# Patient Record
Sex: FEMALE | Race: WHITE | Employment: OTHER | RURAL
[De-identification: names, ages, dates, MRNs, and addresses within clinical notes are randomized per-mention and may not be internally consistent; named-entity substitution may affect disease eponyms.]

---

## 2017-01-05 ENCOUNTER — NURSE ONLY (OUTPATIENT)
Dept: PRIMARY CARE CLINIC | Age: 72
End: 2017-01-05
Payer: MEDICARE

## 2017-01-05 DIAGNOSIS — E53.8 VITAMIN B12 DEFICIENCY: Primary | ICD-10-CM

## 2017-01-05 PROCEDURE — 96372 THER/PROPH/DIAG INJ SC/IM: CPT | Performed by: NURSE PRACTITIONER

## 2017-01-05 RX ORDER — CYANOCOBALAMIN 1000 UG/ML
1000 INJECTION INTRAMUSCULAR; SUBCUTANEOUS ONCE
Status: COMPLETED | OUTPATIENT
Start: 2017-01-05 | End: 2017-01-05

## 2017-01-05 RX ADMIN — CYANOCOBALAMIN 1000 MCG: 1000 INJECTION INTRAMUSCULAR; SUBCUTANEOUS at 10:52

## 2017-01-12 ENCOUNTER — NURSE ONLY (OUTPATIENT)
Dept: PRIMARY CARE CLINIC | Age: 72
End: 2017-01-12
Payer: MEDICARE

## 2017-01-12 DIAGNOSIS — E53.8 VITAMIN B12 DEFICIENCY: Primary | ICD-10-CM

## 2017-01-12 PROCEDURE — 96372 THER/PROPH/DIAG INJ SC/IM: CPT | Performed by: NURSE PRACTITIONER

## 2017-01-12 RX ORDER — CYANOCOBALAMIN 1000 UG/ML
1000 INJECTION INTRAMUSCULAR; SUBCUTANEOUS ONCE
Status: COMPLETED | OUTPATIENT
Start: 2017-01-12 | End: 2017-01-12

## 2017-01-12 RX ADMIN — CYANOCOBALAMIN 1000 MCG: 1000 INJECTION INTRAMUSCULAR; SUBCUTANEOUS at 12:17

## 2017-01-18 ENCOUNTER — OFFICE VISIT (OUTPATIENT)
Dept: PRIMARY CARE CLINIC | Age: 72
End: 2017-01-18
Payer: MEDICARE

## 2017-01-18 VITALS
TEMPERATURE: 98.6 F | DIASTOLIC BLOOD PRESSURE: 74 MMHG | HEIGHT: 62 IN | OXYGEN SATURATION: 97 % | RESPIRATION RATE: 20 BRPM | BODY MASS INDEX: 30.22 KG/M2 | HEART RATE: 74 BPM | WEIGHT: 164.2 LBS | SYSTOLIC BLOOD PRESSURE: 132 MMHG

## 2017-01-18 DIAGNOSIS — J01.00 ACUTE NON-RECURRENT MAXILLARY SINUSITIS: Primary | ICD-10-CM

## 2017-01-18 DIAGNOSIS — E53.8 VITAMIN B12 DEFICIENCY: ICD-10-CM

## 2017-01-18 PROCEDURE — 99213 OFFICE O/P EST LOW 20 MIN: CPT | Performed by: NURSE PRACTITIONER

## 2017-01-18 PROCEDURE — 1090F PRES/ABSN URINE INCON ASSESS: CPT | Performed by: NURSE PRACTITIONER

## 2017-01-18 PROCEDURE — G8484 FLU IMMUNIZE NO ADMIN: HCPCS | Performed by: NURSE PRACTITIONER

## 2017-01-18 PROCEDURE — 96372 THER/PROPH/DIAG INJ SC/IM: CPT | Performed by: NURSE PRACTITIONER

## 2017-01-18 PROCEDURE — G8399 PT W/DXA RESULTS DOCUMENT: HCPCS | Performed by: NURSE PRACTITIONER

## 2017-01-18 PROCEDURE — 4040F PNEUMOC VAC/ADMIN/RCVD: CPT | Performed by: NURSE PRACTITIONER

## 2017-01-18 PROCEDURE — G8427 DOCREV CUR MEDS BY ELIG CLIN: HCPCS | Performed by: NURSE PRACTITIONER

## 2017-01-18 PROCEDURE — G8419 CALC BMI OUT NRM PARAM NOF/U: HCPCS | Performed by: NURSE PRACTITIONER

## 2017-01-18 PROCEDURE — 1036F TOBACCO NON-USER: CPT | Performed by: NURSE PRACTITIONER

## 2017-01-18 PROCEDURE — 3014F SCREEN MAMMO DOC REV: CPT | Performed by: NURSE PRACTITIONER

## 2017-01-18 PROCEDURE — 1123F ACP DISCUSS/DSCN MKR DOCD: CPT | Performed by: NURSE PRACTITIONER

## 2017-01-18 PROCEDURE — 3017F COLORECTAL CA SCREEN DOC REV: CPT | Performed by: NURSE PRACTITIONER

## 2017-01-18 RX ORDER — SULFAMETHOXAZOLE AND TRIMETHOPRIM 800; 160 MG/1; MG/1
1 TABLET ORAL 2 TIMES DAILY
Qty: 20 TABLET | Refills: 0 | Status: SHIPPED | OUTPATIENT
Start: 2017-01-18 | End: 2017-01-28

## 2017-01-18 RX ORDER — CYANOCOBALAMIN 1000 UG/ML
1000 INJECTION INTRAMUSCULAR; SUBCUTANEOUS ONCE
Status: COMPLETED | OUTPATIENT
Start: 2017-01-18 | End: 2017-01-18

## 2017-01-18 RX ORDER — BENZONATATE 200 MG/1
200 CAPSULE ORAL 3 TIMES DAILY PRN
Qty: 30 CAPSULE | Refills: 0 | Status: SHIPPED | OUTPATIENT
Start: 2017-01-18 | End: 2017-01-25

## 2017-01-18 RX ADMIN — CYANOCOBALAMIN 1000 MCG: 1000 INJECTION INTRAMUSCULAR; SUBCUTANEOUS at 16:48

## 2017-01-18 ASSESSMENT — ENCOUNTER SYMPTOMS
GASTROINTESTINAL NEGATIVE: 1
COUGH: 1

## 2017-01-20 ENCOUNTER — NURSE ONLY (OUTPATIENT)
Dept: PRIMARY CARE CLINIC | Age: 72
End: 2017-01-20
Payer: MEDICARE

## 2017-01-20 DIAGNOSIS — Z12.11 SCREENING FOR COLON CANCER: Primary | ICD-10-CM

## 2017-01-20 LAB
CONTROL: POSITIVE
HEMOCCULT STL QL: POSITIVE

## 2017-01-20 PROCEDURE — 82274 ASSAY TEST FOR BLOOD FECAL: CPT | Performed by: NURSE PRACTITIONER

## 2017-01-25 ENCOUNTER — NURSE ONLY (OUTPATIENT)
Dept: PRIMARY CARE CLINIC | Age: 72
End: 2017-01-25
Payer: MEDICARE

## 2017-01-25 DIAGNOSIS — E53.8 B12 DEFICIENCY: Primary | ICD-10-CM

## 2017-01-25 PROCEDURE — 96372 THER/PROPH/DIAG INJ SC/IM: CPT | Performed by: NURSE PRACTITIONER

## 2017-01-25 RX ORDER — CYANOCOBALAMIN 1000 UG/ML
1000 INJECTION INTRAMUSCULAR; SUBCUTANEOUS ONCE
Status: COMPLETED | OUTPATIENT
Start: 2017-01-25 | End: 2017-01-25

## 2017-01-25 RX ADMIN — CYANOCOBALAMIN 1000 MCG: 1000 INJECTION INTRAMUSCULAR; SUBCUTANEOUS at 12:21

## 2017-02-01 DIAGNOSIS — R19.5 POSITIVE FIT (FECAL IMMUNOCHEMICAL TEST): Primary | ICD-10-CM

## 2017-02-02 ENCOUNTER — NURSE ONLY (OUTPATIENT)
Dept: PRIMARY CARE CLINIC | Age: 72
End: 2017-02-02
Payer: MEDICARE

## 2017-02-02 DIAGNOSIS — E53.8 VITAMIN B12 DEFICIENCY: Primary | ICD-10-CM

## 2017-02-02 PROCEDURE — 96372 THER/PROPH/DIAG INJ SC/IM: CPT | Performed by: NURSE PRACTITIONER

## 2017-02-02 RX ORDER — CYANOCOBALAMIN 1000 UG/ML
1000 INJECTION INTRAMUSCULAR; SUBCUTANEOUS ONCE
Status: COMPLETED | OUTPATIENT
Start: 2017-02-02 | End: 2017-02-02

## 2017-02-02 RX ADMIN — CYANOCOBALAMIN 1000 MCG: 1000 INJECTION INTRAMUSCULAR; SUBCUTANEOUS at 14:14

## 2017-02-07 ENCOUNTER — TELEPHONE (OUTPATIENT)
Dept: OTHER | Age: 72
End: 2017-02-07

## 2017-02-09 ENCOUNTER — OFFICE VISIT (OUTPATIENT)
Dept: PRIMARY CARE CLINIC | Age: 72
End: 2017-02-09
Payer: MEDICARE

## 2017-02-09 DIAGNOSIS — E53.8 B12 DEFICIENCY: Primary | ICD-10-CM

## 2017-02-09 PROCEDURE — 1036F TOBACCO NON-USER: CPT | Performed by: NURSE PRACTITIONER

## 2017-02-09 PROCEDURE — 96372 THER/PROPH/DIAG INJ SC/IM: CPT | Performed by: NURSE PRACTITIONER

## 2017-02-09 RX ORDER — CYANOCOBALAMIN 1000 UG/ML
1000 INJECTION INTRAMUSCULAR; SUBCUTANEOUS ONCE
Status: COMPLETED | OUTPATIENT
Start: 2017-02-09 | End: 2017-02-09

## 2017-02-09 RX ADMIN — CYANOCOBALAMIN 1000 MCG: 1000 INJECTION INTRAMUSCULAR; SUBCUTANEOUS at 11:05

## 2017-02-16 ENCOUNTER — NURSE ONLY (OUTPATIENT)
Dept: PRIMARY CARE CLINIC | Age: 72
End: 2017-02-16
Payer: MEDICARE

## 2017-02-16 DIAGNOSIS — E53.8 B12 DEFICIENCY: Primary | ICD-10-CM

## 2017-02-16 PROCEDURE — 96372 THER/PROPH/DIAG INJ SC/IM: CPT | Performed by: NURSE PRACTITIONER

## 2017-02-16 RX ORDER — CYANOCOBALAMIN 1000 UG/ML
1000 INJECTION INTRAMUSCULAR; SUBCUTANEOUS ONCE
Status: COMPLETED | OUTPATIENT
Start: 2017-02-16 | End: 2017-02-16

## 2017-02-16 RX ADMIN — CYANOCOBALAMIN 1000 MCG: 1000 INJECTION INTRAMUSCULAR; SUBCUTANEOUS at 14:24

## 2017-02-17 ENCOUNTER — HOSPITAL ENCOUNTER (OUTPATIENT)
Dept: OTHER | Age: 72
Discharge: HOME | End: 2017-02-17
Attending: SURGERY | Admitting: SURGERY

## 2017-02-17 VITALS
SYSTOLIC BLOOD PRESSURE: 152 MMHG | HEART RATE: 68 BPM | RESPIRATION RATE: 18 BRPM | TEMPERATURE: 99.4 F | OXYGEN SATURATION: 95 % | BODY MASS INDEX: 30.31 KG/M2 | DIASTOLIC BLOOD PRESSURE: 84 MMHG | WEIGHT: 164.7 LBS | HEIGHT: 62 IN

## 2017-02-17 RX ORDER — SODIUM CHLORIDE 0.9 % (FLUSH) 0.9 %
10 SYRINGE (ML) INJECTION PRN
Status: CANCELLED | OUTPATIENT
Start: 2017-02-17

## 2017-02-17 RX ORDER — SODIUM CHLORIDE, SODIUM LACTATE, POTASSIUM CHLORIDE, CALCIUM CHLORIDE 600; 310; 30; 20 MG/100ML; MG/100ML; MG/100ML; MG/100ML
INJECTION, SOLUTION INTRAVENOUS CONTINUOUS
Status: CANCELLED | OUTPATIENT
Start: 2017-02-17

## 2017-02-17 RX ORDER — SODIUM CHLORIDE 0.9 % (FLUSH) 0.9 %
10 SYRINGE (ML) INJECTION EVERY 12 HOURS SCHEDULED
Status: CANCELLED | OUTPATIENT
Start: 2017-02-17

## 2017-02-17 ASSESSMENT — ENCOUNTER SYMPTOMS
EYES NEGATIVE: 1
RESPIRATORY NEGATIVE: 1
GASTROINTESTINAL NEGATIVE: 1

## 2017-02-23 ENCOUNTER — NURSE ONLY (OUTPATIENT)
Dept: PRIMARY CARE CLINIC | Age: 72
End: 2017-02-23
Payer: MEDICARE

## 2017-02-23 DIAGNOSIS — E53.8 VITAMIN B12 DEFICIENCY: Primary | ICD-10-CM

## 2017-02-23 PROCEDURE — 96372 THER/PROPH/DIAG INJ SC/IM: CPT | Performed by: NURSE PRACTITIONER

## 2017-02-23 RX ORDER — CYANOCOBALAMIN 1000 UG/ML
1000 INJECTION INTRAMUSCULAR; SUBCUTANEOUS ONCE
Status: COMPLETED | OUTPATIENT
Start: 2017-02-23 | End: 2017-02-23

## 2017-02-23 RX ADMIN — CYANOCOBALAMIN 1000 MCG: 1000 INJECTION INTRAMUSCULAR; SUBCUTANEOUS at 11:51

## 2017-03-03 ENCOUNTER — HOSPITAL ENCOUNTER (OUTPATIENT)
Dept: OTHER | Age: 72
Discharge: HOME | End: 2017-03-03
Attending: SURGERY | Admitting: SURGERY

## 2017-03-03 ENCOUNTER — NURSE ONLY (OUTPATIENT)
Dept: PRIMARY CARE CLINIC | Age: 72
End: 2017-03-03
Payer: MEDICARE

## 2017-03-03 VITALS
TEMPERATURE: 99.1 F | BODY MASS INDEX: 30.36 KG/M2 | OXYGEN SATURATION: 98 % | HEART RATE: 63 BPM | WEIGHT: 165 LBS | HEIGHT: 62 IN

## 2017-03-03 DIAGNOSIS — E53.8 VITAMIN B12 DEFICIENCY: Primary | ICD-10-CM

## 2017-03-03 PROCEDURE — 96372 THER/PROPH/DIAG INJ SC/IM: CPT | Performed by: NURSE PRACTITIONER

## 2017-03-03 RX ORDER — CYANOCOBALAMIN 1000 UG/ML
1000 INJECTION INTRAMUSCULAR; SUBCUTANEOUS ONCE
Status: COMPLETED | OUTPATIENT
Start: 2017-03-03 | End: 2017-03-03

## 2017-03-03 RX ADMIN — CYANOCOBALAMIN 1000 MCG: 1000 INJECTION INTRAMUSCULAR; SUBCUTANEOUS at 15:24

## 2017-03-09 ENCOUNTER — NURSE ONLY (OUTPATIENT)
Dept: PRIMARY CARE CLINIC | Age: 72
End: 2017-03-09
Payer: MEDICARE

## 2017-03-09 DIAGNOSIS — E53.8 VITAMIN B12 DEFICIENCY: Primary | ICD-10-CM

## 2017-03-09 PROCEDURE — 96372 THER/PROPH/DIAG INJ SC/IM: CPT | Performed by: NURSE PRACTITIONER

## 2017-03-09 RX ORDER — CYANOCOBALAMIN 1000 UG/ML
1000 INJECTION INTRAMUSCULAR; SUBCUTANEOUS ONCE
Status: COMPLETED | OUTPATIENT
Start: 2017-03-09 | End: 2017-03-09

## 2017-03-09 RX ADMIN — CYANOCOBALAMIN 1000 MCG: 1000 INJECTION INTRAMUSCULAR; SUBCUTANEOUS at 14:38

## 2017-03-16 ENCOUNTER — NURSE ONLY (OUTPATIENT)
Dept: PRIMARY CARE CLINIC | Age: 72
End: 2017-03-16
Payer: MEDICARE

## 2017-03-16 DIAGNOSIS — E53.8 VITAMIN B12 DEFICIENCY: Primary | ICD-10-CM

## 2017-03-16 PROCEDURE — 96372 THER/PROPH/DIAG INJ SC/IM: CPT | Performed by: NURSE PRACTITIONER

## 2017-03-16 RX ORDER — CYANOCOBALAMIN 1000 UG/ML
1000 INJECTION INTRAMUSCULAR; SUBCUTANEOUS ONCE
Status: COMPLETED | OUTPATIENT
Start: 2017-03-16 | End: 2017-03-16

## 2017-03-16 RX ADMIN — CYANOCOBALAMIN 1000 MCG: 1000 INJECTION INTRAMUSCULAR; SUBCUTANEOUS at 13:49

## 2017-03-23 ENCOUNTER — NURSE ONLY (OUTPATIENT)
Dept: PRIMARY CARE CLINIC | Age: 72
End: 2017-03-23
Payer: MEDICARE

## 2017-03-23 DIAGNOSIS — E53.8 VITAMIN B12 DEFICIENCY: Primary | ICD-10-CM

## 2017-03-23 PROCEDURE — 96372 THER/PROPH/DIAG INJ SC/IM: CPT | Performed by: NURSE PRACTITIONER

## 2017-03-23 RX ORDER — CYANOCOBALAMIN 1000 UG/ML
1000 INJECTION INTRAMUSCULAR; SUBCUTANEOUS ONCE
Status: COMPLETED | OUTPATIENT
Start: 2017-03-23 | End: 2017-03-23

## 2017-03-23 RX ADMIN — CYANOCOBALAMIN 1000 MCG: 1000 INJECTION INTRAMUSCULAR; SUBCUTANEOUS at 11:59

## 2017-03-30 ENCOUNTER — TELEPHONE (OUTPATIENT)
Dept: PRIMARY CARE CLINIC | Age: 72
End: 2017-03-30

## 2017-03-30 ENCOUNTER — NURSE ONLY (OUTPATIENT)
Dept: PRIMARY CARE CLINIC | Age: 72
End: 2017-03-30
Payer: MEDICARE

## 2017-03-30 DIAGNOSIS — E53.8 B12 DEFICIENCY: Primary | ICD-10-CM

## 2017-03-30 PROCEDURE — 96372 THER/PROPH/DIAG INJ SC/IM: CPT | Performed by: NURSE PRACTITIONER

## 2017-03-30 RX ORDER — CYANOCOBALAMIN 1000 UG/ML
1000 INJECTION INTRAMUSCULAR; SUBCUTANEOUS ONCE
Status: COMPLETED | OUTPATIENT
Start: 2017-03-30 | End: 2017-03-30

## 2017-03-30 RX ADMIN — CYANOCOBALAMIN 1000 MCG: 1000 INJECTION INTRAMUSCULAR; SUBCUTANEOUS at 15:21

## 2017-04-17 ENCOUNTER — HOSPITAL ENCOUNTER (OUTPATIENT)
Dept: OTHER | Age: 72
Discharge: HOME | End: 2017-04-17
Attending: NURSE PRACTITIONER | Admitting: NURSE PRACTITIONER

## 2017-04-17 DIAGNOSIS — E78.00 PURE HYPERCHOLESTEROLEMIA: ICD-10-CM

## 2017-04-17 DIAGNOSIS — I10 ESSENTIAL HYPERTENSION: ICD-10-CM

## 2017-04-17 DIAGNOSIS — E11.9 TYPE 2 DIABETES MELLITUS WITHOUT COMPLICATION, WITHOUT LONG-TERM CURRENT USE OF INSULIN (HCC): ICD-10-CM

## 2017-04-17 LAB
A/G RATIO: 1.9 (ref 0.8–2)
ALBUMIN SERPL-MCNC: 4.4 G/DL (ref 3.4–4.8)
ALP BLD-CCNC: 121 U/L (ref 25–100)
ALT SERPL-CCNC: 17 U/L (ref 4–36)
ANION GAP SERPL CALCULATED.3IONS-SCNC: 13 MMOL/L (ref 3–16)
AST SERPL-CCNC: 16 U/L (ref 8–33)
BILIRUB SERPL-MCNC: 0.3 MG/DL (ref 0.3–1.2)
BUN BLDV-MCNC: 20 MG/DL (ref 6–20)
CALCIUM SERPL-MCNC: 9.3 MG/DL (ref 8.5–10.5)
CHLORIDE BLD-SCNC: 101 MMOL/L (ref 98–107)
CHOLESTEROL, TOTAL: 166 MG/DL (ref 0–200)
CO2: 30 MMOL/L (ref 20–30)
CREAT SERPL-MCNC: 0.9 MG/DL (ref 0.4–1.2)
CREATININE URINE: 35.1 MG/DL (ref 1.5–300)
GFR AFRICAN AMERICAN: >59
GFR NON-AFRICAN AMERICAN: >60
GLOBULIN: 2.3 G/DL
GLUCOSE BLD-MCNC: 81 MG/DL (ref 74–106)
HBA1C MFR BLD: 6.6 %
HCT VFR BLD CALC: 40 % (ref 37–47)
HDLC SERPL-MCNC: 53 MG/DL (ref 40–60)
HEMOGLOBIN: 13 G/DL (ref 11.5–16.5)
LDL CHOLESTEROL CALCULATED: 67 MG/DL
MCH RBC QN AUTO: 29.5 PG (ref 27–32)
MCHC RBC AUTO-ENTMCNC: 32.5 G/DL (ref 31–35)
MCV RBC AUTO: 90.9 FL (ref 80–100)
MICROALBUMIN UR-MCNC: <1.2 MG/DL (ref 0–22)
MICROALBUMIN/CREAT UR-RTO: NORMAL MG/G (ref 0–30)
PDW BLD-RTO: 13.6 % (ref 11–16)
PLATELET # BLD: 226 K/UL (ref 150–400)
PMV BLD AUTO: 11.5 FL (ref 6–10)
POTASSIUM SERPL-SCNC: 4.3 MMOL/L (ref 3.4–5.1)
RBC # BLD: 4.4 M/UL (ref 3.8–5.8)
SODIUM BLD-SCNC: 144 MMOL/L (ref 136–145)
TOTAL PROTEIN: 6.7 G/DL (ref 6.4–8.3)
TRIGL SERPL-MCNC: 230 MG/DL (ref 0–249)
VLDLC SERPL CALC-MCNC: 46 MG/DL
WBC # BLD: 7.9 K/UL (ref 4–11)

## 2017-04-20 ENCOUNTER — OFFICE VISIT (OUTPATIENT)
Dept: PRIMARY CARE CLINIC | Age: 72
End: 2017-04-20
Payer: MEDICARE

## 2017-04-20 VITALS
OXYGEN SATURATION: 96 % | BODY MASS INDEX: 30.36 KG/M2 | WEIGHT: 165 LBS | HEIGHT: 62 IN | SYSTOLIC BLOOD PRESSURE: 124 MMHG | HEART RATE: 64 BPM | DIASTOLIC BLOOD PRESSURE: 80 MMHG | RESPIRATION RATE: 20 BRPM

## 2017-04-20 DIAGNOSIS — E03.9 ACQUIRED HYPOTHYROIDISM: ICD-10-CM

## 2017-04-20 DIAGNOSIS — I10 ESSENTIAL HYPERTENSION: Primary | ICD-10-CM

## 2017-04-20 DIAGNOSIS — E78.00 PURE HYPERCHOLESTEROLEMIA: ICD-10-CM

## 2017-04-20 DIAGNOSIS — L98.9 LESION OF SKIN OF SCALP: ICD-10-CM

## 2017-04-20 DIAGNOSIS — E53.8 B12 DEFICIENCY: ICD-10-CM

## 2017-04-20 DIAGNOSIS — F32.A DEPRESSION, UNSPECIFIED DEPRESSION TYPE: ICD-10-CM

## 2017-04-20 DIAGNOSIS — M81.0 OSTEOPOROSIS: ICD-10-CM

## 2017-04-20 DIAGNOSIS — E11.9 TYPE 2 DIABETES MELLITUS WITHOUT COMPLICATION, WITHOUT LONG-TERM CURRENT USE OF INSULIN (HCC): ICD-10-CM

## 2017-04-20 DIAGNOSIS — E55.9 VITAMIN D DEFICIENCY: ICD-10-CM

## 2017-04-20 PROCEDURE — 1123F ACP DISCUSS/DSCN MKR DOCD: CPT | Performed by: NURSE PRACTITIONER

## 2017-04-20 PROCEDURE — G8399 PT W/DXA RESULTS DOCUMENT: HCPCS | Performed by: NURSE PRACTITIONER

## 2017-04-20 PROCEDURE — 3044F HG A1C LEVEL LT 7.0%: CPT | Performed by: NURSE PRACTITIONER

## 2017-04-20 PROCEDURE — 3017F COLORECTAL CA SCREEN DOC REV: CPT | Performed by: NURSE PRACTITIONER

## 2017-04-20 PROCEDURE — 1036F TOBACCO NON-USER: CPT | Performed by: NURSE PRACTITIONER

## 2017-04-20 PROCEDURE — G8427 DOCREV CUR MEDS BY ELIG CLIN: HCPCS | Performed by: NURSE PRACTITIONER

## 2017-04-20 PROCEDURE — 4040F PNEUMOC VAC/ADMIN/RCVD: CPT | Performed by: NURSE PRACTITIONER

## 2017-04-20 PROCEDURE — 99214 OFFICE O/P EST MOD 30 MIN: CPT | Performed by: NURSE PRACTITIONER

## 2017-04-20 PROCEDURE — 3014F SCREEN MAMMO DOC REV: CPT | Performed by: NURSE PRACTITIONER

## 2017-04-20 PROCEDURE — 1090F PRES/ABSN URINE INCON ASSESS: CPT | Performed by: NURSE PRACTITIONER

## 2017-04-20 PROCEDURE — 4005F PHARM THX FOR OP RXD: CPT | Performed by: NURSE PRACTITIONER

## 2017-04-20 PROCEDURE — G8417 CALC BMI ABV UP PARAM F/U: HCPCS | Performed by: NURSE PRACTITIONER

## 2017-04-20 RX ORDER — ATENOLOL 50 MG/1
TABLET ORAL
Qty: 90 TABLET | Refills: 3 | Status: SHIPPED | OUTPATIENT
Start: 2017-04-20 | End: 2017-04-20 | Stop reason: SDUPTHER

## 2017-04-20 RX ORDER — MONTELUKAST SODIUM 10 MG/1
TABLET ORAL
Qty: 90 TABLET | Refills: 3 | Status: SHIPPED | OUTPATIENT
Start: 2017-04-20 | End: 2017-10-19 | Stop reason: SDUPTHER

## 2017-04-20 RX ORDER — LISINOPRIL 5 MG/1
TABLET ORAL
Qty: 90 TABLET | Refills: 3 | Status: SHIPPED | OUTPATIENT
Start: 2017-04-20 | End: 2017-10-19 | Stop reason: SDUPTHER

## 2017-04-20 RX ORDER — FLUTICASONE PROPIONATE 50 MCG
SPRAY, SUSPENSION (ML) NASAL
Qty: 16 G | Refills: 3 | Status: SHIPPED | OUTPATIENT
Start: 2017-04-20 | End: 2017-10-19 | Stop reason: SDUPTHER

## 2017-04-20 RX ORDER — LEVOTHYROXINE SODIUM 0.03 MG/1
TABLET ORAL
Qty: 90 TABLET | Refills: 3 | Status: SHIPPED | OUTPATIENT
Start: 2017-04-20 | End: 2017-10-19 | Stop reason: SDUPTHER

## 2017-04-20 RX ORDER — ESOMEPRAZOLE MAGNESIUM 40 MG/1
CAPSULE, DELAYED RELEASE ORAL
Qty: 90 CAPSULE | Refills: 3 | Status: SHIPPED | OUTPATIENT
Start: 2017-04-20 | End: 2017-10-19 | Stop reason: SDUPTHER

## 2017-04-20 RX ORDER — ERGOCALCIFEROL (VITAMIN D2) 1250 MCG
50000 CAPSULE ORAL WEEKLY
Qty: 4 CAPSULE | Refills: 3 | Status: SHIPPED | OUTPATIENT
Start: 2017-04-20 | End: 2017-04-20 | Stop reason: SDUPTHER

## 2017-04-20 RX ORDER — ATENOLOL 50 MG/1
TABLET ORAL
Qty: 90 TABLET | Refills: 3 | Status: SHIPPED | OUTPATIENT
Start: 2017-04-20 | End: 2017-10-19 | Stop reason: SDUPTHER

## 2017-04-20 RX ORDER — ALENDRONATE SODIUM 70 MG/1
TABLET ORAL
Qty: 4 TABLET | Refills: 3 | Status: SHIPPED | OUTPATIENT
Start: 2017-04-20 | End: 2017-10-19 | Stop reason: SDUPTHER

## 2017-04-20 RX ORDER — ASPIRIN 81 MG/1
81 TABLET ORAL DAILY
Qty: 90 TABLET | Refills: 3 | Status: SHIPPED | OUTPATIENT
Start: 2017-04-20 | End: 2017-10-19 | Stop reason: SDUPTHER

## 2017-04-20 RX ORDER — ERGOCALCIFEROL (VITAMIN D2) 1250 MCG
50000 CAPSULE ORAL WEEKLY
Qty: 12 CAPSULE | Refills: 3 | Status: SHIPPED | OUTPATIENT
Start: 2017-04-20 | End: 2018-03-05 | Stop reason: SDUPTHER

## 2017-04-20 RX ORDER — LEVOTHYROXINE SODIUM 0.03 MG/1
TABLET ORAL
Qty: 90 TABLET | Refills: 3 | Status: SHIPPED | OUTPATIENT
Start: 2017-04-20 | End: 2017-04-20 | Stop reason: SDUPTHER

## 2017-04-20 RX ORDER — ESOMEPRAZOLE MAGNESIUM 40 MG/1
CAPSULE, DELAYED RELEASE ORAL
Qty: 90 CAPSULE | Refills: 3 | Status: SHIPPED | OUTPATIENT
Start: 2017-04-20 | End: 2017-04-20 | Stop reason: SDUPTHER

## 2017-04-20 RX ORDER — MONTELUKAST SODIUM 10 MG/1
TABLET ORAL
Qty: 90 TABLET | Refills: 3 | Status: SHIPPED | OUTPATIENT
Start: 2017-04-20 | End: 2017-04-20 | Stop reason: SDUPTHER

## 2017-04-20 RX ORDER — ALENDRONATE SODIUM 70 MG/1
TABLET ORAL
Qty: 4 TABLET | Refills: 3 | Status: SHIPPED | OUTPATIENT
Start: 2017-04-20 | End: 2017-04-20 | Stop reason: SDUPTHER

## 2017-04-20 RX ORDER — ATORVASTATIN CALCIUM 20 MG/1
20 TABLET, FILM COATED ORAL DAILY
Qty: 90 TABLET | Refills: 3 | Status: SHIPPED | OUTPATIENT
Start: 2017-04-20 | End: 2017-04-20 | Stop reason: SDUPTHER

## 2017-04-20 RX ORDER — ATORVASTATIN CALCIUM 20 MG/1
20 TABLET, FILM COATED ORAL DAILY
Qty: 90 TABLET | Refills: 3 | Status: SHIPPED | OUTPATIENT
Start: 2017-04-20 | End: 2017-10-19 | Stop reason: SDUPTHER

## 2017-04-20 RX ORDER — LISINOPRIL 5 MG/1
TABLET ORAL
Qty: 90 TABLET | Refills: 3 | Status: SHIPPED | OUTPATIENT
Start: 2017-04-20 | End: 2017-04-20 | Stop reason: SDUPTHER

## 2017-04-20 RX ORDER — ASPIRIN 81 MG/1
81 TABLET ORAL DAILY
Qty: 90 TABLET | Refills: 3 | Status: SHIPPED | OUTPATIENT
Start: 2017-04-20 | End: 2017-04-20 | Stop reason: SDUPTHER

## 2017-04-20 RX ORDER — ESCITALOPRAM OXALATE 10 MG/1
TABLET ORAL
Qty: 90 TABLET | Refills: 3 | Status: SHIPPED | OUTPATIENT
Start: 2017-04-20 | End: 2017-04-20 | Stop reason: SDUPTHER

## 2017-04-20 RX ORDER — ESCITALOPRAM OXALATE 10 MG/1
TABLET ORAL
Qty: 90 TABLET | Refills: 3 | Status: SHIPPED | OUTPATIENT
Start: 2017-04-20 | End: 2017-10-19 | Stop reason: SDUPTHER

## 2017-04-20 ASSESSMENT — ENCOUNTER SYMPTOMS
RESPIRATORY NEGATIVE: 1
GASTROINTESTINAL NEGATIVE: 1

## 2017-05-26 ENCOUNTER — NURSE ONLY (OUTPATIENT)
Dept: PRIMARY CARE CLINIC | Age: 72
End: 2017-05-26
Payer: MEDICARE

## 2017-05-26 DIAGNOSIS — J30.9 ALLERGIC RHINITIS, UNSPECIFIED ALLERGIC RHINITIS TRIGGER, UNSPECIFIED RHINITIS SEASONALITY: Primary | ICD-10-CM

## 2017-05-26 PROCEDURE — 96372 THER/PROPH/DIAG INJ SC/IM: CPT | Performed by: FAMILY MEDICINE

## 2017-05-26 RX ORDER — CYANOCOBALAMIN 1000 UG/ML
1000 INJECTION INTRAMUSCULAR; SUBCUTANEOUS ONCE
Status: COMPLETED | OUTPATIENT
Start: 2017-05-26 | End: 2017-05-26

## 2017-05-26 RX ADMIN — CYANOCOBALAMIN 1000 MCG: 1000 INJECTION INTRAMUSCULAR; SUBCUTANEOUS at 13:36

## 2017-06-30 ENCOUNTER — NURSE ONLY (OUTPATIENT)
Dept: PRIMARY CARE CLINIC | Age: 72
End: 2017-06-30
Payer: MEDICARE

## 2017-06-30 DIAGNOSIS — E53.8 VITAMIN B12 DEFICIENCY: Primary | ICD-10-CM

## 2017-06-30 PROCEDURE — 96372 THER/PROPH/DIAG INJ SC/IM: CPT | Performed by: NURSE PRACTITIONER

## 2017-06-30 RX ORDER — CYANOCOBALAMIN 1000 UG/ML
1000 INJECTION INTRAMUSCULAR; SUBCUTANEOUS ONCE
Status: COMPLETED | OUTPATIENT
Start: 2017-06-30 | End: 2017-06-30

## 2017-06-30 RX ADMIN — CYANOCOBALAMIN 1000 MCG: 1000 INJECTION INTRAMUSCULAR; SUBCUTANEOUS at 13:41

## 2017-07-27 ENCOUNTER — NURSE ONLY (OUTPATIENT)
Dept: PRIMARY CARE CLINIC | Age: 72
End: 2017-07-27
Payer: MEDICARE

## 2017-07-27 DIAGNOSIS — E53.8 VITAMIN B12 DEFICIENCY: Primary | ICD-10-CM

## 2017-07-27 PROCEDURE — 96372 THER/PROPH/DIAG INJ SC/IM: CPT | Performed by: PEDIATRICS

## 2017-07-27 RX ORDER — CYANOCOBALAMIN 1000 UG/ML
1000 INJECTION INTRAMUSCULAR; SUBCUTANEOUS ONCE
Status: COMPLETED | OUTPATIENT
Start: 2017-07-27 | End: 2017-07-27

## 2017-07-27 RX ADMIN — CYANOCOBALAMIN 1000 MCG: 1000 INJECTION INTRAMUSCULAR; SUBCUTANEOUS at 15:07

## 2017-08-16 ENCOUNTER — HOSPITAL ENCOUNTER (OUTPATIENT)
Dept: GENERAL RADIOLOGY | Age: 72
Discharge: HOME | End: 2017-08-16
Attending: NURSE PRACTITIONER | Admitting: NURSE PRACTITIONER

## 2017-08-16 DIAGNOSIS — Z12.31 ENCOUNTER FOR SCREENING MAMMOGRAM FOR BREAST CANCER: ICD-10-CM

## 2017-08-17 ENCOUNTER — TELEPHONE (OUTPATIENT)
Dept: PRIMARY CARE CLINIC | Age: 72
End: 2017-08-17

## 2017-08-31 ENCOUNTER — NURSE ONLY (OUTPATIENT)
Dept: PRIMARY CARE CLINIC | Age: 72
End: 2017-08-31
Payer: MEDICARE

## 2017-08-31 DIAGNOSIS — E53.8 VITAMIN B12 DEFICIENCY: Primary | ICD-10-CM

## 2017-08-31 PROCEDURE — 96372 THER/PROPH/DIAG INJ SC/IM: CPT | Performed by: NURSE PRACTITIONER

## 2017-08-31 RX ORDER — CYANOCOBALAMIN 1000 UG/ML
1000 INJECTION INTRAMUSCULAR; SUBCUTANEOUS ONCE
Status: COMPLETED | OUTPATIENT
Start: 2017-08-31 | End: 2017-08-31

## 2017-08-31 RX ADMIN — CYANOCOBALAMIN 1000 MCG: 1000 INJECTION INTRAMUSCULAR; SUBCUTANEOUS at 11:30

## 2017-09-29 ENCOUNTER — NURSE ONLY (OUTPATIENT)
Dept: PRIMARY CARE CLINIC | Age: 72
End: 2017-09-29
Payer: MEDICARE

## 2017-09-29 DIAGNOSIS — E53.8 B12 DEFICIENCY: Primary | ICD-10-CM

## 2017-09-29 PROCEDURE — 96372 THER/PROPH/DIAG INJ SC/IM: CPT | Performed by: NURSE PRACTITIONER

## 2017-09-29 RX ORDER — CYANOCOBALAMIN 1000 UG/ML
1000 INJECTION INTRAMUSCULAR; SUBCUTANEOUS ONCE
Status: COMPLETED | OUTPATIENT
Start: 2017-09-29 | End: 2017-09-29

## 2017-09-29 RX ADMIN — CYANOCOBALAMIN 1000 MCG: 1000 INJECTION INTRAMUSCULAR; SUBCUTANEOUS at 11:15

## 2017-10-16 ENCOUNTER — HOSPITAL ENCOUNTER (OUTPATIENT)
Dept: OTHER | Age: 72
Discharge: HOME | End: 2017-10-16
Attending: NURSE PRACTITIONER | Admitting: NURSE PRACTITIONER

## 2017-10-16 DIAGNOSIS — E55.9 VITAMIN D DEFICIENCY: ICD-10-CM

## 2017-10-16 DIAGNOSIS — E53.8 B12 DEFICIENCY: ICD-10-CM

## 2017-10-16 DIAGNOSIS — I10 ESSENTIAL HYPERTENSION: ICD-10-CM

## 2017-10-16 DIAGNOSIS — E78.00 PURE HYPERCHOLESTEROLEMIA: ICD-10-CM

## 2017-10-16 DIAGNOSIS — E03.9 ACQUIRED HYPOTHYROIDISM: ICD-10-CM

## 2017-10-16 LAB
A/G RATIO: 2.1 (ref 0.8–2)
ALBUMIN SERPL-MCNC: 4.6 G/DL (ref 3.4–4.8)
ALP BLD-CCNC: 117 U/L (ref 25–100)
ALT SERPL-CCNC: 18 U/L (ref 4–36)
ANION GAP SERPL CALCULATED.3IONS-SCNC: 11 MMOL/L (ref 3–16)
AST SERPL-CCNC: 15 U/L (ref 8–33)
BILIRUB SERPL-MCNC: 0.4 MG/DL (ref 0.3–1.2)
BUN BLDV-MCNC: 21 MG/DL (ref 6–20)
CALCIUM SERPL-MCNC: 9.7 MG/DL (ref 8.5–10.5)
CHLORIDE BLD-SCNC: 101 MMOL/L (ref 98–107)
CHOLESTEROL, TOTAL: 167 MG/DL (ref 0–200)
CO2: 29 MMOL/L (ref 20–30)
CREAT SERPL-MCNC: 0.8 MG/DL (ref 0.4–1.2)
FOLATE: 12.38 NG/ML
GFR AFRICAN AMERICAN: >59
GFR NON-AFRICAN AMERICAN: >60
GLOBULIN: 2.2 G/DL
GLUCOSE BLD-MCNC: 108 MG/DL (ref 74–106)
HCT VFR BLD CALC: 39.7 % (ref 37–47)
HDLC SERPL-MCNC: 43 MG/DL (ref 40–60)
HEMOGLOBIN: 12.7 G/DL (ref 11.5–16.5)
LDL CHOLESTEROL CALCULATED: 81 MG/DL
MCH RBC QN AUTO: 29 PG (ref 27–32)
MCHC RBC AUTO-ENTMCNC: 32 G/DL (ref 31–35)
MCV RBC AUTO: 90.6 FL (ref 80–100)
PDW BLD-RTO: 13 % (ref 11–16)
PLATELET # BLD: 221 K/UL (ref 150–400)
PMV BLD AUTO: 11.9 FL (ref 6–10)
POTASSIUM SERPL-SCNC: 4.6 MMOL/L (ref 3.4–5.1)
RBC # BLD: 4.38 M/UL (ref 3.8–5.8)
SODIUM BLD-SCNC: 141 MMOL/L (ref 136–145)
TOTAL PROTEIN: 6.8 G/DL (ref 6.4–8.3)
TRIGL SERPL-MCNC: 213 MG/DL (ref 0–249)
TSH SERPL DL<=0.05 MIU/L-ACNC: 4.01 UIU/ML (ref 0.35–5.5)
VITAMIN B-12: 378 PG/ML (ref 211–911)
VITAMIN D 25-HYDROXY: 21.1 (ref 32–100)
VLDLC SERPL CALC-MCNC: 43 MG/DL
WBC # BLD: 6.9 K/UL (ref 4–11)

## 2017-10-19 ENCOUNTER — OFFICE VISIT (OUTPATIENT)
Dept: PRIMARY CARE CLINIC | Age: 72
End: 2017-10-19
Payer: MEDICARE

## 2017-10-19 VITALS
RESPIRATION RATE: 20 BRPM | DIASTOLIC BLOOD PRESSURE: 70 MMHG | OXYGEN SATURATION: 98 % | HEIGHT: 62 IN | HEART RATE: 68 BPM | WEIGHT: 163.4 LBS | BODY MASS INDEX: 30.07 KG/M2 | SYSTOLIC BLOOD PRESSURE: 140 MMHG

## 2017-10-19 DIAGNOSIS — E78.00 PURE HYPERCHOLESTEROLEMIA: ICD-10-CM

## 2017-10-19 DIAGNOSIS — I10 ESSENTIAL HYPERTENSION: ICD-10-CM

## 2017-10-19 DIAGNOSIS — M54.6 ACUTE BILATERAL THORACIC BACK PAIN: Primary | ICD-10-CM

## 2017-10-19 DIAGNOSIS — E03.9 ACQUIRED HYPOTHYROIDISM: ICD-10-CM

## 2017-10-19 DIAGNOSIS — I15.9 SECONDARY HYPERTENSION: ICD-10-CM

## 2017-10-19 DIAGNOSIS — E55.9 VITAMIN D DEFICIENCY: ICD-10-CM

## 2017-10-19 DIAGNOSIS — E53.8 VITAMIN B12 DEFICIENCY: ICD-10-CM

## 2017-10-19 DIAGNOSIS — Z23 NEED FOR PROPHYLACTIC VACCINATION AGAINST STREPTOCOCCUS PNEUMONIAE (PNEUMOCOCCUS): ICD-10-CM

## 2017-10-19 DIAGNOSIS — E11.9 TYPE 2 DIABETES MELLITUS WITHOUT COMPLICATION, WITHOUT LONG-TERM CURRENT USE OF INSULIN (HCC): ICD-10-CM

## 2017-10-19 DIAGNOSIS — F32.A DEPRESSION, UNSPECIFIED DEPRESSION TYPE: ICD-10-CM

## 2017-10-19 DIAGNOSIS — M81.0 OSTEOPOROSIS, UNSPECIFIED OSTEOPOROSIS TYPE, UNSPECIFIED PATHOLOGICAL FRACTURE PRESENCE: ICD-10-CM

## 2017-10-19 LAB — HBA1C MFR BLD: 6.6 %

## 2017-10-19 PROCEDURE — G0008 ADMIN INFLUENZA VIRUS VAC: HCPCS | Performed by: NURSE PRACTITIONER

## 2017-10-19 PROCEDURE — G8399 PT W/DXA RESULTS DOCUMENT: HCPCS | Performed by: NURSE PRACTITIONER

## 2017-10-19 PROCEDURE — 90688 IIV4 VACCINE SPLT 0.5 ML IM: CPT | Performed by: NURSE PRACTITIONER

## 2017-10-19 PROCEDURE — 4005F PHARM THX FOR OP RXD: CPT | Performed by: NURSE PRACTITIONER

## 2017-10-19 PROCEDURE — 3014F SCREEN MAMMO DOC REV: CPT | Performed by: NURSE PRACTITIONER

## 2017-10-19 PROCEDURE — 3017F COLORECTAL CA SCREEN DOC REV: CPT | Performed by: NURSE PRACTITIONER

## 2017-10-19 PROCEDURE — 4040F PNEUMOC VAC/ADMIN/RCVD: CPT | Performed by: NURSE PRACTITIONER

## 2017-10-19 PROCEDURE — 99214 OFFICE O/P EST MOD 30 MIN: CPT | Performed by: NURSE PRACTITIONER

## 2017-10-19 PROCEDURE — 1123F ACP DISCUSS/DSCN MKR DOCD: CPT | Performed by: NURSE PRACTITIONER

## 2017-10-19 PROCEDURE — 1036F TOBACCO NON-USER: CPT | Performed by: NURSE PRACTITIONER

## 2017-10-19 PROCEDURE — G8417 CALC BMI ABV UP PARAM F/U: HCPCS | Performed by: NURSE PRACTITIONER

## 2017-10-19 PROCEDURE — 1090F PRES/ABSN URINE INCON ASSESS: CPT | Performed by: NURSE PRACTITIONER

## 2017-10-19 PROCEDURE — G8484 FLU IMMUNIZE NO ADMIN: HCPCS | Performed by: NURSE PRACTITIONER

## 2017-10-19 PROCEDURE — 83036 HEMOGLOBIN GLYCOSYLATED A1C: CPT | Performed by: NURSE PRACTITIONER

## 2017-10-19 PROCEDURE — G0009 ADMIN PNEUMOCOCCAL VACCINE: HCPCS | Performed by: NURSE PRACTITIONER

## 2017-10-19 PROCEDURE — 90670 PCV13 VACCINE IM: CPT | Performed by: NURSE PRACTITIONER

## 2017-10-19 PROCEDURE — G8427 DOCREV CUR MEDS BY ELIG CLIN: HCPCS | Performed by: NURSE PRACTITIONER

## 2017-10-19 PROCEDURE — 3046F HEMOGLOBIN A1C LEVEL >9.0%: CPT | Performed by: NURSE PRACTITIONER

## 2017-10-19 RX ORDER — ALENDRONATE SODIUM 70 MG/1
TABLET ORAL
Qty: 4 TABLET | Refills: 3 | Status: SHIPPED | OUTPATIENT
Start: 2017-10-19 | End: 2018-03-05 | Stop reason: SDUPTHER

## 2017-10-19 RX ORDER — ESCITALOPRAM OXALATE 10 MG/1
TABLET ORAL
Qty: 90 TABLET | Refills: 3 | Status: SHIPPED | OUTPATIENT
Start: 2017-10-19 | End: 2018-03-05 | Stop reason: SDUPTHER

## 2017-10-19 RX ORDER — MELOXICAM 15 MG/1
15 TABLET ORAL DAILY
Qty: 14 TABLET | Refills: 0 | Status: SHIPPED | OUTPATIENT
Start: 2017-10-19 | End: 2018-03-05

## 2017-10-19 RX ORDER — TIZANIDINE 4 MG/1
4 TABLET ORAL NIGHTLY
Qty: 30 TABLET | Refills: 0 | Status: SHIPPED | OUTPATIENT
Start: 2017-10-19 | End: 2017-11-29 | Stop reason: ALTCHOICE

## 2017-10-19 RX ORDER — FLUTICASONE PROPIONATE 50 MCG
SPRAY, SUSPENSION (ML) NASAL
Qty: 16 G | Refills: 3 | Status: SHIPPED | OUTPATIENT
Start: 2017-10-19 | End: 2018-02-20 | Stop reason: SDUPTHER

## 2017-10-19 RX ORDER — ATORVASTATIN CALCIUM 20 MG/1
20 TABLET, FILM COATED ORAL DAILY
Qty: 90 TABLET | Refills: 3 | Status: SHIPPED | OUTPATIENT
Start: 2017-10-19 | End: 2018-03-05 | Stop reason: SDUPTHER

## 2017-10-19 RX ORDER — ASPIRIN 81 MG/1
81 TABLET ORAL DAILY
Qty: 90 TABLET | Refills: 3 | Status: SHIPPED | OUTPATIENT
Start: 2017-10-19 | End: 2018-03-05 | Stop reason: SDUPTHER

## 2017-10-19 RX ORDER — MONTELUKAST SODIUM 10 MG/1
TABLET ORAL
Qty: 90 TABLET | Refills: 3 | Status: SHIPPED | OUTPATIENT
Start: 2017-10-19 | End: 2018-03-05 | Stop reason: SDUPTHER

## 2017-10-19 RX ORDER — ATENOLOL 50 MG/1
TABLET ORAL
Qty: 90 TABLET | Refills: 3 | Status: SHIPPED | OUTPATIENT
Start: 2017-10-19 | End: 2018-03-05 | Stop reason: SDUPTHER

## 2017-10-19 RX ORDER — LEVOTHYROXINE SODIUM 0.03 MG/1
TABLET ORAL
Qty: 90 TABLET | Refills: 3 | Status: SHIPPED | OUTPATIENT
Start: 2017-10-19 | End: 2018-03-05 | Stop reason: SDUPTHER

## 2017-10-19 RX ORDER — LISINOPRIL 5 MG/1
TABLET ORAL
Qty: 90 TABLET | Refills: 3 | Status: SHIPPED | OUTPATIENT
Start: 2017-10-19 | End: 2018-03-05 | Stop reason: SDUPTHER

## 2017-10-19 RX ORDER — ESOMEPRAZOLE MAGNESIUM 40 MG/1
CAPSULE, DELAYED RELEASE ORAL
Qty: 90 CAPSULE | Refills: 3 | Status: SHIPPED | OUTPATIENT
Start: 2017-10-19 | End: 2018-03-05 | Stop reason: SDUPTHER

## 2017-10-19 ASSESSMENT — ENCOUNTER SYMPTOMS
BACK PAIN: 1
RESPIRATORY NEGATIVE: 1
GASTROINTESTINAL NEGATIVE: 1

## 2017-10-19 NOTE — PROGRESS NOTES
SUBJECTIVE:    Patient ID: James Torres is a 81 y.o. female. Chief Complaint   Patient presents with    Hypertension    Hyperlipidemia    Hypothyroidism    Discuss Labs         HPI:  She presents about her htn, thyroid and cholesterol. She had labs completed. She says she is doing well with her medications. She denies any side effects. She checked her blood sugar is is usually 100-130. Last night it was 87. Her diet is back and forth. She is having  Some pain in her spine between her shoulder blades. It hurts to pull her arms flexing at the elbow. She can raise her hands above her head without pain. She says the pain is sharp. She says it feels like a knife twisting. She has not taken anything for it but has tried to work it out with exercise. She says sometimes it doesn't hurt at all. It has been going on for about 3 weeks. She thinks that going bowling with her Religious started it. She denies any problems with her thyroid  Or blood pressure. Patient's medications, allergies, past medical, surgical, social and family histories were reviewed and updated as appropriate.      Current Outpatient Prescriptions:     montelukast (SINGULAIR) 10 MG tablet, take 1 tablet by mouth at bedtime, Disp: 90 tablet, Rfl: 3    atenolol (TENORMIN) 50 MG tablet, take 1 tablet by mouth once daily, Disp: 90 tablet, Rfl: 3    escitalopram (LEXAPRO) 10 MG tablet, take 1 tablet by mouth once daily, Disp: 90 tablet, Rfl: 3    atorvastatin (LIPITOR) 20 MG tablet, Take 1 tablet by mouth daily, Disp: 90 tablet, Rfl: 3    levothyroxine (SYNTHROID) 25 MCG tablet, take 1 tablet by mouth once daily, Disp: 90 tablet, Rfl: 3    lisinopril (PRINIVIL;ZESTRIL) 5 MG tablet, take 1 tablet by mouth once daily, Disp: 90 tablet, Rfl: 3    alendronate (FOSAMAX) 70 MG tablet, take 1 tablet by mouth every week, Disp: 4 tablet, Rfl: 3    esomeprazole (NEXIUM) 40 MG delayed release capsule, take 1 capsule by mouth every morning Pulse 68   Resp 20   Ht 5' 2\" (1.575 m)   Wt 163 lb 6.4 oz (74.1 kg)   SpO2 98%   Breastfeeding? No   BMI 29.89 kg/m²    Physical Exam   Constitutional: She is oriented to person, place, and time. She appears well-developed and well-nourished. HENT:   Head: Normocephalic and atraumatic. Right Ear: External ear normal.   Left Ear: External ear normal.   Nose: Nose normal.   Eyes: Conjunctivae and EOM are normal. Pupils are equal, round, and reactive to light. Neck: Neck supple. No JVD present. No thyromegaly present. Cardiovascular: Normal rate, regular rhythm and normal heart sounds. Pulmonary/Chest: Effort normal and breath sounds normal. She has no wheezes. She has no rales. Abdominal: Soft. Bowel sounds are normal. She exhibits no distension. There is no tenderness. Musculoskeletal: She exhibits no edema. Thoracic back: She exhibits decreased range of motion (mild kyphosis), tenderness (worse on the right side) and spasm (spasm on her right side). Neurological: She is alert and oriented to person, place, and time. Skin: No rash noted. No erythema. Psychiatric: She has a normal mood and affect. Judgment normal.   Nursing note and vitals reviewed.       Results in Past 30 Days  Result Component Current Result Ref Range Previous Result Ref Range   Alb 4.6 (10/16/2017) 3.4 - 4.8 g/dL Not in Time Range    Albumin/Globulin Ratio 2.1 (H) (10/16/2017) 0.8 - 2.0 Not in Time Range    Alkaline Phosphatase 117 (H) (10/16/2017) 25 - 100 U/L Not in Time Range    ALT 18 (10/16/2017) 4 - 36 U/L Not in Time Range    AST 15 (10/16/2017) 8 - 33 U/L Not in Time Range    BUN 21 (H) (10/16/2017) 6 - 20 mg/dL Not in Time Range    Calcium 9.7 (10/16/2017) 8.5 - 10.5 mg/dL Not in Time Range    Chloride 101 (10/16/2017) 98 - 107 mmol/L Not in Time Range    CO2 29 (10/16/2017) 20 - 30 mmol/L Not in Time Range    CREATININE 0.8 (10/16/2017) 0.4 - 1.2 mg/dL Not in Time Range    GFR  >59

## 2017-10-31 ENCOUNTER — NURSE ONLY (OUTPATIENT)
Dept: PRIMARY CARE CLINIC | Age: 72
End: 2017-10-31
Payer: MEDICARE

## 2017-10-31 ENCOUNTER — HOSPITAL ENCOUNTER (OUTPATIENT)
Dept: OTHER | Age: 72
Discharge: HOME | End: 2017-10-31
Attending: NURSE PRACTITIONER | Admitting: NURSE PRACTITIONER

## 2017-10-31 DIAGNOSIS — M54.6 ACUTE BILATERAL THORACIC BACK PAIN: ICD-10-CM

## 2017-10-31 DIAGNOSIS — E53.8 VITAMIN B12 DEFICIENCY: Primary | ICD-10-CM

## 2017-10-31 PROCEDURE — 96372 THER/PROPH/DIAG INJ SC/IM: CPT | Performed by: PEDIATRICS

## 2017-10-31 RX ORDER — CYANOCOBALAMIN 1000 UG/ML
1000 INJECTION INTRAMUSCULAR; SUBCUTANEOUS ONCE
Status: COMPLETED | OUTPATIENT
Start: 2017-10-31 | End: 2017-10-31

## 2017-10-31 RX ADMIN — CYANOCOBALAMIN 1000 MCG: 1000 INJECTION INTRAMUSCULAR; SUBCUTANEOUS at 13:44

## 2017-10-31 NOTE — PROGRESS NOTES
After obtaining consent, and per orders of Dr. Norbert Patton, injection of B 12 given in Right deltoid by Lynn English. Patient instructed to remain in clinic for 20 minutes afterwards, and to report any adverse reaction to me immediately.

## 2017-11-01 ENCOUNTER — TELEPHONE (OUTPATIENT)
Dept: PRIMARY CARE CLINIC | Age: 72
End: 2017-11-01

## 2017-11-02 ENCOUNTER — OFFICE VISIT (OUTPATIENT)
Dept: PRIMARY CARE CLINIC | Age: 72
End: 2017-11-02
Payer: MEDICARE

## 2017-11-02 VITALS
DIASTOLIC BLOOD PRESSURE: 70 MMHG | BODY MASS INDEX: 29.89 KG/M2 | OXYGEN SATURATION: 97 % | WEIGHT: 163.4 LBS | SYSTOLIC BLOOD PRESSURE: 118 MMHG | RESPIRATION RATE: 20 BRPM | HEART RATE: 70 BPM

## 2017-11-02 DIAGNOSIS — E03.9 ACQUIRED HYPOTHYROIDISM: ICD-10-CM

## 2017-11-02 DIAGNOSIS — E78.00 PURE HYPERCHOLESTEROLEMIA: ICD-10-CM

## 2017-11-02 DIAGNOSIS — E11.9 TYPE 2 DIABETES MELLITUS WITHOUT COMPLICATION, WITHOUT LONG-TERM CURRENT USE OF INSULIN (HCC): Primary | ICD-10-CM

## 2017-11-02 PROCEDURE — 1090F PRES/ABSN URINE INCON ASSESS: CPT | Performed by: NURSE PRACTITIONER

## 2017-11-02 PROCEDURE — G8484 FLU IMMUNIZE NO ADMIN: HCPCS | Performed by: NURSE PRACTITIONER

## 2017-11-02 PROCEDURE — 1036F TOBACCO NON-USER: CPT | Performed by: NURSE PRACTITIONER

## 2017-11-02 PROCEDURE — 3014F SCREEN MAMMO DOC REV: CPT | Performed by: NURSE PRACTITIONER

## 2017-11-02 PROCEDURE — G8417 CALC BMI ABV UP PARAM F/U: HCPCS | Performed by: NURSE PRACTITIONER

## 2017-11-02 PROCEDURE — 3044F HG A1C LEVEL LT 7.0%: CPT | Performed by: NURSE PRACTITIONER

## 2017-11-02 PROCEDURE — 4040F PNEUMOC VAC/ADMIN/RCVD: CPT | Performed by: NURSE PRACTITIONER

## 2017-11-02 PROCEDURE — 99214 OFFICE O/P EST MOD 30 MIN: CPT | Performed by: NURSE PRACTITIONER

## 2017-11-02 PROCEDURE — 1123F ACP DISCUSS/DSCN MKR DOCD: CPT | Performed by: NURSE PRACTITIONER

## 2017-11-02 PROCEDURE — G8399 PT W/DXA RESULTS DOCUMENT: HCPCS | Performed by: NURSE PRACTITIONER

## 2017-11-02 PROCEDURE — 3017F COLORECTAL CA SCREEN DOC REV: CPT | Performed by: NURSE PRACTITIONER

## 2017-11-02 PROCEDURE — G8428 CUR MEDS NOT DOCUMENT: HCPCS | Performed by: NURSE PRACTITIONER

## 2017-11-02 NOTE — PROGRESS NOTES
Take 1 tablet by mouth daily, Disp: 90 tablet, Rfl: 3    fluticasone (FLONASE) 50 MCG/ACT nasal spray, instill 2 sprays into each nostril once daily. 90 day supply, Disp: 16 g, Rfl: 3    tiZANidine (ZANAFLEX) 4 MG tablet, Take 1 tablet by mouth nightly, Disp: 30 tablet, Rfl: 0    meloxicam (MOBIC) 15 MG tablet, Take 1 tablet by mouth daily for 14 days, Disp: 14 tablet, Rfl: 0    ergocalciferol (DRISDOL) 54640 UNITS capsule, Take 1 capsule by mouth once a week 90 day supply, Disp: 12 capsule, Rfl: 3    glucose blood VI test strips (ACCU-CHEK TAYLA PLUS) strip, use 1 three times a day, Disp: 100 strip, Rfl: 3    nitroGLYCERIN (NITROSTAT) 0.4 MG SL tablet, Place 1 tablet under the tongue as needed for Chest pain, Disp: 25 tablet, Rfl: 3    Accu-Chek Multiclix Lancets MISC, Finger stick tid, Disp: 100 each, Rfl: 3    Blood Glucose Monitoring Suppl (ACCU-CHEK TAYLA PLUS) W/DEVICE KIT, 1 kit by Does not apply route 3 times daily as needed Dx:250.00, Disp: 1 kit, Rfl: 0    fexofenadine (ALLEGRA) 180 MG tablet, Take 1 tablet by mouth daily, Disp: 90 tablet, Rfl: 3    calcium carbonate 600 MG TABS tablet, Take 1 tablet by mouth daily. , Disp: , Rfl:     therapeutic multivitamin-minerals (THERAGRAN-M) tablet, Take 1 tablet by mouth daily. , Disp: , Rfl:     Review of Systems    OBJECTIVE:  /70   Pulse 70   Resp 20   Wt 163 lb 6.4 oz (74.1 kg)   SpO2 97%   Breastfeeding?  No   BMI 29.89 kg/m²    Physical Exam    Results in Past 30 Days  Result Component Current Result Ref Range Previous Result Ref Range   Alb 3.9 (11/6/2017) 3.4 - 4.8 g/dL 4.6 (10/16/2017) 3.4 - 4.8 g/dL   Albumin/Globulin Ratio 2.1 (H) (10/16/2017) 0.8 - 2.0 Not in Time Range    Alkaline Phosphatase 94 (11/6/2017) 25 - 100 U/L 117 (H) (10/16/2017) 25 - 100 U/L   ALT 30 (11/6/2017) 4 - 36 U/L 18 (10/16/2017) 4 - 36 U/L   AST 33 (11/6/2017) 8 - 33 U/L 15 (10/16/2017) 8 - 33 U/L   BUN 21 (H) (10/16/2017) 6 - 20 mg/dL Not in Time Range Hemoglobin A1C; Future    2. Pure hypercholesterolemia  I have advised her on low-fat diet, exercise and weight control. I am going to continue on current medication. I have also advised her on the possible side effects from the medication. I will monitor her liver functions and lipid profile every few months. Lipid well controlled. Lab Results   Component Value Date    LDLCALC 81 10/16/2017    LDLDIRECT 82 04/07/2015         3. Acquired hypothyroidism  Lab Results   Component Value Date    TSH 4.01 10/16/2017       - TSH without Reflex;  Future

## 2017-11-03 ENCOUNTER — OFFICE VISIT (OUTPATIENT)
Dept: PRIMARY CARE CLINIC | Age: 72
End: 2017-11-03
Payer: MEDICARE

## 2017-11-03 VITALS
BODY MASS INDEX: 29.63 KG/M2 | OXYGEN SATURATION: 96 % | TEMPERATURE: 98.9 F | HEIGHT: 62 IN | HEART RATE: 86 BPM | DIASTOLIC BLOOD PRESSURE: 78 MMHG | SYSTOLIC BLOOD PRESSURE: 134 MMHG | WEIGHT: 161 LBS | RESPIRATION RATE: 20 BRPM

## 2017-11-03 DIAGNOSIS — K52.9 GASTROENTERITIS: Primary | ICD-10-CM

## 2017-11-03 PROBLEM — J01.00 ACUTE NON-RECURRENT MAXILLARY SINUSITIS: Status: RESOLVED | Noted: 2017-01-18 | Resolved: 2017-11-03

## 2017-11-03 PROCEDURE — 3014F SCREEN MAMMO DOC REV: CPT | Performed by: FAMILY MEDICINE

## 2017-11-03 PROCEDURE — G8417 CALC BMI ABV UP PARAM F/U: HCPCS | Performed by: FAMILY MEDICINE

## 2017-11-03 PROCEDURE — G8484 FLU IMMUNIZE NO ADMIN: HCPCS | Performed by: FAMILY MEDICINE

## 2017-11-03 PROCEDURE — G8399 PT W/DXA RESULTS DOCUMENT: HCPCS | Performed by: FAMILY MEDICINE

## 2017-11-03 PROCEDURE — 1036F TOBACCO NON-USER: CPT | Performed by: FAMILY MEDICINE

## 2017-11-03 PROCEDURE — 1090F PRES/ABSN URINE INCON ASSESS: CPT | Performed by: FAMILY MEDICINE

## 2017-11-03 PROCEDURE — 4040F PNEUMOC VAC/ADMIN/RCVD: CPT | Performed by: FAMILY MEDICINE

## 2017-11-03 PROCEDURE — 1123F ACP DISCUSS/DSCN MKR DOCD: CPT | Performed by: FAMILY MEDICINE

## 2017-11-03 PROCEDURE — 99213 OFFICE O/P EST LOW 20 MIN: CPT | Performed by: FAMILY MEDICINE

## 2017-11-03 PROCEDURE — G8427 DOCREV CUR MEDS BY ELIG CLIN: HCPCS | Performed by: FAMILY MEDICINE

## 2017-11-03 PROCEDURE — 3017F COLORECTAL CA SCREEN DOC REV: CPT | Performed by: FAMILY MEDICINE

## 2017-11-03 RX ORDER — ONDANSETRON 8 MG/1
8 TABLET, ORALLY DISINTEGRATING ORAL EVERY 8 HOURS PRN
Qty: 20 TABLET | Refills: 0 | Status: SHIPPED | OUTPATIENT
Start: 2017-11-03 | End: 2017-11-29 | Stop reason: ALTCHOICE

## 2017-11-03 ASSESSMENT — ENCOUNTER SYMPTOMS
VOMITING: 1
NAUSEA: 1
RHINORRHEA: 1
CONSTIPATION: 0
COUGH: 1
DIARRHEA: 1
SHORTNESS OF BREATH: 0
ABDOMINAL PAIN: 1
SORE THROAT: 0

## 2017-11-03 NOTE — PATIENT INSTRUCTIONS
· Try clear liquids first (broth, gatorade, sprite, popsicle, jello) then advance toast, crackers, yogurt, bananas, applesauce. · Keep a list of your medicines with you. List all of the prescription medicines, nonprescription medicines, supplements, natural remedies, and vitamins that you take. Tell your healthcare providers who treat you about all of the products you are taking. Your provider can provide you with a form to keep track of them. Just ask. · Follow the directions that come with your medicine, including information about food or alcohol. Make sure you know how and when to take your medicine. Do not take more or less than you are supposed to take. · Keep all medicines out of the reach of children. · Store medicines according to the directions on the label. · Monitor yourself. Learn to know how your body reacts to your new medicine and keep track of how it makes you feel before attempting (If your provider has allowed you to do so) to drive or go to work. · Seek emergency medical attention if you think you have used too much of this medicine. An overdose of any prescription medicine can be fatal. Overdose symptoms may include extreme drowsiness, muscle weakness, confusion, cold and clammy skin, pinpoint pupils, shallow breathing, slow heart rate, fainting, or coma. · Don't share prescription medicines with others, even when they seem to have the same symptoms. What may be good for you may be harmful to others. · If you are no longer taking a prescribed medication and you have pills left please take your pills out of their original containers. Mix crushed pills with an undesirable substance, such as cat litter or used coffee grounds. Put the mixture into a disposable container with a lid, such as an empty margarine tub, or into a sealable bag. Cover up or remove any of your personal information on the empty containers by covering it with black permanent marker or duct tape.   Place the sealed container with the mixture, and the empty drug containers, in the trash. · If you use a medication that is in the form of a patch, dispose of used patches by folding them in half so that the sticky sides meet, and then flushing them down a toilet. They should not be placed in the household trash where children or pets can find them. · If you have any questions, ask your provider or pharmacist for more information. · Be sure to keep all appointments for provider visits or tests. We are committed to providing you with the best care possible. In order to help us achieve these goals please remember to bring all medications, herbal products, and over the counter supplements with you to each visit. If your provider has ordered testing for you, please be sure to follow up with our office if you have not received results within 7 days after the testing took place. *If you receive a survey after visiting one of our offices, please take time to share your experience concerning your physician office visit. These surveys are confidential and no health information about you is shared. We are eager to improve for you and we are counting on your feedback to help make that happen.

## 2017-11-03 NOTE — ASSESSMENT & PLAN NOTE
Patient is to take Zofran as needed for nausea and vomiting control. She has been encouraged to start with a clear liquid diet and advance to a bland, soft diet as tolerated. Due to the red discoloration to the patient's stool this morning, will give her an order for her GI pathogens. She has been instructed to collect stool if it remains red. Will rule out bacterial etiology.

## 2017-11-03 NOTE — PROGRESS NOTES
SUBJECTIVE:    Patient ID: Theresa Jefferson is a 81 y.o. female. Chief Complaint   Patient presents with    Emesis     x 1 , states it is better, it comes in spells    Diarrhea         HPI: Patient has not felt well for the past day. She reports multiple episodes of vomiting and diarrhea. She reports nausea. She thinks she has a fever and has felt chilled. Denies any sick contact. Diarrhea is watery. She does report red to her BM. She does admit to eating strawberry jello this morning and isn't sure if that's what it is. Denies any blood in emesis. She does admit to stomach cramping. She is not tolerating solids or liquids currently. Patient's medications, allergies, past medical, surgical, social and family histories were reviewed and updated as appropriate. Review of Systems   Constitutional: Positive for chills and fever. Negative for fatigue. HENT: Positive for rhinorrhea. Negative for congestion, ear pain and sore throat. Respiratory: Positive for cough. Negative for shortness of breath. Cardiovascular: Negative for chest pain, palpitations and leg swelling. Gastrointestinal: Positive for abdominal pain, diarrhea, nausea and vomiting. Negative for constipation. Musculoskeletal: Negative for arthralgias and joint swelling. Neurological: Positive for headaches. Negative for weakness. OBJECTIVE:  /78 (Site: Left Arm, Position: Sitting)   Pulse 86   Temp 98.9 °F (37.2 °C) (Oral)   Resp 20   Ht 5' 2\" (1.575 m)   Wt 161 lb (73 kg)   SpO2 96% Comment: room air  BMI 29.45 kg/m²      Wt Readings from Last 2 Encounters:   11/03/17 161 lb (73 kg)   11/02/17 163 lb 6.4 oz (74.1 kg)     BP Readings from Last 2 Encounters:   11/03/17 134/78   11/02/17 118/70      Pulse Readings from Last 2 Encounters:   11/03/17 86   11/02/17 70     Body mass index is 29.45 kg/m². Physical Exam   Constitutional: She is oriented to person, place, and time.  She appears well-developed and well-nourished. HENT:   Head: Normocephalic and atraumatic. Right Ear: External ear normal.   Left Ear: External ear normal.   Mouth/Throat: Oropharynx is clear and moist.   Eyes: Conjunctivae and EOM are normal.   Neck: Neck supple. Cardiovascular: Normal rate, regular rhythm and normal heart sounds. Exam reveals no gallop and no friction rub. No murmur heard. Pulmonary/Chest: Effort normal and breath sounds normal. No respiratory distress. Abdominal: Soft. Bowel sounds are normal. She exhibits no distension. There is no tenderness. Lymphadenopathy:     She has no cervical adenopathy. Neurological: She is alert and oriented to person, place, and time. Skin: Skin is warm and dry.        Results in Past 30 Days  Result Component Current Result Ref Range Previous Result Ref Range   Alb 4.6 (10/16/2017) 3.4 - 4.8 g/dL Not in Time Range    Albumin/Globulin Ratio 2.1 (H) (10/16/2017) 0.8 - 2.0 Not in Time Range    Alkaline Phosphatase 117 (H) (10/16/2017) 25 - 100 U/L Not in Time Range    ALT 18 (10/16/2017) 4 - 36 U/L Not in Time Range    AST 15 (10/16/2017) 8 - 33 U/L Not in Time Range    BUN 21 (H) (10/16/2017) 6 - 20 mg/dL Not in Time Range    Calcium 9.7 (10/16/2017) 8.5 - 10.5 mg/dL Not in Time Range    Chloride 101 (10/16/2017) 98 - 107 mmol/L Not in Time Range    CO2 29 (10/16/2017) 20 - 30 mmol/L Not in Time Range    CREATININE 0.8 (10/16/2017) 0.4 - 1.2 mg/dL Not in Time Range    GFR  >59 (10/16/2017) >59 Not in Time Range    GFR Non- >60 (10/16/2017) >59 Not in Time Range    Globulin 2.2 (10/16/2017) g/dL Not in Time Range    Glucose 108 (H) (10/16/2017) 74 - 106 mg/dL Not in Time Range    Potassium 4.6 (10/16/2017) 3.4 - 5.1 mmol/L Not in Time Range    Sodium 141 (10/16/2017) 136 - 145 mmol/L Not in Time Range    Total Bilirubin 0.4 (10/16/2017) 0.3 - 1.2 mg/dL Not in Time Range    Total Protein 6.8 (10/16/2017) 6.4 - 8.3 g/dL Not in Time Range        Hemoglobin A1C (%)   Date Value   10/19/2017 6.6     MICROALBUMIN, RANDOM URINE (mg/dL)   Date Value   04/17/2017 <1.20     LDL Calculated (mg/dL)   Date Value   10/16/2017 81         Lab Results   Component Value Date    WBC 6.9 10/16/2017    NEUTROABS 3.4 04/07/2015    HGB 12.7 10/16/2017    HCT 39.7 10/16/2017    MCV 90.6 10/16/2017     10/16/2017       Lab Results   Component Value Date    TSH 4.01 10/16/2017         ASSESSMENT/PLAN:   Problem List Items Addressed This Visit     Gastroenteritis - Primary     Patient is to take Zofran as needed for nausea and vomiting control. She has been encouraged to start with a clear liquid diet and advance to a bland, soft diet as tolerated. Due to the red discoloration to the patient's stool this morning, will give her an order for her GI pathogens. She has been instructed to collect stool if it remains red. Will rule out bacterial etiology. Relevant Orders    GI Bacterial Pathogens By PCR      Other Visit Diagnoses    None. Return if symptoms worsen or fail to improve.

## 2017-11-06 ENCOUNTER — OFFICE VISIT (OUTPATIENT)
Dept: PRIMARY CARE CLINIC | Age: 72
End: 2017-11-06
Payer: MEDICARE

## 2017-11-06 ENCOUNTER — HOSPITAL ENCOUNTER (OUTPATIENT)
Dept: OTHER | Age: 72
Discharge: HOME | End: 2017-11-06
Attending: PEDIATRICS | Admitting: PEDIATRICS

## 2017-11-06 VITALS
DIASTOLIC BLOOD PRESSURE: 72 MMHG | HEART RATE: 64 BPM | HEIGHT: 62 IN | SYSTOLIC BLOOD PRESSURE: 138 MMHG | BODY MASS INDEX: 28.45 KG/M2 | OXYGEN SATURATION: 98 % | RESPIRATION RATE: 16 BRPM | WEIGHT: 154.6 LBS | TEMPERATURE: 97.8 F

## 2017-11-06 DIAGNOSIS — R10.13 EPIGASTRIC ABDOMINAL PAIN: Primary | ICD-10-CM

## 2017-11-06 DIAGNOSIS — R11.0 NAUSEA: ICD-10-CM

## 2017-11-06 DIAGNOSIS — R10.13 EPIGASTRIC ABDOMINAL PAIN: ICD-10-CM

## 2017-11-06 PROCEDURE — G8427 DOCREV CUR MEDS BY ELIG CLIN: HCPCS | Performed by: PEDIATRICS

## 2017-11-06 PROCEDURE — 1036F TOBACCO NON-USER: CPT | Performed by: PEDIATRICS

## 2017-11-06 PROCEDURE — 3017F COLORECTAL CA SCREEN DOC REV: CPT | Performed by: PEDIATRICS

## 2017-11-06 PROCEDURE — 1090F PRES/ABSN URINE INCON ASSESS: CPT | Performed by: PEDIATRICS

## 2017-11-06 PROCEDURE — 99213 OFFICE O/P EST LOW 20 MIN: CPT | Performed by: PEDIATRICS

## 2017-11-06 PROCEDURE — 4040F PNEUMOC VAC/ADMIN/RCVD: CPT | Performed by: PEDIATRICS

## 2017-11-06 PROCEDURE — 1123F ACP DISCUSS/DSCN MKR DOCD: CPT | Performed by: PEDIATRICS

## 2017-11-06 PROCEDURE — G8399 PT W/DXA RESULTS DOCUMENT: HCPCS | Performed by: PEDIATRICS

## 2017-11-06 PROCEDURE — G8417 CALC BMI ABV UP PARAM F/U: HCPCS | Performed by: PEDIATRICS

## 2017-11-06 PROCEDURE — G8484 FLU IMMUNIZE NO ADMIN: HCPCS | Performed by: PEDIATRICS

## 2017-11-06 PROCEDURE — 3014F SCREEN MAMMO DOC REV: CPT | Performed by: PEDIATRICS

## 2017-11-06 RX ORDER — PROMETHAZINE HYDROCHLORIDE 25 MG/1
25 SUPPOSITORY RECTAL EVERY 6 HOURS PRN
Qty: 12 SUPPOSITORY | Refills: 1 | Status: SHIPPED | OUTPATIENT
Start: 2017-11-06 | End: 2017-11-13

## 2017-11-06 ASSESSMENT — ENCOUNTER SYMPTOMS
EYES NEGATIVE: 1
COUGH: 0
VOMITING: 0
BACK PAIN: 0
ABDOMINAL PAIN: 1
WHEEZING: 0
CONSTIPATION: 0
SORE THROAT: 0
EYE DISCHARGE: 0
RESPIRATORY NEGATIVE: 1
SINUS PRESSURE: 0
SHORTNESS OF BREATH: 0
NAUSEA: 1
DIARRHEA: 1

## 2017-11-06 NOTE — PROGRESS NOTES
SUBJECTIVE:    Patient ID: Shanta Gonzalez is a 81 y.o. female. Chief Complaint   Patient presents with    Abdominal Pain       HPI:    Patient's medications, allergies, past medical, surgical, social and family histories were reviewed and updated as appropriate. The patient reports on going upper abd pain that is moderate to severe. She denies any travel, exotic pets or drinking well water. Denies any antibiotic recently. Abdominal Pain   This is a new problem. The current episode started in the past 7 days. The onset quality is gradual. The problem occurs intermittently. The problem has been waxing and waning. The pain is located in the epigastric region. The pain is at a severity of 6/10. The pain is moderate. The quality of the pain is aching. The abdominal pain does not radiate. Associated symptoms include diarrhea and nausea. Pertinent negatives include no anorexia, arthralgias, constipation, dysuria, fever, frequency, headaches, melena or vomiting. Nothing aggravates the pain. The pain is relieved by nothing. Treatments tried: zofran. The treatment provided no relief. Review of Systems   Constitutional: Negative for chills and fever. HENT: Negative for congestion, sinus pressure and sore throat. Eyes: Negative for discharge and visual disturbance. Respiratory: Negative for cough, shortness of breath and wheezing. Cardiovascular: Negative for chest pain and palpitations. Gastrointestinal: Positive for abdominal pain, diarrhea and nausea. Negative for anorexia, constipation, melena and vomiting. Endocrine: Negative for cold intolerance and heat intolerance. Genitourinary: Negative for dysuria, frequency and urgency. Musculoskeletal: Negative for arthralgias and back pain. Skin: Negative for rash and wound. Neurological: Negative for syncope, numbness and headaches. Hematological: Negative. Psychiatric/Behavioral: Negative for agitation and sleep disturbance.  The motion. Neck supple. No JVD present. No tracheal deviation present. No thyromegaly present. Cardiovascular: Normal rate, regular rhythm and normal heart sounds. No murmur heard. Pulmonary/Chest: Effort normal and breath sounds normal. No stridor. No respiratory distress. She has no wheezes. She has no rales. She exhibits no tenderness. Abdominal: Soft. Bowel sounds are normal. She exhibits no distension and no mass. There is tenderness. There is no rebound and no guarding. Musculoskeletal: Normal range of motion. She exhibits no edema or tenderness. Lymphadenopathy:     She has no cervical adenopathy. Neurological: She is alert and oriented to person, place, and time. Skin: Skin is warm and dry. No rash noted. She is not diaphoretic. Psychiatric: She has a normal mood and affect. Nursing note and vitals reviewed.     Results in Past 30 Days  Result Component Current Result Ref Range Previous Result Ref Range   Alb 4.6 (10/16/2017) 3.4 - 4.8 g/dL Not in Time Range    Albumin/Globulin Ratio 2.1 (H) (10/16/2017) 0.8 - 2.0 Not in Time Range    Alkaline Phosphatase 117 (H) (10/16/2017) 25 - 100 U/L Not in Time Range    ALT 18 (10/16/2017) 4 - 36 U/L Not in Time Range    AST 15 (10/16/2017) 8 - 33 U/L Not in Time Range    BUN 21 (H) (10/16/2017) 6 - 20 mg/dL Not in Time Range    Calcium 9.7 (10/16/2017) 8.5 - 10.5 mg/dL Not in Time Range    Chloride 101 (10/16/2017) 98 - 107 mmol/L Not in Time Range    CO2 29 (10/16/2017) 20 - 30 mmol/L Not in Time Range    CREATININE 0.8 (10/16/2017) 0.4 - 1.2 mg/dL Not in Time Range    GFR  >59 (10/16/2017) >59 Not in Time Range    GFR Non- >60 (10/16/2017) >59 Not in Time Range    Globulin 2.2 (10/16/2017) g/dL Not in Time Range    Glucose 108 (H) (10/16/2017) 74 - 106 mg/dL Not in Time Range    Potassium 4.6 (10/16/2017) 3.4 - 5.1 mmol/L Not in Time Range    Sodium 141 (10/16/2017) 136 - 145 mmol/L Not in Time Range    Total Bilirubin

## 2017-11-06 NOTE — PROGRESS NOTES
Patient ID: Salima Hernandez is a 81 y.o. female. Chief Complaint   Patient presents with    Abdominal Pain       Have you seen any other physician or provider since your last visit no    Have you had any other diagnostic tests since your last visit? yes -     Have you changed or stopped any medications since your last visit? no     Review of Systems   Constitutional: Negative. HENT: Negative. Eyes: Negative. Respiratory: Negative. Cardiovascular: Negative. Gastrointestinal: Positive for abdominal pain, diarrhea and nausea. Genitourinary: Negative. Musculoskeletal: Negative. Skin: Negative. Neurological: Positive for dizziness. Endo/Heme/Allergies: Negative. Psychiatric/Behavioral: Negative. Problems to be addressed this visit:    Patient c/o mid abdominal pain and nausea since last Thursday. She was seen by Dr Micheal Paulino and given Zofran, but is still having nausea and light headedness and now has diarrhea.

## 2017-11-07 LAB
ALBUMIN SERPL-MCNC: 3.9 G/DL (ref 3.4–4.8)
ALP BLD-CCNC: 94 U/L (ref 25–100)
ALT SERPL-CCNC: 30 U/L (ref 4–36)
AMYLASE: 54 U/L (ref 20–104)
AST SERPL-CCNC: 33 U/L (ref 8–33)
BILIRUB SERPL-MCNC: 0.4 MG/DL (ref 0.3–1.2)
BILIRUBIN DIRECT: <0.2 MG/DL (ref 0–0.2)
BILIRUBIN, INDIRECT: NORMAL MG/DL (ref 0.2–0.8)
LIPASE: 40 U/L (ref 5.6–51.3)
TOTAL PROTEIN: 6.6 G/DL (ref 6.4–8.3)

## 2017-11-08 ENCOUNTER — HOSPITAL ENCOUNTER (OUTPATIENT)
Dept: GENERAL RADIOLOGY | Age: 72
Discharge: HOME | End: 2017-11-08
Attending: PEDIATRICS | Admitting: PEDIATRICS

## 2017-11-08 DIAGNOSIS — R10.13 EPIGASTRIC ABDOMINAL PAIN: ICD-10-CM

## 2017-11-08 DIAGNOSIS — R10.13 EPIGASTRIC PAIN: ICD-10-CM

## 2017-11-29 ENCOUNTER — OFFICE VISIT (OUTPATIENT)
Dept: PRIMARY CARE CLINIC | Age: 72
End: 2017-11-29
Payer: MEDICARE

## 2017-11-29 VITALS
WEIGHT: 161 LBS | BODY MASS INDEX: 29.45 KG/M2 | DIASTOLIC BLOOD PRESSURE: 80 MMHG | SYSTOLIC BLOOD PRESSURE: 134 MMHG | OXYGEN SATURATION: 98 % | HEART RATE: 76 BPM | TEMPERATURE: 98 F | RESPIRATION RATE: 18 BRPM

## 2017-11-29 DIAGNOSIS — E53.8 VITAMIN B12 DEFICIENCY: ICD-10-CM

## 2017-11-29 DIAGNOSIS — J20.9 ACUTE BRONCHITIS, UNSPECIFIED ORGANISM: Primary | ICD-10-CM

## 2017-11-29 PROCEDURE — 1036F TOBACCO NON-USER: CPT | Performed by: NURSE PRACTITIONER

## 2017-11-29 PROCEDURE — 4040F PNEUMOC VAC/ADMIN/RCVD: CPT | Performed by: NURSE PRACTITIONER

## 2017-11-29 PROCEDURE — 3017F COLORECTAL CA SCREEN DOC REV: CPT | Performed by: NURSE PRACTITIONER

## 2017-11-29 PROCEDURE — 3014F SCREEN MAMMO DOC REV: CPT | Performed by: NURSE PRACTITIONER

## 2017-11-29 PROCEDURE — G8484 FLU IMMUNIZE NO ADMIN: HCPCS | Performed by: NURSE PRACTITIONER

## 2017-11-29 PROCEDURE — G8399 PT W/DXA RESULTS DOCUMENT: HCPCS | Performed by: NURSE PRACTITIONER

## 2017-11-29 PROCEDURE — 1123F ACP DISCUSS/DSCN MKR DOCD: CPT | Performed by: NURSE PRACTITIONER

## 2017-11-29 PROCEDURE — G8427 DOCREV CUR MEDS BY ELIG CLIN: HCPCS | Performed by: NURSE PRACTITIONER

## 2017-11-29 PROCEDURE — 1090F PRES/ABSN URINE INCON ASSESS: CPT | Performed by: NURSE PRACTITIONER

## 2017-11-29 PROCEDURE — G8417 CALC BMI ABV UP PARAM F/U: HCPCS | Performed by: NURSE PRACTITIONER

## 2017-11-29 PROCEDURE — 96372 THER/PROPH/DIAG INJ SC/IM: CPT | Performed by: NURSE PRACTITIONER

## 2017-11-29 PROCEDURE — 99213 OFFICE O/P EST LOW 20 MIN: CPT | Performed by: NURSE PRACTITIONER

## 2017-11-29 RX ORDER — AZITHROMYCIN 250 MG/1
TABLET, FILM COATED ORAL
Qty: 1 PACKET | Refills: 0 | Status: SHIPPED | OUTPATIENT
Start: 2017-11-29 | End: 2017-12-05 | Stop reason: ALTCHOICE

## 2017-11-29 RX ORDER — CYANOCOBALAMIN 1000 UG/ML
1000 INJECTION INTRAMUSCULAR; SUBCUTANEOUS ONCE
Status: COMPLETED | OUTPATIENT
Start: 2017-11-29 | End: 2017-11-29

## 2017-11-29 RX ORDER — BENZONATATE 200 MG/1
200 CAPSULE ORAL 3 TIMES DAILY PRN
Qty: 21 CAPSULE | Refills: 0 | Status: SHIPPED | OUTPATIENT
Start: 2017-11-29 | End: 2017-12-06

## 2017-11-29 RX ADMIN — CYANOCOBALAMIN 1000 MCG: 1000 INJECTION INTRAMUSCULAR; SUBCUTANEOUS at 15:38

## 2017-11-29 ASSESSMENT — ENCOUNTER SYMPTOMS
COUGH: 1
CHEST TIGHTNESS: 1

## 2017-11-29 NOTE — PROGRESS NOTES
Have you seen any other physician or provider since your last visit yes -pilar for gastroenteritis     Have you had any other diagnostic tests since your last visit? yes - labs last visit     Have you changed or stopped any medications since your last visit? yes - pt stopped zofran, zanaflex, and multivitamin. Pt is here co productive cough with clear foamy sputum,and  sinus congestion x about a week. Pt has not tried any otc meds for sx.

## 2017-11-29 NOTE — PROGRESS NOTES
SUBJECTIVE:    Patient ID: Natacha Schaefer is a 81 y.o. female. Chief Complaint   Patient presents with    Cough    Sinus Problem         HPI:  She has had several days of white foamy sputum. She denies any fever. She Is a move any sick exposure. She hasn't taken any over-the-counter medications. She states that she feels like her chest is, type in her voice is hoarse this is gotten worse in the last 48 hours. Patient's medications, allergies, past medical, surgical, social and family histories were reviewed and updated as appropriate.      Current Outpatient Prescriptions:     azithromycin (ZITHROMAX) 250 MG tablet, Take 2 tabs (500 mg) on Day 1, and take 1 tab (250 mg) on days 2 through 5., Disp: 1 packet, Rfl: 0    benzonatate (TESSALON) 200 MG capsule, Take 1 capsule by mouth 3 times daily as needed for Cough, Disp: 21 capsule, Rfl: 0    montelukast (SINGULAIR) 10 MG tablet, take 1 tablet by mouth at bedtime, Disp: 90 tablet, Rfl: 3    atenolol (TENORMIN) 50 MG tablet, take 1 tablet by mouth once daily, Disp: 90 tablet, Rfl: 3    escitalopram (LEXAPRO) 10 MG tablet, take 1 tablet by mouth once daily, Disp: 90 tablet, Rfl: 3    atorvastatin (LIPITOR) 20 MG tablet, Take 1 tablet by mouth daily, Disp: 90 tablet, Rfl: 3    levothyroxine (SYNTHROID) 25 MCG tablet, take 1 tablet by mouth once daily, Disp: 90 tablet, Rfl: 3    lisinopril (PRINIVIL;ZESTRIL) 5 MG tablet, take 1 tablet by mouth once daily, Disp: 90 tablet, Rfl: 3    alendronate (FOSAMAX) 70 MG tablet, take 1 tablet by mouth every week, Disp: 4 tablet, Rfl: 3    esomeprazole (NEXIUM) 40 MG delayed release capsule, take 1 capsule by mouth every morning BEFORE BREAKFAST, Disp: 90 capsule, Rfl: 3    aspirin 81 MG EC tablet, Take 1 tablet by mouth daily, Disp: 90 tablet, Rfl: 3    metFORMIN (GLUCOPHAGE) 500 MG tablet, Take 1 tablet by mouth three times daily, Disp: 270 tablet, Rfl: 3    empagliflozin (JARDIANCE) 10 MG tablet, Take 1 tablet by mouth daily, Disp: 90 tablet, Rfl: 3    fluticasone (FLONASE) 50 MCG/ACT nasal spray, instill 2 sprays into each nostril once daily. 90 day supply, Disp: 16 g, Rfl: 3    ergocalciferol (DRISDOL) 81272 UNITS capsule, Take 1 capsule by mouth once a week 90 day supply, Disp: 12 capsule, Rfl: 3    glucose blood VI test strips (ACCU-CHEK TAYLA PLUS) strip, use 1 three times a day, Disp: 100 strip, Rfl: 3    nitroGLYCERIN (NITROSTAT) 0.4 MG SL tablet, Place 1 tablet under the tongue as needed for Chest pain, Disp: 25 tablet, Rfl: 3    Accu-Chek Multiclix Lancets MISC, Finger stick tid, Disp: 100 each, Rfl: 3    Blood Glucose Monitoring Suppl (ACCU-CHEK TAYLA PLUS) W/DEVICE KIT, 1 kit by Does not apply route 3 times daily as needed Dx:250.00, Disp: 1 kit, Rfl: 0    fexofenadine (ALLEGRA) 180 MG tablet, Take 1 tablet by mouth daily, Disp: 90 tablet, Rfl: 3    calcium carbonate 600 MG TABS tablet, Take 1 tablet by mouth daily. , Disp: , Rfl:     meloxicam (MOBIC) 15 MG tablet, Take 1 tablet by mouth daily for 14 days, Disp: 14 tablet, Rfl: 0    Review of Systems   HENT: Positive for congestion. Respiratory: Positive for cough and chest tightness. OBJECTIVE:  /80 (Site: Left Arm, Position: Sitting, Cuff Size: Medium Adult)   Pulse 76   Temp 98 °F (36.7 °C) (Oral)   Resp 18   Wt 161 lb (73 kg)   SpO2 98%   BMI 29.45 kg/m²    Physical Exam   Constitutional: She is oriented to person, place, and time. She appears well-developed and well-nourished. HENT:   Head: Normocephalic and atraumatic. Right Ear: External ear normal.   Left Ear: External ear normal.   Nose: Rhinorrhea present. Eyes: Conjunctivae and EOM are normal. Pupils are equal, round, and reactive to light. Neck: Neck supple. No JVD present. No thyromegaly present. Cardiovascular: Normal rate, regular rhythm and normal heart sounds. Pulmonary/Chest: Effort normal. She has no wheezes. She has rales (left side).    Abdominal:

## 2017-12-05 ENCOUNTER — OFFICE VISIT (OUTPATIENT)
Dept: PRIMARY CARE CLINIC | Age: 72
End: 2017-12-05
Payer: MEDICARE

## 2017-12-05 VITALS
DIASTOLIC BLOOD PRESSURE: 78 MMHG | OXYGEN SATURATION: 97 % | RESPIRATION RATE: 16 BRPM | HEART RATE: 69 BPM | TEMPERATURE: 98.7 F | WEIGHT: 162.6 LBS | SYSTOLIC BLOOD PRESSURE: 138 MMHG | HEIGHT: 62 IN | BODY MASS INDEX: 29.92 KG/M2

## 2017-12-05 DIAGNOSIS — J18.9 PNEUMONIA OF LEFT LOWER LOBE DUE TO INFECTIOUS ORGANISM: Primary | ICD-10-CM

## 2017-12-05 PROCEDURE — G8427 DOCREV CUR MEDS BY ELIG CLIN: HCPCS | Performed by: PEDIATRICS

## 2017-12-05 PROCEDURE — 1123F ACP DISCUSS/DSCN MKR DOCD: CPT | Performed by: PEDIATRICS

## 2017-12-05 PROCEDURE — 99213 OFFICE O/P EST LOW 20 MIN: CPT | Performed by: PEDIATRICS

## 2017-12-05 PROCEDURE — 3014F SCREEN MAMMO DOC REV: CPT | Performed by: PEDIATRICS

## 2017-12-05 PROCEDURE — 1090F PRES/ABSN URINE INCON ASSESS: CPT | Performed by: PEDIATRICS

## 2017-12-05 PROCEDURE — G8484 FLU IMMUNIZE NO ADMIN: HCPCS | Performed by: PEDIATRICS

## 2017-12-05 PROCEDURE — 3017F COLORECTAL CA SCREEN DOC REV: CPT | Performed by: PEDIATRICS

## 2017-12-05 PROCEDURE — G8417 CALC BMI ABV UP PARAM F/U: HCPCS | Performed by: PEDIATRICS

## 2017-12-05 PROCEDURE — G8399 PT W/DXA RESULTS DOCUMENT: HCPCS | Performed by: PEDIATRICS

## 2017-12-05 PROCEDURE — 4040F PNEUMOC VAC/ADMIN/RCVD: CPT | Performed by: PEDIATRICS

## 2017-12-05 PROCEDURE — 1036F TOBACCO NON-USER: CPT | Performed by: PEDIATRICS

## 2017-12-05 RX ORDER — SULFAMETHOXAZOLE AND TRIMETHOPRIM 800; 160 MG/1; MG/1
1 TABLET ORAL 2 TIMES DAILY
Qty: 20 TABLET | Refills: 0 | Status: SHIPPED | OUTPATIENT
Start: 2017-12-05 | End: 2017-12-15

## 2017-12-05 ASSESSMENT — ENCOUNTER SYMPTOMS
VOMITING: 0
BACK PAIN: 0
COUGH: 1
SHORTNESS OF BREATH: 1
EYE DISCHARGE: 0
EYES NEGATIVE: 1
NAUSEA: 0
SORE THROAT: 0
GASTROINTESTINAL NEGATIVE: 1
WHEEZING: 0
SINUS PRESSURE: 0
ABDOMINAL PAIN: 0

## 2017-12-05 NOTE — PROGRESS NOTES
History:   Procedure Laterality Date    COLON SURGERY      POLYPS REMOVED    COLONOSCOPY  02/24/2017    Dr Cintia Whyte    POLYPECTOMY      SKIN CANCER EXCISION      RIGHT LEG     THYROIDECTOMY      WRIST SURGERY      LEFT       Family History   Problem Relation Age of Onset    Diabetes Mother     Heart Disease Mother      MI    Stroke Mother     Heart Disease Father      MI    Stroke Father     Diabetes Brother     Hypertension Brother     Diabetes Sister     Hypertension Sister        Social History     Social History    Marital status:      Spouse name: N/A    Number of children: N/A    Years of education: N/A     Social History Main Topics    Smoking status: Never Smoker    Smokeless tobacco: Never Used    Alcohol use No    Drug use: No    Sexual activity: Not Currently     Other Topics Concern    None     Social History Narrative    None       OBJECTIVE:    Vitals:    12/05/17 1516 12/05/17 1526   BP: (!) 152/82 138/78   Site: Left Arm    Position: Sitting    Pulse: 69    Resp: 16    Temp: 98.7 °F (37.1 °C)    TempSrc: Oral    SpO2: 97%    Weight: 162 lb 9.6 oz (73.8 kg)    Height: 5' 2\" (1.575 m)      Physical Exam   Constitutional: She is oriented to person, place, and time. She appears well-developed and well-nourished. No distress. HENT:   Head: Normocephalic and atraumatic. Nose: Nose normal.   Mouth/Throat: Oropharynx is clear and moist.   Eyes: Conjunctivae and EOM are normal. Pupils are equal, round, and reactive to light. Right eye exhibits no discharge. Left eye exhibits no discharge. No scleral icterus. Neck: Normal range of motion. Neck supple. No JVD present. No tracheal deviation present. No thyromegaly present. Cardiovascular: Normal rate, regular rhythm and normal heart sounds. No murmur heard. Pulmonary/Chest: Effort normal. No stridor. No respiratory distress. She has no wheezes. She has rales (rales in left lower lobe). She exhibits no tenderness. tablet 3    empagliflozin (JARDIANCE) 10 MG tablet Take 1 tablet by mouth daily 90 tablet 3    fluticasone (FLONASE) 50 MCG/ACT nasal spray instill 2 sprays into each nostril once daily. 90 day supply 16 g 3    ergocalciferol (DRISDOL) 51377 UNITS capsule Take 1 capsule by mouth once a week 90 day supply 12 capsule 3    glucose blood VI test strips (ACCU-CHEK TAYLA PLUS) strip use 1 three times a day 100 strip 3    nitroGLYCERIN (NITROSTAT) 0.4 MG SL tablet Place 1 tablet under the tongue as needed for Chest pain 25 tablet 3    Accu-Chek Multiclix Lancets MISC Finger stick tid 100 each 3    Blood Glucose Monitoring Suppl (ACCU-CHEK TAYLA PLUS) W/DEVICE KIT 1 kit by Does not apply route 3 times daily as needed Dx:250.00 1 kit 0    fexofenadine (ALLEGRA) 180 MG tablet Take 1 tablet by mouth daily 90 tablet 3    calcium carbonate 600 MG TABS tablet Take 1 tablet by mouth daily.  meloxicam (MOBIC) 15 MG tablet Take 1 tablet by mouth daily for 14 days 14 tablet 0     Current Facility-Administered Medications   Medication Dose Route Frequency Provider Last Rate Last Dose    cyanocobalamin injection 1,000 mcg  1,000 mcg Intramuscular Once [employer], DO            During this visit the following were done:  Labs reviewed []    Labs ordered []    Radiology reports Reviewed []    Radiology ordered []    EKG, echo, and/or stress test reviewed []    EEG results reviewed  []    EEG reviewed and interpreted per myself   []    Previous provider/old records requested  []    Previous provider Records Reviewed []    ER records Reviewed []    Hospital records Reviewed []    History obtained From Family []    Radiological images view and Interpreted per myself []      ASSESSMENT/PLAN:    Cuate Martínez was seen today for cough and nasal congestion.     Diagnoses and all orders for this visit:    Pneumonia of left lower lobe due to infectious organism ([facility] [address]  -     sulfamethoxazole-trimethoprim (BACTRIM DS) 800-160 MG per

## 2017-12-05 NOTE — PROGRESS NOTES
Patient ID: Natacha Schaefer is a 81 y.o. female. Chief Complaint   Patient presents with    Cough    Nasal Congestion       Have you seen any other physician or provider since your last visit no    Have you had any other diagnostic tests since your last visit? no    Have you changed or stopped any medications since your last visit? no     Review of Systems   Constitutional: Negative. HENT: Negative. Ear clogged   Eyes: Negative. Respiratory: Positive for cough and shortness of breath. Cardiovascular: Negative. Gastrointestinal: Negative. Genitourinary: Negative. Musculoskeletal: Negative. Skin: Negative. Neurological: Negative. Endo/Heme/Allergies: Negative. Psychiatric/Behavioral: Negative. Problems to be addressed this visit:    Patient still has some chest congestion and short of breath. She finished recent antibiotics over the weekend. Her coughing is better as long as she takes the Melinta. She states her left ear feels full and clogged.

## 2017-12-06 ENCOUNTER — HOSPITAL ENCOUNTER (OUTPATIENT)
Dept: OTHER | Age: 72
Discharge: HOME | End: 2017-12-06
Attending: PEDIATRICS | Admitting: PEDIATRICS

## 2017-12-06 DIAGNOSIS — J18.9 PNEUMONIA OF LEFT LOWER LOBE DUE TO INFECTIOUS ORGANISM: ICD-10-CM

## 2017-12-30 ASSESSMENT — ENCOUNTER SYMPTOMS: SHORTNESS OF BREATH: 1

## 2018-01-02 ENCOUNTER — NURSE ONLY (OUTPATIENT)
Dept: PRIMARY CARE CLINIC | Age: 73
End: 2018-01-02
Payer: MEDICARE

## 2018-01-02 DIAGNOSIS — E53.8 VITAMIN B12 DEFICIENCY: Primary | ICD-10-CM

## 2018-01-02 PROCEDURE — 96372 THER/PROPH/DIAG INJ SC/IM: CPT | Performed by: PEDIATRICS

## 2018-01-02 RX ORDER — CYANOCOBALAMIN 1000 UG/ML
1000 INJECTION INTRAMUSCULAR; SUBCUTANEOUS ONCE
Status: COMPLETED | OUTPATIENT
Start: 2018-01-02 | End: 2018-01-02

## 2018-01-02 RX ADMIN — CYANOCOBALAMIN 1000 MCG: 1000 INJECTION INTRAMUSCULAR; SUBCUTANEOUS at 11:11

## 2018-02-01 ENCOUNTER — NURSE ONLY (OUTPATIENT)
Dept: PRIMARY CARE CLINIC | Age: 73
End: 2018-02-01
Payer: MEDICARE

## 2018-02-01 DIAGNOSIS — E53.8 VITAMIN B12 DEFICIENCY: Primary | ICD-10-CM

## 2018-02-01 PROCEDURE — 96372 THER/PROPH/DIAG INJ SC/IM: CPT | Performed by: NURSE PRACTITIONER

## 2018-02-01 RX ORDER — CYANOCOBALAMIN 1000 UG/ML
1000 INJECTION INTRAMUSCULAR; SUBCUTANEOUS ONCE
Status: COMPLETED | OUTPATIENT
Start: 2018-02-01 | End: 2018-02-01

## 2018-02-01 RX ADMIN — CYANOCOBALAMIN 1000 MCG: 1000 INJECTION INTRAMUSCULAR; SUBCUTANEOUS at 09:57

## 2018-02-20 ENCOUNTER — OFFICE VISIT (OUTPATIENT)
Dept: PRIMARY CARE CLINIC | Age: 73
End: 2018-02-20
Payer: MEDICARE

## 2018-02-20 VITALS
DIASTOLIC BLOOD PRESSURE: 76 MMHG | RESPIRATION RATE: 20 BRPM | BODY MASS INDEX: 30.55 KG/M2 | HEIGHT: 62 IN | HEART RATE: 73 BPM | OXYGEN SATURATION: 96 % | WEIGHT: 166 LBS | SYSTOLIC BLOOD PRESSURE: 132 MMHG | TEMPERATURE: 98.3 F

## 2018-02-20 DIAGNOSIS — J30.9 CHRONIC ALLERGIC RHINITIS, UNSPECIFIED SEASONALITY, UNSPECIFIED TRIGGER: Primary | ICD-10-CM

## 2018-02-20 PROBLEM — J20.9 ACUTE BRONCHITIS: Status: RESOLVED | Noted: 2017-11-29 | Resolved: 2018-02-20

## 2018-02-20 PROBLEM — K52.9 GASTROENTERITIS: Status: RESOLVED | Noted: 2017-11-03 | Resolved: 2018-02-20

## 2018-02-20 PROCEDURE — G8484 FLU IMMUNIZE NO ADMIN: HCPCS | Performed by: FAMILY MEDICINE

## 2018-02-20 PROCEDURE — 1036F TOBACCO NON-USER: CPT | Performed by: FAMILY MEDICINE

## 2018-02-20 PROCEDURE — 99213 OFFICE O/P EST LOW 20 MIN: CPT | Performed by: FAMILY MEDICINE

## 2018-02-20 PROCEDURE — G8399 PT W/DXA RESULTS DOCUMENT: HCPCS | Performed by: FAMILY MEDICINE

## 2018-02-20 PROCEDURE — G8417 CALC BMI ABV UP PARAM F/U: HCPCS | Performed by: FAMILY MEDICINE

## 2018-02-20 PROCEDURE — G8427 DOCREV CUR MEDS BY ELIG CLIN: HCPCS | Performed by: FAMILY MEDICINE

## 2018-02-20 PROCEDURE — 1090F PRES/ABSN URINE INCON ASSESS: CPT | Performed by: FAMILY MEDICINE

## 2018-02-20 PROCEDURE — 1123F ACP DISCUSS/DSCN MKR DOCD: CPT | Performed by: FAMILY MEDICINE

## 2018-02-20 PROCEDURE — 4040F PNEUMOC VAC/ADMIN/RCVD: CPT | Performed by: FAMILY MEDICINE

## 2018-02-20 PROCEDURE — 3017F COLORECTAL CA SCREEN DOC REV: CPT | Performed by: FAMILY MEDICINE

## 2018-02-20 PROCEDURE — 3014F SCREEN MAMMO DOC REV: CPT | Performed by: FAMILY MEDICINE

## 2018-02-20 RX ORDER — FLUTICASONE PROPIONATE 50 MCG
SPRAY, SUSPENSION (ML) NASAL
Qty: 16 G | Refills: 3 | Status: SHIPPED | OUTPATIENT
Start: 2018-02-20 | End: 2018-03-05 | Stop reason: SDUPTHER

## 2018-02-20 ASSESSMENT — ENCOUNTER SYMPTOMS
EYE REDNESS: 0
VOMITING: 0
CONSTIPATION: 0
RHINORRHEA: 0
ABDOMINAL PAIN: 0
SHORTNESS OF BREATH: 0
SORE THROAT: 1
EYE ITCHING: 1
COUGH: 1
NAUSEA: 0
DIARRHEA: 0
EYE DISCHARGE: 1

## 2018-02-26 ENCOUNTER — OFFICE VISIT (OUTPATIENT)
Dept: PRIMARY CARE CLINIC | Age: 73
End: 2018-02-26
Payer: MEDICARE

## 2018-02-26 VITALS
DIASTOLIC BLOOD PRESSURE: 102 MMHG | BODY MASS INDEX: 30.18 KG/M2 | HEART RATE: 73 BPM | RESPIRATION RATE: 20 BRPM | SYSTOLIC BLOOD PRESSURE: 158 MMHG | OXYGEN SATURATION: 97 % | WEIGHT: 164 LBS | HEIGHT: 62 IN | TEMPERATURE: 97.6 F

## 2018-02-26 DIAGNOSIS — B96.89 ACUTE BACTERIAL SINUSITIS: Primary | ICD-10-CM

## 2018-02-26 DIAGNOSIS — J01.90 ACUTE BACTERIAL SINUSITIS: Primary | ICD-10-CM

## 2018-02-26 PROCEDURE — G8427 DOCREV CUR MEDS BY ELIG CLIN: HCPCS | Performed by: FAMILY MEDICINE

## 2018-02-26 PROCEDURE — 1123F ACP DISCUSS/DSCN MKR DOCD: CPT | Performed by: FAMILY MEDICINE

## 2018-02-26 PROCEDURE — G8484 FLU IMMUNIZE NO ADMIN: HCPCS | Performed by: FAMILY MEDICINE

## 2018-02-26 PROCEDURE — 1036F TOBACCO NON-USER: CPT | Performed by: FAMILY MEDICINE

## 2018-02-26 PROCEDURE — 1090F PRES/ABSN URINE INCON ASSESS: CPT | Performed by: FAMILY MEDICINE

## 2018-02-26 PROCEDURE — 4040F PNEUMOC VAC/ADMIN/RCVD: CPT | Performed by: FAMILY MEDICINE

## 2018-02-26 PROCEDURE — G8399 PT W/DXA RESULTS DOCUMENT: HCPCS | Performed by: FAMILY MEDICINE

## 2018-02-26 PROCEDURE — 3014F SCREEN MAMMO DOC REV: CPT | Performed by: FAMILY MEDICINE

## 2018-02-26 PROCEDURE — 99213 OFFICE O/P EST LOW 20 MIN: CPT | Performed by: FAMILY MEDICINE

## 2018-02-26 PROCEDURE — 3017F COLORECTAL CA SCREEN DOC REV: CPT | Performed by: FAMILY MEDICINE

## 2018-02-26 PROCEDURE — G8417 CALC BMI ABV UP PARAM F/U: HCPCS | Performed by: FAMILY MEDICINE

## 2018-02-26 RX ORDER — CEFDINIR 300 MG/1
300 CAPSULE ORAL 2 TIMES DAILY
Qty: 20 CAPSULE | Refills: 0 | Status: SHIPPED | OUTPATIENT
Start: 2018-02-26 | End: 2018-03-08

## 2018-02-26 NOTE — PROGRESS NOTES
1. Have you seen another provider since your last visit? No    2. Have you had any other diagnostic tests since your last visit? No    3. Have you changed or stopped any medications since your last visit?  No
external ear normal.   Left Ear: Tympanic membrane and external ear normal.   Mouth/Throat: Posterior oropharyngeal erythema (PND) present. Eyes: Conjunctivae and EOM are normal.   Neck: Neck supple. Cardiovascular: Normal rate, regular rhythm and normal heart sounds. Exam reveals no gallop and no friction rub. No murmur heard. Pulmonary/Chest: Effort normal and breath sounds normal. No respiratory distress. Abdominal: Soft. Bowel sounds are normal. She exhibits no distension. There is no tenderness. Lymphadenopathy:     She has no cervical adenopathy. Neurological: She is alert and oriented to person, place, and time. No cranial nerve deficit. Skin: Skin is warm and dry.        Results in Past 30 Days  Result Component Current Result Ref Range Previous Result Ref Range   Alb 4.4 (3/2/2018) 3.4 - 4.8 g/dL Not in Time Range    Albumin/Globulin Ratio 2.2 (H) (3/2/2018) 0.8 - 2.0 Not in Time Range    Alkaline Phosphatase 106 (H) (3/2/2018) 25 - 100 U/L Not in Time Range    ALT 15 (3/2/2018) 4 - 36 U/L Not in Time Range    AST 14 (3/2/2018) 8 - 33 U/L Not in Time Range    BUN 18 (3/2/2018) 6 - 20 mg/dL Not in Time Range    Calcium 8.8 (3/2/2018) 8.5 - 10.5 mg/dL Not in Time Range    Chloride 103 (3/2/2018) 98 - 107 mmol/L Not in Time Range    CO2 28 (3/2/2018) 20 - 30 mmol/L Not in Time Range    CREATININE 0.8 (3/2/2018) 0.4 - 1.2 mg/dL Not in Time Range    GFR  >59 (3/2/2018) >59 Not in Time Range    GFR Non- >60 (3/2/2018) >59 Not in Time Range    Globulin 2.0 (3/2/2018) g/dL Not in Time Range    Glucose 113 (H) (3/2/2018) 74 - 106 mg/dL Not in Time Range    Potassium 4.4 (3/2/2018) 3.4 - 5.1 mmol/L Not in Time Range    Sodium 142 (3/2/2018) 136 - 145 mmol/L Not in Time Range    Total Bilirubin 0.4 (3/2/2018) 0.3 - 1.2 mg/dL Not in Time Range    Total Protein 6.4 (3/2/2018) 6.4 - 8.3 g/dL Not in Time Range        Hemoglobin A1C (%)   Date Value   03/02/2018 6.6 (H)

## 2018-02-26 NOTE — PATIENT INSTRUCTIONS
· Delsym is the best cough syrup. Robitussin is good as well. · Keep a list of your medicines with you. List all of the prescription medicines, nonprescription medicines, supplements, natural remedies, and vitamins that you take. Tell your healthcare providers who treat you about all of the products you are taking. Your provider can provide you with a form to keep track of them. Just ask. · Follow the directions that come with your medicine, including information about food or alcohol. Make sure you know how and when to take your medicine. Do not take more or less than you are supposed to take. · Keep all medicines out of the reach of children. · Store medicines according to the directions on the label. · Monitor yourself. Learn to know how your body reacts to your new medicine and keep track of how it makes you feel before attempting (If your provider has allowed you to do so) to drive or go to work. · Seek emergency medical attention if you think you have used too much of this medicine. An overdose of any prescription medicine can be fatal. Overdose symptoms may include extreme drowsiness, muscle weakness, confusion, cold and clammy skin, pinpoint pupils, shallow breathing, slow heart rate, fainting, or coma. · Don't share prescription medicines with others, even when they seem to have the same symptoms. What may be good for you may be harmful to others. · If you are no longer taking a prescribed medication and you have pills left please take your pills out of their original containers. Mix crushed pills with an undesirable substance, such as cat litter or used coffee grounds. Put the mixture into a disposable container with a lid, such as an empty margarine tub, or into a sealable bag. Cover up or remove any of your personal information on the empty containers by covering it with black permanent marker or duct tape.   Place the sealed container with the mixture, and the empty drug containers, in the

## 2018-03-02 ENCOUNTER — HOSPITAL ENCOUNTER (OUTPATIENT)
Dept: OTHER | Age: 73
Discharge: HOME | End: 2018-03-02
Attending: NURSE PRACTITIONER | Admitting: NURSE PRACTITIONER

## 2018-03-02 ENCOUNTER — NURSE ONLY (OUTPATIENT)
Dept: PRIMARY CARE CLINIC | Age: 73
End: 2018-03-02
Payer: MEDICARE

## 2018-03-02 DIAGNOSIS — E53.8 B12 DEFICIENCY: Primary | ICD-10-CM

## 2018-03-02 DIAGNOSIS — E03.9 ACQUIRED HYPOTHYROIDISM: ICD-10-CM

## 2018-03-02 DIAGNOSIS — E78.00 PURE HYPERCHOLESTEROLEMIA: ICD-10-CM

## 2018-03-02 DIAGNOSIS — E55.9 VITAMIN D DEFICIENCY: ICD-10-CM

## 2018-03-02 DIAGNOSIS — E11.9 TYPE 2 DIABETES MELLITUS WITHOUT COMPLICATION, WITHOUT LONG-TERM CURRENT USE OF INSULIN (HCC): ICD-10-CM

## 2018-03-02 LAB
A/G RATIO: 2.2 (ref 0.8–2)
ALBUMIN SERPL-MCNC: 4.4 G/DL (ref 3.4–4.8)
ALP BLD-CCNC: 106 U/L (ref 25–100)
ALT SERPL-CCNC: 15 U/L (ref 4–36)
ANION GAP SERPL CALCULATED.3IONS-SCNC: 11 MMOL/L (ref 3–16)
AST SERPL-CCNC: 14 U/L (ref 8–33)
BILIRUB SERPL-MCNC: 0.4 MG/DL (ref 0.3–1.2)
BUN BLDV-MCNC: 18 MG/DL (ref 6–20)
CALCIUM SERPL-MCNC: 8.8 MG/DL (ref 8.5–10.5)
CHLORIDE BLD-SCNC: 103 MMOL/L (ref 98–107)
CHOLESTEROL, TOTAL: 184 MG/DL (ref 0–200)
CO2: 28 MMOL/L (ref 20–30)
CREAT SERPL-MCNC: 0.8 MG/DL (ref 0.4–1.2)
GFR AFRICAN AMERICAN: >59
GFR NON-AFRICAN AMERICAN: >60
GLOBULIN: 2 G/DL
GLUCOSE BLD-MCNC: 113 MG/DL (ref 74–106)
HBA1C MFR BLD: 6.6 %
HCT VFR BLD CALC: 40 % (ref 37–47)
HDLC SERPL-MCNC: 47 MG/DL (ref 40–60)
HEMOGLOBIN: 12.4 G/DL (ref 11.5–16.5)
LDL CHOLESTEROL CALCULATED: 99 MG/DL
MCH RBC QN AUTO: 29.1 PG (ref 27–32)
MCHC RBC AUTO-ENTMCNC: 31 G/DL (ref 31–35)
MCV RBC AUTO: 93.9 FL (ref 80–100)
PDW BLD-RTO: 13 % (ref 11–16)
PLATELET # BLD: 235 K/UL (ref 150–400)
PMV BLD AUTO: 11.6 FL (ref 6–10)
POTASSIUM SERPL-SCNC: 4.4 MMOL/L (ref 3.4–5.1)
RBC # BLD: 4.26 M/UL (ref 3.8–5.8)
SODIUM BLD-SCNC: 142 MMOL/L (ref 136–145)
TOTAL PROTEIN: 6.4 G/DL (ref 6.4–8.3)
TRIGL SERPL-MCNC: 191 MG/DL (ref 0–249)
TSH SERPL DL<=0.05 MIU/L-ACNC: 4.94 UIU/ML (ref 0.35–5.5)
VITAMIN D 25-HYDROXY: 33.7 NG/ML
VLDLC SERPL CALC-MCNC: 38 MG/DL
WBC # BLD: 6.5 K/UL (ref 4–11)

## 2018-03-02 PROCEDURE — 96372 THER/PROPH/DIAG INJ SC/IM: CPT | Performed by: FAMILY MEDICINE

## 2018-03-02 RX ORDER — CYANOCOBALAMIN 1000 UG/ML
1000 INJECTION INTRAMUSCULAR; SUBCUTANEOUS ONCE
Status: COMPLETED | OUTPATIENT
Start: 2018-03-02 | End: 2018-03-02

## 2018-03-02 RX ADMIN — CYANOCOBALAMIN 1000 MCG: 1000 INJECTION INTRAMUSCULAR; SUBCUTANEOUS at 10:09

## 2018-03-04 PROBLEM — J01.90 ACUTE BACTERIAL SINUSITIS: Status: ACTIVE | Noted: 2018-03-04

## 2018-03-04 PROBLEM — B96.89 ACUTE BACTERIAL SINUSITIS: Status: ACTIVE | Noted: 2018-03-04

## 2018-03-04 ASSESSMENT — ENCOUNTER SYMPTOMS
CONSTIPATION: 0
SHORTNESS OF BREATH: 0
SINUS PRESSURE: 1
ABDOMINAL PAIN: 0
DIARRHEA: 0
NAUSEA: 0
VOMITING: 0
RHINORRHEA: 0
SORE THROAT: 1
COUGH: 1

## 2018-03-05 ENCOUNTER — OFFICE VISIT (OUTPATIENT)
Dept: PRIMARY CARE CLINIC | Age: 73
End: 2018-03-05
Payer: MEDICARE

## 2018-03-05 VITALS
BODY MASS INDEX: 30.77 KG/M2 | OXYGEN SATURATION: 97 % | WEIGHT: 167.2 LBS | HEART RATE: 74 BPM | RESPIRATION RATE: 16 BRPM | TEMPERATURE: 98.2 F | HEIGHT: 62 IN | DIASTOLIC BLOOD PRESSURE: 80 MMHG | SYSTOLIC BLOOD PRESSURE: 132 MMHG

## 2018-03-05 DIAGNOSIS — E11.9 TYPE 2 DIABETES MELLITUS WITHOUT COMPLICATION, WITHOUT LONG-TERM CURRENT USE OF INSULIN (HCC): Primary | ICD-10-CM

## 2018-03-05 DIAGNOSIS — F32.A DEPRESSION, UNSPECIFIED DEPRESSION TYPE: ICD-10-CM

## 2018-03-05 DIAGNOSIS — E78.00 PURE HYPERCHOLESTEROLEMIA: ICD-10-CM

## 2018-03-05 DIAGNOSIS — J01.90 ACUTE BACTERIAL SINUSITIS: ICD-10-CM

## 2018-03-05 DIAGNOSIS — E55.9 VITAMIN D DEFICIENCY: ICD-10-CM

## 2018-03-05 DIAGNOSIS — B96.89 ACUTE BACTERIAL SINUSITIS: ICD-10-CM

## 2018-03-05 DIAGNOSIS — E03.9 ACQUIRED HYPOTHYROIDISM: ICD-10-CM

## 2018-03-05 DIAGNOSIS — I10 ESSENTIAL HYPERTENSION: ICD-10-CM

## 2018-03-05 PROCEDURE — G8482 FLU IMMUNIZE ORDER/ADMIN: HCPCS | Performed by: NURSE PRACTITIONER

## 2018-03-05 PROCEDURE — G8427 DOCREV CUR MEDS BY ELIG CLIN: HCPCS | Performed by: NURSE PRACTITIONER

## 2018-03-05 PROCEDURE — 1123F ACP DISCUSS/DSCN MKR DOCD: CPT | Performed by: NURSE PRACTITIONER

## 2018-03-05 PROCEDURE — 3014F SCREEN MAMMO DOC REV: CPT | Performed by: NURSE PRACTITIONER

## 2018-03-05 PROCEDURE — G8417 CALC BMI ABV UP PARAM F/U: HCPCS | Performed by: NURSE PRACTITIONER

## 2018-03-05 PROCEDURE — 3044F HG A1C LEVEL LT 7.0%: CPT | Performed by: NURSE PRACTITIONER

## 2018-03-05 PROCEDURE — 1036F TOBACCO NON-USER: CPT | Performed by: NURSE PRACTITIONER

## 2018-03-05 PROCEDURE — 99214 OFFICE O/P EST MOD 30 MIN: CPT | Performed by: NURSE PRACTITIONER

## 2018-03-05 PROCEDURE — 4040F PNEUMOC VAC/ADMIN/RCVD: CPT | Performed by: NURSE PRACTITIONER

## 2018-03-05 PROCEDURE — 3017F COLORECTAL CA SCREEN DOC REV: CPT | Performed by: NURSE PRACTITIONER

## 2018-03-05 PROCEDURE — 1090F PRES/ABSN URINE INCON ASSESS: CPT | Performed by: NURSE PRACTITIONER

## 2018-03-05 PROCEDURE — G8399 PT W/DXA RESULTS DOCUMENT: HCPCS | Performed by: NURSE PRACTITIONER

## 2018-03-05 RX ORDER — ATENOLOL 50 MG/1
TABLET ORAL
Qty: 90 TABLET | Refills: 3 | Status: SHIPPED | OUTPATIENT
Start: 2018-03-05 | End: 2018-07-27 | Stop reason: SDUPTHER

## 2018-03-05 RX ORDER — LISINOPRIL 5 MG/1
TABLET ORAL
Qty: 90 TABLET | Refills: 3 | Status: SHIPPED | OUTPATIENT
Start: 2018-03-05 | End: 2018-11-20 | Stop reason: DRUGHIGH

## 2018-03-05 RX ORDER — ALENDRONATE SODIUM 70 MG/1
TABLET ORAL
Qty: 4 TABLET | Refills: 3 | Status: SHIPPED | OUTPATIENT
Start: 2018-03-05 | End: 2018-07-27 | Stop reason: SDUPTHER

## 2018-03-05 RX ORDER — FLUTICASONE PROPIONATE 50 MCG
SPRAY, SUSPENSION (ML) NASAL
Qty: 16 G | Refills: 3 | Status: SHIPPED | OUTPATIENT
Start: 2018-03-05 | End: 2018-07-27 | Stop reason: SDUPTHER

## 2018-03-05 RX ORDER — ESCITALOPRAM OXALATE 10 MG/1
TABLET ORAL
Qty: 90 TABLET | Refills: 3 | Status: SHIPPED | OUTPATIENT
Start: 2018-03-05 | End: 2018-07-27 | Stop reason: SDUPTHER

## 2018-03-05 RX ORDER — ESOMEPRAZOLE MAGNESIUM 40 MG/1
CAPSULE, DELAYED RELEASE ORAL
Qty: 90 CAPSULE | Refills: 3 | Status: SHIPPED | OUTPATIENT
Start: 2018-03-05 | End: 2018-07-27 | Stop reason: SDUPTHER

## 2018-03-05 RX ORDER — ASPIRIN 81 MG/1
81 TABLET ORAL DAILY
Qty: 90 TABLET | Refills: 3 | Status: SHIPPED | OUTPATIENT
Start: 2018-03-05 | End: 2018-03-08 | Stop reason: SDUPTHER

## 2018-03-05 RX ORDER — LANCETS
EACH MISCELLANEOUS
Qty: 100 EACH | Refills: 3 | Status: SHIPPED | OUTPATIENT
Start: 2018-03-05 | End: 2019-07-19 | Stop reason: SDUPTHER

## 2018-03-05 RX ORDER — METHYLPREDNISOLONE 4 MG/1
TABLET ORAL
Qty: 21 TABLET | Refills: 0 | Status: SHIPPED | OUTPATIENT
Start: 2018-03-05 | End: 2018-03-11

## 2018-03-05 RX ORDER — ERGOCALCIFEROL (VITAMIN D2) 1250 MCG
50000 CAPSULE ORAL WEEKLY
Qty: 12 CAPSULE | Refills: 3 | Status: SHIPPED | OUTPATIENT
Start: 2018-03-05 | End: 2018-07-27 | Stop reason: SDUPTHER

## 2018-03-05 RX ORDER — ATORVASTATIN CALCIUM 20 MG/1
20 TABLET, FILM COATED ORAL DAILY
Qty: 90 TABLET | Refills: 3 | Status: SHIPPED | OUTPATIENT
Start: 2018-03-05 | End: 2018-07-27 | Stop reason: SDUPTHER

## 2018-03-05 RX ORDER — LEVOTHYROXINE SODIUM 0.03 MG/1
TABLET ORAL
Qty: 90 TABLET | Refills: 3 | Status: SHIPPED | OUTPATIENT
Start: 2018-03-05 | End: 2018-07-27 | Stop reason: SDUPTHER

## 2018-03-05 RX ORDER — MONTELUKAST SODIUM 10 MG/1
TABLET ORAL
Qty: 90 TABLET | Refills: 3 | Status: SHIPPED | OUTPATIENT
Start: 2018-03-05 | End: 2018-07-27 | Stop reason: SDUPTHER

## 2018-03-05 ASSESSMENT — PATIENT HEALTH QUESTIONNAIRE - PHQ9
2. FEELING DOWN, DEPRESSED OR HOPELESS: 0
SUM OF ALL RESPONSES TO PHQ9 QUESTIONS 1 & 2: 0
1. LITTLE INTEREST OR PLEASURE IN DOING THINGS: 0
SUM OF ALL RESPONSES TO PHQ QUESTIONS 1-9: 0

## 2018-03-05 ASSESSMENT — ENCOUNTER SYMPTOMS
RESPIRATORY NEGATIVE: 1
GASTROINTESTINAL NEGATIVE: 1

## 2018-03-05 NOTE — PROGRESS NOTES
Chief Complaint   Patient presents with    Diabetes    Hypertension    Hyperlipidemia         Have you seen any other physician or provider since your last visit yes - Dr Jeff Jones    Have you had any other diagnostic tests since your last visit? yes - labs    Have you changed or stopped any medications since your last visit? Yes started Cefdinir     Patient is here for a 3 month follow up on labs for hypertension,hyperlipidemia, and diabetes. She checks her sugar 2-3 times daily. Her numbers have been  but has had some episodes of 60. She was seen by Dr Jeff Jones for cough and congestion. She is taking Cefdinir and still has some drainage and non productive cough.

## 2018-03-05 NOTE — PROGRESS NOTES
SUBJECTIVE:    Patient ID: Saul Boland is a 72 y.o. female. Chief Complaint   Patient presents with    Diabetes    Hypertension    Hyperlipidemia         HPI:  She returns about her diabetes, htn, and cholesterol. She is still having cough and congestion for several weeks. She has been taking Omnicef. She says she is not a lot better. She is still taking the antibiotic. She is following on her diabetes and high cholesterol and hypertension. She denies any problems with this and she is taking her medications without any problems. Patient's medications, allergies, past medical, surgical, social and family histories were reviewed and updated as appropriate. Current Outpatient Prescriptions:     aspirin 81 MG EC tablet, Take 1 tablet by mouth daily, Disp: 90 tablet, Rfl: 3    ACCU-CHEK MULTICLIX LANCETS MISC, Finger stick tid, Disp: 100 each, Rfl: 3    alendronate (FOSAMAX) 70 MG tablet, take 1 tablet by mouth every week, Disp: 4 tablet, Rfl: 3    atenolol (TENORMIN) 50 MG tablet, take 1 tablet by mouth once daily, Disp: 90 tablet, Rfl: 3    atorvastatin (LIPITOR) 20 MG tablet, Take 1 tablet by mouth daily, Disp: 90 tablet, Rfl: 3    empagliflozin (JARDIANCE) 10 MG tablet, Take 1 tablet by mouth daily, Disp: 90 tablet, Rfl: 3    ergocalciferol (DRISDOL) 67651 units capsule, Take 1 capsule by mouth once a week 90 day supply, Disp: 12 capsule, Rfl: 3    escitalopram (LEXAPRO) 10 MG tablet, take 1 tablet by mouth once daily, Disp: 90 tablet, Rfl: 3    esomeprazole (NEXIUM) 40 MG delayed release capsule, take 1 capsule by mouth every morning BEFORE BREAKFAST, Disp: 90 capsule, Rfl: 3    fluticasone (FLONASE) 50 MCG/ACT nasal spray, instill 2 sprays into each nostril once daily.  90 day supply, Disp: 16 g, Rfl: 3    glucose blood VI test strips (ACCU-CHEK TAYLA PLUS) strip, use 1 three times a day, Disp: 100 strip, Rfl: 3    levothyroxine (SYNTHROID) 25 MCG tablet, take 1 tablet by mouth once daily, Disp: 90 tablet, Rfl: 3    lisinopril (PRINIVIL;ZESTRIL) 5 MG tablet, take 1 tablet by mouth once daily, Disp: 90 tablet, Rfl: 3    metFORMIN (GLUCOPHAGE) 500 MG tablet, Take 1 tablet by mouth three times daily, Disp: 270 tablet, Rfl: 3    montelukast (SINGULAIR) 10 MG tablet, take 1 tablet by mouth at bedtime, Disp: 90 tablet, Rfl: 3    nitroGLYCERIN (NITROSTAT) 0.4 MG SL tablet, Place 1 tablet under the tongue as needed for Chest pain, Disp: 25 tablet, Rfl: 3    Blood Glucose Monitoring Suppl (ACCU-CHEK TAYLA PLUS) W/DEVICE KIT, 1 kit by Does not apply route 3 times daily as needed Dx:250.00, Disp: 1 kit, Rfl: 0    fexofenadine (ALLEGRA) 180 MG tablet, Take 1 tablet by mouth daily, Disp: 90 tablet, Rfl: 3    calcium carbonate 600 MG TABS tablet, Take 1 tablet by mouth daily. , Disp: , Rfl:     Review of Systems   Constitutional: Negative. HENT: Positive for congestion, postnasal drip and rhinorrhea. Respiratory: Negative. Cardiovascular: Negative. Gastrointestinal: Negative. Genitourinary: Negative. Musculoskeletal: Negative. Skin: Negative. Neurological: Negative. Psychiatric/Behavioral: Negative. OBJECTIVE:  /80 (Site: Right Arm, Position: Sitting, Cuff Size: Medium Adult)   Pulse 74   Temp 98.2 °F (36.8 °C) (Oral)   Resp 16   Ht 5' 2\" (1.575 m)   Wt 167 lb 3.2 oz (75.8 kg)   SpO2 97% Comment: room air  BMI 30.58 kg/m²    Physical Exam   Constitutional: She is oriented to person, place, and time. She appears well-developed and well-nourished. HENT:   Head: Normocephalic and atraumatic. Right Ear: External ear normal.   Left Ear: External ear normal.   Nose: Rhinorrhea present. Right sinus exhibits maxillary sinus tenderness. Left sinus exhibits maxillary sinus tenderness. Eyes: Conjunctivae and EOM are normal. Pupils are equal, round, and reactive to light. Neck: Neck supple. No JVD present. No thyromegaly present.    Cardiovascular: Normal rate, 3.4 04/07/2015    HGB 12.4 03/02/2018    HCT 40.0 03/02/2018    MCV 93.9 03/02/2018     03/02/2018       Lab Results   Component Value Date    TSH 4.94 03/02/2018         ASSESSMENT/PLAN:     1. Type 2 diabetes mellitus without complication, without long-term current use of insulin (HCC)  Stable. I advised her regarding diabetic diet, exercise and weight control. Also, I advised her to stay on the current medication, monitor her fingerstick closely. I am going to check the A1c every few months. I will check microalbumin on a yearly basis. I have also advised her to have a yearly eye exam and to monitor her feet closely. Along with instruction of possible complications related to diabetes, even with good control. A1C will be checked  in few months. Lab Results   Component Value Date    LABA1C 6.6 (H) 03/02/2018          - CBC; Future  - Comprehensive Metabolic Panel; Future  - Hemoglobin A1C; Future  - empagliflozin (JARDIANCE) 10 MG tablet; Take 1 tablet by mouth daily  Dispense: 90 tablet; Refill: 3  - metFORMIN (GLUCOPHAGE) 500 MG tablet; Take 1 tablet by mouth three times daily  Dispense: 270 tablet; Refill: 3    2. Acquired hypothyroidism  Stable. I am going to check the TSH every few months. I am going to continue the current dose of Levothyroxine. I have advised her on the importance of taking the medication on a daily basis. Lab Results   Component Value Date    TSH 4.94 03/02/2018       - TSH without Reflex; Future  - levothyroxine (SYNTHROID) 25 MCG tablet; take 1 tablet by mouth once daily  Dispense: 90 tablet; Refill: 3    3. Acute bacterial sinusitis    - methylPREDNISolone (MEDROL, RONALD,) 4 MG tablet; Take by mouth. Dispense: 21 tablet; Refill: 0    4. Pure hypercholesterolemia    - atenolol (TENORMIN) 50 MG tablet; take 1 tablet by mouth once daily  Dispense: 90 tablet; Refill: 3  - atorvastatin (LIPITOR) 20 MG tablet; Take 1 tablet by mouth daily  Dispense: 90 tablet;  Refill: 3    5. Vitamin D deficiency    - ergocalciferol (DRISDOL) 35254 units capsule; Take 1 capsule by mouth once a week 90 day supply  Dispense: 12 capsule; Refill: 3    6. Depression, unspecified depression type    - escitalopram (LEXAPRO) 10 MG tablet; take 1 tablet by mouth once daily  Dispense: 90 tablet; Refill: 3    7. Essential hypertension    - lisinopril (PRINIVIL;ZESTRIL) 5 MG tablet; take 1 tablet by mouth once daily  Dispense: 90 tablet;  Refill: 3

## 2018-03-08 RX ORDER — ASPIRIN 81 MG/1
81 TABLET ORAL DAILY
Qty: 90 TABLET | Refills: 3 | Status: SHIPPED | OUTPATIENT
Start: 2018-03-08 | End: 2019-04-01 | Stop reason: SDUPTHER

## 2018-03-18 ASSESSMENT — ENCOUNTER SYMPTOMS: RHINORRHEA: 1

## 2018-04-02 ENCOUNTER — NURSE ONLY (OUTPATIENT)
Dept: PRIMARY CARE CLINIC | Age: 73
End: 2018-04-02
Payer: MEDICARE

## 2018-04-02 DIAGNOSIS — E53.8 B12 DEFICIENCY: Primary | ICD-10-CM

## 2018-04-02 PROCEDURE — 96372 THER/PROPH/DIAG INJ SC/IM: CPT | Performed by: NURSE PRACTITIONER

## 2018-04-02 RX ORDER — CYANOCOBALAMIN 1000 UG/ML
1000 INJECTION INTRAMUSCULAR; SUBCUTANEOUS ONCE
Status: COMPLETED | OUTPATIENT
Start: 2018-04-02 | End: 2018-04-02

## 2018-04-02 RX ADMIN — CYANOCOBALAMIN 1000 MCG: 1000 INJECTION INTRAMUSCULAR; SUBCUTANEOUS at 11:03

## 2018-04-12 ENCOUNTER — OFFICE VISIT (OUTPATIENT)
Dept: PRIMARY CARE CLINIC | Age: 73
End: 2018-04-12
Payer: MEDICARE

## 2018-04-12 VITALS
RESPIRATION RATE: 16 BRPM | SYSTOLIC BLOOD PRESSURE: 164 MMHG | HEIGHT: 62 IN | BODY MASS INDEX: 30.18 KG/M2 | WEIGHT: 164 LBS | HEART RATE: 72 BPM | OXYGEN SATURATION: 97 % | DIASTOLIC BLOOD PRESSURE: 82 MMHG | TEMPERATURE: 97.5 F

## 2018-04-12 DIAGNOSIS — B37.9 YEAST INFECTION: ICD-10-CM

## 2018-04-12 DIAGNOSIS — J01.00 ACUTE NON-RECURRENT MAXILLARY SINUSITIS: Primary | ICD-10-CM

## 2018-04-12 DIAGNOSIS — H10.13 ALLERGIC CONJUNCTIVITIS OF BOTH EYES: ICD-10-CM

## 2018-04-12 DIAGNOSIS — R30.0 DYSURIA: ICD-10-CM

## 2018-04-12 LAB
BILIRUBIN, POC: NORMAL
BLOOD URINE, POC: NORMAL
CLARITY, POC: CLEAR
COLOR, POC: CLEAR
GLUCOSE URINE, POC: NORMAL
KETONES, POC: NORMAL
LEUKOCYTE EST, POC: NORMAL
NITRITE, POC: NORMAL
PH, POC: 7
PROTEIN, POC: NORMAL
SPECIFIC GRAVITY, POC: 1.01
UROBILINOGEN, POC: 0.2

## 2018-04-12 PROCEDURE — 1036F TOBACCO NON-USER: CPT | Performed by: NURSE PRACTITIONER

## 2018-04-12 PROCEDURE — 3017F COLORECTAL CA SCREEN DOC REV: CPT | Performed by: NURSE PRACTITIONER

## 2018-04-12 PROCEDURE — G8417 CALC BMI ABV UP PARAM F/U: HCPCS | Performed by: NURSE PRACTITIONER

## 2018-04-12 PROCEDURE — 1090F PRES/ABSN URINE INCON ASSESS: CPT | Performed by: NURSE PRACTITIONER

## 2018-04-12 PROCEDURE — 81002 URINALYSIS NONAUTO W/O SCOPE: CPT | Performed by: NURSE PRACTITIONER

## 2018-04-12 PROCEDURE — G8427 DOCREV CUR MEDS BY ELIG CLIN: HCPCS | Performed by: NURSE PRACTITIONER

## 2018-04-12 PROCEDURE — 1123F ACP DISCUSS/DSCN MKR DOCD: CPT | Performed by: NURSE PRACTITIONER

## 2018-04-12 PROCEDURE — G8399 PT W/DXA RESULTS DOCUMENT: HCPCS | Performed by: NURSE PRACTITIONER

## 2018-04-12 PROCEDURE — 99213 OFFICE O/P EST LOW 20 MIN: CPT | Performed by: NURSE PRACTITIONER

## 2018-04-12 PROCEDURE — 4040F PNEUMOC VAC/ADMIN/RCVD: CPT | Performed by: NURSE PRACTITIONER

## 2018-04-12 RX ORDER — FLUCONAZOLE 100 MG/1
100 TABLET ORAL DAILY
Qty: 5 TABLET | Refills: 0 | Status: SHIPPED | OUTPATIENT
Start: 2018-04-12 | End: 2018-04-17

## 2018-04-12 RX ORDER — OLOPATADINE HYDROCHLORIDE 1 MG/ML
1 SOLUTION/ DROPS OPHTHALMIC 2 TIMES DAILY
Qty: 15 ML | Refills: 3 | Status: SHIPPED | OUTPATIENT
Start: 2018-04-12 | End: 2018-05-12

## 2018-04-12 RX ORDER — LEVOFLOXACIN 500 MG/1
500 TABLET, FILM COATED ORAL DAILY
Qty: 10 TABLET | Refills: 0 | Status: SHIPPED | OUTPATIENT
Start: 2018-04-12 | End: 2018-04-22

## 2018-04-12 ASSESSMENT — ENCOUNTER SYMPTOMS
GASTROINTESTINAL NEGATIVE: 1
RESPIRATORY NEGATIVE: 1

## 2018-04-27 ENCOUNTER — NURSE ONLY (OUTPATIENT)
Dept: PRIMARY CARE CLINIC | Age: 73
End: 2018-04-27
Payer: MEDICARE

## 2018-04-27 DIAGNOSIS — E53.8 VITAMIN B12 DEFICIENCY: Primary | ICD-10-CM

## 2018-04-27 PROCEDURE — 96372 THER/PROPH/DIAG INJ SC/IM: CPT | Performed by: FAMILY MEDICINE

## 2018-04-27 RX ORDER — CYANOCOBALAMIN 1000 UG/ML
1000 INJECTION INTRAMUSCULAR; SUBCUTANEOUS ONCE
Status: COMPLETED | OUTPATIENT
Start: 2018-04-27 | End: 2018-04-27

## 2018-04-27 RX ADMIN — CYANOCOBALAMIN 1000 MCG: 1000 INJECTION INTRAMUSCULAR; SUBCUTANEOUS at 09:06

## 2018-04-30 ASSESSMENT — ENCOUNTER SYMPTOMS: RHINORRHEA: 1

## 2018-06-01 ENCOUNTER — NURSE ONLY (OUTPATIENT)
Dept: PRIMARY CARE CLINIC | Age: 73
End: 2018-06-01
Payer: MEDICARE

## 2018-06-01 DIAGNOSIS — E53.8 B12 DEFICIENCY: Primary | ICD-10-CM

## 2018-06-01 PROCEDURE — 96372 THER/PROPH/DIAG INJ SC/IM: CPT | Performed by: NURSE PRACTITIONER

## 2018-06-01 RX ORDER — CYANOCOBALAMIN 1000 UG/ML
1000 INJECTION INTRAMUSCULAR; SUBCUTANEOUS ONCE
Status: COMPLETED | OUTPATIENT
Start: 2018-06-01 | End: 2018-06-01

## 2018-06-01 RX ADMIN — CYANOCOBALAMIN 1000 MCG: 1000 INJECTION INTRAMUSCULAR; SUBCUTANEOUS at 11:00

## 2018-06-29 ENCOUNTER — NURSE ONLY (OUTPATIENT)
Dept: PRIMARY CARE CLINIC | Age: 73
End: 2018-06-29
Payer: MEDICARE

## 2018-06-29 DIAGNOSIS — E53.8 B12 DEFICIENCY: Primary | ICD-10-CM

## 2018-06-29 PROCEDURE — 96372 THER/PROPH/DIAG INJ SC/IM: CPT | Performed by: NURSE PRACTITIONER

## 2018-06-29 RX ORDER — CYANOCOBALAMIN 1000 UG/ML
1000 INJECTION INTRAMUSCULAR; SUBCUTANEOUS ONCE
Status: COMPLETED | OUTPATIENT
Start: 2018-06-29 | End: 2018-06-29

## 2018-06-29 RX ADMIN — CYANOCOBALAMIN 1000 MCG: 1000 INJECTION INTRAMUSCULAR; SUBCUTANEOUS at 11:41

## 2018-07-23 ENCOUNTER — NURSE ONLY (OUTPATIENT)
Dept: PRIMARY CARE CLINIC | Age: 73
End: 2018-07-23
Payer: MEDICARE

## 2018-07-23 ENCOUNTER — HOSPITAL ENCOUNTER (OUTPATIENT)
Facility: HOSPITAL | Age: 73
Discharge: HOME | End: 2018-07-23
Payer: MEDICARE

## 2018-07-23 DIAGNOSIS — E11.9 TYPE 2 DIABETES MELLITUS WITHOUT COMPLICATION, WITHOUT LONG-TERM CURRENT USE OF INSULIN (HCC): ICD-10-CM

## 2018-07-23 DIAGNOSIS — E03.9 ACQUIRED HYPOTHYROIDISM: ICD-10-CM

## 2018-07-23 DIAGNOSIS — E53.8 VITAMIN B12 DEFICIENCY: Primary | ICD-10-CM

## 2018-07-23 LAB
A/G RATIO: 2 (ref 0.8–2)
ALBUMIN SERPL-MCNC: 4.3 G/DL (ref 3.4–4.8)
ALP BLD-CCNC: 128 U/L (ref 25–100)
ALT SERPL-CCNC: 19 U/L (ref 4–36)
ANION GAP SERPL CALCULATED.3IONS-SCNC: 12 MMOL/L (ref 3–16)
AST SERPL-CCNC: 17 U/L (ref 8–33)
BILIRUB SERPL-MCNC: 0.5 MG/DL (ref 0.3–1.2)
BUN BLDV-MCNC: 12 MG/DL (ref 6–20)
CALCIUM SERPL-MCNC: 9 MG/DL (ref 8.5–10.5)
CHLORIDE BLD-SCNC: 103 MMOL/L (ref 98–107)
CO2: 26 MMOL/L (ref 20–30)
CREAT SERPL-MCNC: 0.8 MG/DL (ref 0.4–1.2)
GFR AFRICAN AMERICAN: >59
GFR NON-AFRICAN AMERICAN: >60
GLOBULIN: 2.2 G/DL
GLUCOSE BLD-MCNC: 107 MG/DL (ref 74–106)
HBA1C MFR BLD: 6.7 %
HCT VFR BLD CALC: 40 % (ref 37–47)
HEMOGLOBIN: 12.5 G/DL (ref 11.5–16.5)
MCH RBC QN AUTO: 28.4 PG (ref 27–32)
MCHC RBC AUTO-ENTMCNC: 31.3 G/DL (ref 31–35)
MCV RBC AUTO: 90.9 FL (ref 80–100)
PDW BLD-RTO: 13.2 % (ref 11–16)
PLATELET # BLD: 231 K/UL (ref 150–400)
PMV BLD AUTO: 11.7 FL (ref 6–10)
POTASSIUM SERPL-SCNC: 4.4 MMOL/L (ref 3.4–5.1)
RBC # BLD: 4.4 M/UL (ref 3.8–5.8)
SODIUM BLD-SCNC: 141 MMOL/L (ref 136–145)
TOTAL PROTEIN: 6.5 G/DL (ref 6.4–8.3)
TSH SERPL DL<=0.05 MIU/L-ACNC: 2.97 UIU/ML (ref 0.35–5.5)
WBC # BLD: 6.3 K/UL (ref 4–11)

## 2018-07-23 PROCEDURE — 36415 COLL VENOUS BLD VENIPUNCTURE: CPT

## 2018-07-23 PROCEDURE — 96372 THER/PROPH/DIAG INJ SC/IM: CPT | Performed by: NURSE PRACTITIONER

## 2018-07-23 PROCEDURE — 83036 HEMOGLOBIN GLYCOSYLATED A1C: CPT

## 2018-07-23 PROCEDURE — 85027 COMPLETE CBC AUTOMATED: CPT

## 2018-07-23 PROCEDURE — 84443 ASSAY THYROID STIM HORMONE: CPT

## 2018-07-23 PROCEDURE — 80053 COMPREHEN METABOLIC PANEL: CPT

## 2018-07-23 RX ORDER — CYANOCOBALAMIN 1000 UG/ML
1000 INJECTION INTRAMUSCULAR; SUBCUTANEOUS ONCE
Status: COMPLETED | OUTPATIENT
Start: 2018-07-23 | End: 2018-07-23

## 2018-07-23 RX ADMIN — CYANOCOBALAMIN 1000 MCG: 1000 INJECTION INTRAMUSCULAR; SUBCUTANEOUS at 13:37

## 2018-07-27 ENCOUNTER — OFFICE VISIT (OUTPATIENT)
Dept: PRIMARY CARE CLINIC | Age: 73
End: 2018-07-27
Payer: MEDICARE

## 2018-07-27 ENCOUNTER — HOSPITAL ENCOUNTER (OUTPATIENT)
Facility: HOSPITAL | Age: 73
Discharge: HOME | End: 2018-07-27
Payer: MEDICARE

## 2018-07-27 VITALS
RESPIRATION RATE: 16 BRPM | BODY MASS INDEX: 30.25 KG/M2 | WEIGHT: 164.4 LBS | TEMPERATURE: 97.7 F | HEART RATE: 72 BPM | DIASTOLIC BLOOD PRESSURE: 82 MMHG | SYSTOLIC BLOOD PRESSURE: 160 MMHG | OXYGEN SATURATION: 96 % | HEIGHT: 62 IN

## 2018-07-27 DIAGNOSIS — B37.9 YEAST INFECTION: ICD-10-CM

## 2018-07-27 DIAGNOSIS — E11.9 TYPE 2 DIABETES MELLITUS WITHOUT COMPLICATION, WITHOUT LONG-TERM CURRENT USE OF INSULIN (HCC): ICD-10-CM

## 2018-07-27 DIAGNOSIS — E78.00 PURE HYPERCHOLESTEROLEMIA: ICD-10-CM

## 2018-07-27 DIAGNOSIS — E55.9 VITAMIN D DEFICIENCY: ICD-10-CM

## 2018-07-27 DIAGNOSIS — E03.9 ACQUIRED HYPOTHYROIDISM: ICD-10-CM

## 2018-07-27 DIAGNOSIS — I10 ESSENTIAL HYPERTENSION: ICD-10-CM

## 2018-07-27 DIAGNOSIS — Z12.39 BREAST CANCER SCREENING: ICD-10-CM

## 2018-07-27 DIAGNOSIS — Z01.419 WELL FEMALE EXAM WITH ROUTINE GYNECOLOGICAL EXAM: Primary | ICD-10-CM

## 2018-07-27 DIAGNOSIS — F32.A DEPRESSION, UNSPECIFIED DEPRESSION TYPE: ICD-10-CM

## 2018-07-27 PROCEDURE — G8417 CALC BMI ABV UP PARAM F/U: HCPCS | Performed by: NURSE PRACTITIONER

## 2018-07-27 PROCEDURE — G8399 PT W/DXA RESULTS DOCUMENT: HCPCS | Performed by: NURSE PRACTITIONER

## 2018-07-27 PROCEDURE — G8427 DOCREV CUR MEDS BY ELIG CLIN: HCPCS | Performed by: NURSE PRACTITIONER

## 2018-07-27 PROCEDURE — 4040F PNEUMOC VAC/ADMIN/RCVD: CPT | Performed by: NURSE PRACTITIONER

## 2018-07-27 PROCEDURE — 2022F DILAT RTA XM EVC RTNOPTHY: CPT | Performed by: NURSE PRACTITIONER

## 2018-07-27 PROCEDURE — 87210 SMEAR WET MOUNT SALINE/INK: CPT

## 2018-07-27 PROCEDURE — 1036F TOBACCO NON-USER: CPT | Performed by: NURSE PRACTITIONER

## 2018-07-27 PROCEDURE — 3044F HG A1C LEVEL LT 7.0%: CPT | Performed by: NURSE PRACTITIONER

## 2018-07-27 PROCEDURE — 1123F ACP DISCUSS/DSCN MKR DOCD: CPT | Performed by: NURSE PRACTITIONER

## 2018-07-27 PROCEDURE — 99214 OFFICE O/P EST MOD 30 MIN: CPT | Performed by: NURSE PRACTITIONER

## 2018-07-27 PROCEDURE — 1101F PT FALLS ASSESS-DOCD LE1/YR: CPT | Performed by: NURSE PRACTITIONER

## 2018-07-27 PROCEDURE — 1090F PRES/ABSN URINE INCON ASSESS: CPT | Performed by: NURSE PRACTITIONER

## 2018-07-27 PROCEDURE — 3017F COLORECTAL CA SCREEN DOC REV: CPT | Performed by: NURSE PRACTITIONER

## 2018-07-27 RX ORDER — ERGOCALCIFEROL (VITAMIN D2) 1250 MCG
50000 CAPSULE ORAL WEEKLY
Qty: 12 CAPSULE | Refills: 3 | Status: SHIPPED | OUTPATIENT
Start: 2018-07-27 | End: 2019-04-01 | Stop reason: SDUPTHER

## 2018-07-27 RX ORDER — LISINOPRIL 10 MG/1
10 TABLET ORAL DAILY
Qty: 90 TABLET | Refills: 3 | Status: SHIPPED | OUTPATIENT
Start: 2018-07-27 | End: 2019-04-01 | Stop reason: SDUPTHER

## 2018-07-27 RX ORDER — LEVOTHYROXINE SODIUM 0.03 MG/1
TABLET ORAL
Qty: 90 TABLET | Refills: 3 | Status: SHIPPED | OUTPATIENT
Start: 2018-07-27 | End: 2019-04-01 | Stop reason: SDUPTHER

## 2018-07-27 RX ORDER — ATORVASTATIN CALCIUM 20 MG/1
20 TABLET, FILM COATED ORAL DAILY
Qty: 90 TABLET | Refills: 3 | Status: SHIPPED | OUTPATIENT
Start: 2018-07-27 | End: 2019-04-01 | Stop reason: SDUPTHER

## 2018-07-27 RX ORDER — MONTELUKAST SODIUM 10 MG/1
TABLET ORAL
Qty: 90 TABLET | Refills: 3 | Status: SHIPPED | OUTPATIENT
Start: 2018-07-27 | End: 2019-04-01 | Stop reason: SDUPTHER

## 2018-07-27 RX ORDER — NITROGLYCERIN 0.4 MG/1
0.4 TABLET SUBLINGUAL PRN
Qty: 25 TABLET | Refills: 3 | Status: SHIPPED | OUTPATIENT
Start: 2018-07-27 | End: 2019-04-01 | Stop reason: SDUPTHER

## 2018-07-27 RX ORDER — FLUTICASONE PROPIONATE 50 MCG
SPRAY, SUSPENSION (ML) NASAL
Qty: 16 G | Refills: 3 | Status: SHIPPED | OUTPATIENT
Start: 2018-07-27 | End: 2019-04-01 | Stop reason: SDUPTHER

## 2018-07-27 RX ORDER — ESOMEPRAZOLE MAGNESIUM 40 MG/1
CAPSULE, DELAYED RELEASE ORAL
Qty: 90 CAPSULE | Refills: 3 | Status: SHIPPED | OUTPATIENT
Start: 2018-07-27 | End: 2019-04-01 | Stop reason: SDUPTHER

## 2018-07-27 RX ORDER — ALENDRONATE SODIUM 70 MG/1
TABLET ORAL
Qty: 4 TABLET | Refills: 3 | Status: SHIPPED | OUTPATIENT
Start: 2018-07-27 | End: 2018-11-30 | Stop reason: SDUPTHER

## 2018-07-27 RX ORDER — LISINOPRIL 5 MG/1
TABLET ORAL
Qty: 90 TABLET | Refills: 3 | Status: CANCELLED | OUTPATIENT
Start: 2018-07-27

## 2018-07-27 RX ORDER — ESCITALOPRAM OXALATE 10 MG/1
TABLET ORAL
Qty: 90 TABLET | Refills: 3 | Status: SHIPPED | OUTPATIENT
Start: 2018-07-27 | End: 2019-04-01 | Stop reason: SDUPTHER

## 2018-07-27 RX ORDER — FLUCONAZOLE 100 MG/1
100 TABLET ORAL DAILY
Qty: 3 TABLET | Refills: 0 | Status: SHIPPED | OUTPATIENT
Start: 2018-07-27 | End: 2019-04-02 | Stop reason: SDUPTHER

## 2018-07-27 RX ORDER — ATENOLOL 50 MG/1
TABLET ORAL
Qty: 90 TABLET | Refills: 3 | Status: SHIPPED | OUTPATIENT
Start: 2018-07-27 | End: 2019-04-01 | Stop reason: SDUPTHER

## 2018-07-27 ASSESSMENT — ENCOUNTER SYMPTOMS
RESPIRATORY NEGATIVE: 1
GASTROINTESTINAL NEGATIVE: 1

## 2018-07-27 NOTE — PROGRESS NOTES
Chief Complaint   Patient presents with   [address] Gynecologic Exam       Have you seen any other physician or provider since your last visit no    Have you had any other diagnostic tests since your last visit? yes - labs    Have you changed or stopped any medications since your last visit? No      Patient is here for a 2 year follow up for pap smear. She is postmenopausal 20 years ago. Patient has been pregnant twice with 1 child.

## 2018-07-27 NOTE — PROGRESS NOTES
edema or tenderness. Lymphadenopathy:        Right: No inguinal adenopathy present. Left: No inguinal adenopathy present. Neurological: She is alert and oriented to person, place, and time. She has normal reflexes. Skin: Skin is warm and dry. No rash noted. No erythema. Psychiatric: She has a normal mood and affect. Judgment normal.   Nursing note and vitals reviewed.       Results in Past 30 Days  Result Component Current Result Ref Range Previous Result Ref Range   Alb 4.3 (7/23/2018) 3.4 - 4.8 g/dL Not in Time Range    Albumin/Globulin Ratio 2.0 (7/23/2018) 0.8 - 2.0 Not in Time Range    Alkaline Phosphatase 128 (H) (7/23/2018) 25 - 100 U/L Not in Time Range    ALT 19 (7/23/2018) 4 - 36 U/L Not in Time Range    AST 17 (7/23/2018) 8 - 33 U/L Not in Time Range    BUN 12 (7/23/2018) 6 - 20 mg/dL Not in Time Range    Calcium 9.0 (7/23/2018) 8.5 - 10.5 mg/dL Not in Time Range    Chloride 103 (7/23/2018) 98 - 107 mmol/L Not in Time Range    CO2 26 (7/23/2018) 20 - 30 mmol/L Not in Time Range    CREATININE 0.8 (7/23/2018) 0.4 - 1.2 mg/dL Not in Time Range    GFR  >59 (7/23/2018) >59 Not in Time Range    GFR Non- >60 (7/23/2018) >59 Not in Time Range    Globulin 2.2 (7/23/2018) g/dL Not in Time Range    Glucose 107 (H) (7/23/2018) 74 - 106 mg/dL Not in Time Range    Potassium 4.4 (7/23/2018) 3.4 - 5.1 mmol/L Not in Time Range    Sodium 141 (7/23/2018) 136 - 145 mmol/L Not in Time Range    Total Bilirubin 0.5 (7/23/2018) 0.3 - 1.2 mg/dL Not in Time Range    Total Protein 6.5 (7/23/2018) 6.4 - 8.3 g/dL Not in Time Range        Hemoglobin A1C (%)   Date Value   07/23/2018 6.7 (H)     Microalbumin, Random Urine (mg/dL)   Date Value   04/17/2017 <1.20     LDL Calculated (mg/dL)   Date Value   03/02/2018 99         Lab Results   Component Value Date    WBC 6.3 07/23/2018    NEUTROABS 3.4 04/07/2015    HGB 12.5 07/23/2018    HCT 40.0 07/23/2018    MCV 90.9 07/23/2018     fluticasone (FLONASE) 50 MCG/ACT nasal spray; instill 2 sprays into each nostril once daily. 90 day supply  -     Cancel: lisinopril (PRINIVIL;ZESTRIL) 5 MG tablet; take 1 tablet by mouth once daily  -     montelukast (SINGULAIR) 10 MG tablet; take 1 tablet by mouth at bedtime  -     fluconazole (DIFLUCAN) 100 MG tablet;  Take 1 tablet by mouth daily for 3 days      Medications Discontinued During This Encounter   Medication Reason    nitroGLYCERIN (NITROSTAT) 0.4 MG SL tablet REORDER    atorvastatin (LIPITOR) 20 MG tablet REORDER    alendronate (FOSAMAX) 70 MG tablet REORDER    atenolol (TENORMIN) 50 MG tablet REORDER    empagliflozin (JARDIANCE) 10 MG tablet REORDER    ergocalciferol (DRISDOL) 94206 units capsule REORDER    escitalopram (LEXAPRO) 10 MG tablet REORDER    esomeprazole (NEXIUM) 40 MG delayed release capsule REORDER    fluticasone (FLONASE) 50 MCG/ACT nasal spray REORDER    levothyroxine (SYNTHROID) 25 MCG tablet REORDER    metFORMIN (GLUCOPHAGE) 500 MG tablet REORDER    montelukast (SINGULAIR) 10 MG tablet REORDER

## 2018-08-17 ENCOUNTER — HOSPITAL ENCOUNTER (OUTPATIENT)
Dept: MAMMOGRAPHY | Facility: HOSPITAL | Age: 73
Discharge: HOME | End: 2018-08-17
Payer: MEDICARE

## 2018-08-17 DIAGNOSIS — Z12.39 BREAST CANCER SCREENING: ICD-10-CM

## 2018-08-17 PROCEDURE — 77063 BREAST TOMOSYNTHESIS BI: CPT

## 2018-08-27 ENCOUNTER — NURSE ONLY (OUTPATIENT)
Dept: PRIMARY CARE CLINIC | Age: 73
End: 2018-08-27
Payer: MEDICARE

## 2018-08-27 DIAGNOSIS — E53.8 VITAMIN B12 DEFICIENCY: Primary | ICD-10-CM

## 2018-08-27 PROCEDURE — 96372 THER/PROPH/DIAG INJ SC/IM: CPT | Performed by: NURSE PRACTITIONER

## 2018-08-27 RX ORDER — CYANOCOBALAMIN 1000 UG/ML
1000 INJECTION INTRAMUSCULAR; SUBCUTANEOUS ONCE
Status: COMPLETED | OUTPATIENT
Start: 2018-08-27 | End: 2018-08-27

## 2018-08-27 RX ADMIN — CYANOCOBALAMIN 1000 MCG: 1000 INJECTION INTRAMUSCULAR; SUBCUTANEOUS at 11:43

## 2018-10-01 ENCOUNTER — NURSE ONLY (OUTPATIENT)
Dept: PRIMARY CARE CLINIC | Age: 73
End: 2018-10-01
Payer: MEDICARE

## 2018-10-01 DIAGNOSIS — E53.8 B12 DEFICIENCY: Primary | ICD-10-CM

## 2018-10-01 DIAGNOSIS — Z23 NEED FOR INFLUENZA VACCINATION: ICD-10-CM

## 2018-10-01 PROCEDURE — G0008 ADMIN INFLUENZA VIRUS VAC: HCPCS | Performed by: NURSE PRACTITIONER

## 2018-10-01 PROCEDURE — 90688 IIV4 VACCINE SPLT 0.5 ML IM: CPT | Performed by: NURSE PRACTITIONER

## 2018-10-01 PROCEDURE — 96372 THER/PROPH/DIAG INJ SC/IM: CPT | Performed by: NURSE PRACTITIONER

## 2018-10-01 RX ORDER — CYANOCOBALAMIN 1000 UG/ML
1000 INJECTION INTRAMUSCULAR; SUBCUTANEOUS ONCE
Status: COMPLETED | OUTPATIENT
Start: 2018-10-01 | End: 2018-10-01

## 2018-10-01 RX ADMIN — CYANOCOBALAMIN 1000 MCG: 1000 INJECTION INTRAMUSCULAR; SUBCUTANEOUS at 10:54

## 2018-10-29 ENCOUNTER — NURSE ONLY (OUTPATIENT)
Dept: PRIMARY CARE CLINIC | Age: 73
End: 2018-10-29
Payer: MEDICARE

## 2018-10-29 DIAGNOSIS — E53.8 VITAMIN B12 DEFICIENCY: Primary | ICD-10-CM

## 2018-10-29 PROCEDURE — 96372 THER/PROPH/DIAG INJ SC/IM: CPT | Performed by: NURSE PRACTITIONER

## 2018-10-29 RX ORDER — CYANOCOBALAMIN 1000 UG/ML
1000 INJECTION INTRAMUSCULAR; SUBCUTANEOUS ONCE
Status: COMPLETED | OUTPATIENT
Start: 2018-10-29 | End: 2018-10-29

## 2018-10-29 RX ADMIN — CYANOCOBALAMIN 1000 MCG: 1000 INJECTION INTRAMUSCULAR; SUBCUTANEOUS at 13:34

## 2018-11-20 ENCOUNTER — OFFICE VISIT (OUTPATIENT)
Dept: PRIMARY CARE CLINIC | Age: 73
End: 2018-11-20
Payer: MEDICARE

## 2018-11-20 VITALS
HEART RATE: 81 BPM | SYSTOLIC BLOOD PRESSURE: 146 MMHG | BODY MASS INDEX: 29.89 KG/M2 | DIASTOLIC BLOOD PRESSURE: 80 MMHG | OXYGEN SATURATION: 93 % | WEIGHT: 163.4 LBS

## 2018-11-20 DIAGNOSIS — J20.9 ACUTE BRONCHITIS, UNSPECIFIED ORGANISM: ICD-10-CM

## 2018-11-20 DIAGNOSIS — J01.90 ACUTE SINUSITIS, RECURRENCE NOT SPECIFIED, UNSPECIFIED LOCATION: Primary | ICD-10-CM

## 2018-11-20 PROCEDURE — G8417 CALC BMI ABV UP PARAM F/U: HCPCS | Performed by: NURSE PRACTITIONER

## 2018-11-20 PROCEDURE — G8427 DOCREV CUR MEDS BY ELIG CLIN: HCPCS | Performed by: NURSE PRACTITIONER

## 2018-11-20 PROCEDURE — G8482 FLU IMMUNIZE ORDER/ADMIN: HCPCS | Performed by: NURSE PRACTITIONER

## 2018-11-20 PROCEDURE — 1123F ACP DISCUSS/DSCN MKR DOCD: CPT | Performed by: NURSE PRACTITIONER

## 2018-11-20 PROCEDURE — 3017F COLORECTAL CA SCREEN DOC REV: CPT | Performed by: NURSE PRACTITIONER

## 2018-11-20 PROCEDURE — 99213 OFFICE O/P EST LOW 20 MIN: CPT | Performed by: NURSE PRACTITIONER

## 2018-11-20 PROCEDURE — 1101F PT FALLS ASSESS-DOCD LE1/YR: CPT | Performed by: NURSE PRACTITIONER

## 2018-11-20 PROCEDURE — 1090F PRES/ABSN URINE INCON ASSESS: CPT | Performed by: NURSE PRACTITIONER

## 2018-11-20 PROCEDURE — 1036F TOBACCO NON-USER: CPT | Performed by: NURSE PRACTITIONER

## 2018-11-20 PROCEDURE — 4040F PNEUMOC VAC/ADMIN/RCVD: CPT | Performed by: NURSE PRACTITIONER

## 2018-11-20 PROCEDURE — G8399 PT W/DXA RESULTS DOCUMENT: HCPCS | Performed by: NURSE PRACTITIONER

## 2018-11-20 RX ORDER — LEVOFLOXACIN 500 MG/1
500 TABLET, FILM COATED ORAL DAILY
Qty: 10 TABLET | Refills: 0 | Status: SHIPPED | OUTPATIENT
Start: 2018-11-20 | End: 2018-11-30 | Stop reason: ALTCHOICE

## 2018-11-20 RX ORDER — FLUCONAZOLE 150 MG/1
150 TABLET ORAL EVERY OTHER DAY
Qty: 5 TABLET | Refills: 0 | Status: SHIPPED | OUTPATIENT
Start: 2018-11-20 | End: 2018-11-30 | Stop reason: ALTCHOICE

## 2018-11-20 ASSESSMENT — ENCOUNTER SYMPTOMS
NAUSEA: 0
COUGH: 1
SHORTNESS OF BREATH: 0
VOMITING: 0
ABDOMINAL PAIN: 0
SINUS PAIN: 1
EYE PAIN: 0
SORE THROAT: 0

## 2018-11-26 ENCOUNTER — NURSE ONLY (OUTPATIENT)
Dept: PRIMARY CARE CLINIC | Age: 73
End: 2018-11-26
Payer: MEDICARE

## 2018-11-26 DIAGNOSIS — E53.8 B12 DEFICIENCY: Primary | ICD-10-CM

## 2018-11-26 PROCEDURE — 96372 THER/PROPH/DIAG INJ SC/IM: CPT | Performed by: NURSE PRACTITIONER

## 2018-11-26 RX ORDER — CYANOCOBALAMIN 1000 UG/ML
1000 INJECTION INTRAMUSCULAR; SUBCUTANEOUS ONCE
Status: COMPLETED | OUTPATIENT
Start: 2018-11-26 | End: 2018-11-26

## 2018-11-26 RX ADMIN — CYANOCOBALAMIN 1000 MCG: 1000 INJECTION INTRAMUSCULAR; SUBCUTANEOUS at 11:21

## 2018-11-26 NOTE — PROGRESS NOTES
Administrations This Visit     cyanocobalamin injection 1,000 mcg     Admin Date  11/26/2018  11:21 Action  Given Dose  1000 mcg Route  Intramuscular Site  Deltoid Left Administered By  Dana Campbell    Ordering Provider:  MICKI Anderson    NDC:  06332-041-77    Lot#:  [id number]    :  [address] [place] [address]    Patient Supplied?:  No

## 2018-11-30 ENCOUNTER — OFFICE VISIT (OUTPATIENT)
Dept: PRIMARY CARE CLINIC | Age: 73
End: 2018-11-30
Payer: MEDICARE

## 2018-11-30 VITALS
HEART RATE: 66 BPM | DIASTOLIC BLOOD PRESSURE: 75 MMHG | BODY MASS INDEX: 30 KG/M2 | SYSTOLIC BLOOD PRESSURE: 136 MMHG | WEIGHT: 163 LBS | TEMPERATURE: 96.5 F | RESPIRATION RATE: 16 BRPM | OXYGEN SATURATION: 94 % | HEIGHT: 62 IN

## 2018-11-30 DIAGNOSIS — Z00.00 MEDICARE ANNUAL WELLNESS VISIT, INITIAL: Primary | ICD-10-CM

## 2018-11-30 PROCEDURE — G8482 FLU IMMUNIZE ORDER/ADMIN: HCPCS | Performed by: NURSE PRACTITIONER

## 2018-11-30 PROCEDURE — 4040F PNEUMOC VAC/ADMIN/RCVD: CPT | Performed by: NURSE PRACTITIONER

## 2018-11-30 PROCEDURE — G0438 PPPS, INITIAL VISIT: HCPCS | Performed by: NURSE PRACTITIONER

## 2018-11-30 RX ORDER — ALENDRONATE SODIUM 70 MG/1
TABLET ORAL
Qty: 12 TABLET | Refills: 3 | Status: SHIPPED | OUTPATIENT
Start: 2018-11-30 | End: 2019-04-01 | Stop reason: SDUPTHER

## 2018-11-30 ASSESSMENT — ANXIETY QUESTIONNAIRES: GAD7 TOTAL SCORE: 0

## 2018-11-30 ASSESSMENT — LIFESTYLE VARIABLES: HOW OFTEN DO YOU HAVE A DRINK CONTAINING ALCOHOL: 0

## 2018-11-30 ASSESSMENT — PATIENT HEALTH QUESTIONNAIRE - PHQ9
SUM OF ALL RESPONSES TO PHQ QUESTIONS 1-9: 0
SUM OF ALL RESPONSES TO PHQ QUESTIONS 1-9: 0

## 2018-11-30 NOTE — PROGRESS NOTES
25 MCG tablet take 1 tablet by mouth once daily Yes MICKI Zazueta   metFORMIN (GLUCOPHAGE) 500 MG tablet Take 1 tablet by mouth three times daily Yes MICKI Zazueta   montelukast (SINGULAIR) 10 MG tablet take 1 tablet by mouth at bedtime Yes MICKI Zazueta   lisinopril (PRINIVIL;ZESTRIL) 10 MG tablet Take 1 tablet by mouth daily Yes MICKI Zazueta   aspirin 81 MG EC tablet Take 1 tablet by mouth daily Yes MICKI Zazueta   ACCU-CHEK MULTICLIX LANCETS MISC Finger stick tid Yes MICKI Zazueta   Blood Glucose Monitoring Suppl (ACCU-CHEK TAYLA PLUS) W/DEVICE KIT 1 kit by Does not apply route 3 times daily as needed Dx:250.00 Yes MICKI Zazueta   fexofenadine (ALLEGRA) 180 MG tablet Take 1 tablet by mouth daily Yes MICKI Zazueta   calcium carbonate 600 MG TABS tablet Take 1 tablet by mouth daily.  Yes Historical Provider, MD       Past Medical History:   Diagnosis Date    Anxiety     Arthritis     Asthma     Diabetes mellitus ([facility] [address]     GERD (gastroesophageal reflux disease)     Hyperlipidemia     Hypertension     Hypothyroidism     Melanoma ([facility] [address] 2003    skin cancer removed from [facility]    Osteoporosis     Sleep apnea     Thyroid nodule      Past Surgical History:   Procedure Laterality Date    COLON SURGERY      POLYPS REMOVED    COLONOSCOPY  02/24/2017    Dr Fatimah Rutherford    POLYPECTOMY      SKIN CANCER EXCISION      RIGHT LEG     THYROIDECTOMY      WRIST SURGERY      LEFT       Family History   Problem Relation Age of Onset    Diabetes Mother     Heart Disease Mother         MI    Stroke Mother     Heart Disease Father         MI    Stroke Father     Diabetes Brother     Hypertension Brother     Diabetes Sister     Hypertension Sister        CareTeam (Including outside providers/suppliers regularly involved in providing care):   Patient Care Team:  MICKI Zazueta as PCP - General    Wt Readings from Last 3 Encounters:   11/30/18 163 lb (73.9

## 2018-12-03 NOTE — PROGRESS NOTES
SUBJECTIVE:    Patient ID: Dav Goel is a 72 y.o. female. Medicalhistory Review  Past Medical, Family, and Social History reviewed anddoes not contribute to the patient presentingcondition    Health Maintenance Due   Topic Date Due    Hepatitis C screen  1945    DTaP/Tdap/Td vaccine (1 - Tdap) 02/22/1966    Shingles Vaccine (1 of 2 - 2 Dose Series) 02/22/1997    Diabetic retinal exam  11/07/2017    Diabetic microalbuminuria test  04/17/2018       HPI:   Chief Complaint   Patient presents with    Sinus Problem     Patient here today for cough, congestion, sinus problems for about two weeks. She states she was hoping it would just go away.  Cough   She has noticed some wheezing as well with chills. Patient's medications, allergies, pastmedical, surgical, social and family histories were reviewed and updated as appropriate. Review of Systems Reviewed and acurate. See MA note. OBJECTIVE:  BP (!) 146/80 (Site: Right Upper Arm, Position: Sitting, Cuff Size: Medium Adult)   Pulse 81   Wt 163 lb 6.4 oz (74.1 kg)   SpO2 93%   BMI 29.89 kg/m²    Physical Exam   Constitutional: She is oriented to person, place, and time. She appears well-developed and well-nourished. No distress. HENT:   Head: Normocephalic. Right Ear: Tympanic membrane is bulging. Left Ear: Tympanic membrane is bulging. Mouth/Throat: No oropharyngeal exudate. Eyes: Lids are normal.   Neck: Neck supple. Cardiovascular: Normal rate, regular rhythm and normal heart sounds. Pulmonary/Chest: Effort normal. She has wheezes. Abdominal: Soft. Bowel sounds are normal. She exhibits no distension. There is no tenderness. Musculoskeletal: She exhibits no edema. Lymphadenopathy:     She has no cervical adenopathy. Neurological: She is alert and oriented to person, place, and time. Skin: Skin is warm and dry. Psychiatric: She has a normal mood and affect. Vitals reviewed.       No results found for requested labs within last 30 days. Hemoglobin A1C (%)   Date Value   07/23/2018 6.7 (H)     Microalbumin, Random Urine (mg/dL)   Date Value   04/17/2017 <1.20     LDL Calculated (mg/dL)   Date Value   03/02/2018 99             Lab Results   Component Value Date    TSH 2.97 07/23/2018       Prior to Visit Medications    Medication Sig Taking? Authorizing Provider   nitroGLYCERIN (NITROSTAT) 0.4 MG SL tablet Place 1 tablet under the tongue as needed for Chest pain Yes MICKI Cavazos   atorvastatin (LIPITOR) 20 MG tablet Take 1 tablet by mouth daily Yes MICKI Cavazos   atenolol (TENORMIN) 50 MG tablet take 1 tablet by mouth once daily Yes MICKI Cavazos   empagliflozin (JARDIANCE) 10 MG tablet Take 1 tablet by mouth daily Yes MICKI Cavazos   ergocalciferol (DRISDOL) 74766 units capsule Take 1 capsule by mouth once a week 90 day supply Yes MICKI Cavazos   escitalopram (LEXAPRO) 10 MG tablet take 1 tablet by mouth once daily Yes MICKI Cavazos   esomeprazole (NEXIUM) 40 MG delayed release capsule take 1 capsule by mouth every morning BEFORE BREAKFAST Yes MICKI Cavazos   fluticasone (FLONASE) 50 MCG/ACT nasal spray instill 2 sprays into each nostril once daily.  90 day supply Yes MICKI Cavazos   levothyroxine (SYNTHROID) 25 MCG tablet take 1 tablet by mouth once daily Yes MICKI Cavazos   metFORMIN (GLUCOPHAGE) 500 MG tablet Take 1 tablet by mouth three times daily Yes MICKI Cavazos   montelukast (SINGULAIR) 10 MG tablet take 1 tablet by mouth at bedtime Yes MICKI Cavazos   lisinopril (PRINIVIL;ZESTRIL) 10 MG tablet Take 1 tablet by mouth daily Yes MICKI Cavazos   aspirin 81 MG EC tablet Take 1 tablet by mouth daily Yes MICKI Cavazos   ACCU-CHEK MULTICLIX LANCETS MISC Finger stick tid Yes MICKI Cavazos   Blood Glucose Monitoring Suppl (ACCU-CHEK TAYLA PLUS) W/DEVICE KIT 1 kit by Does not apply route 3

## 2018-12-27 ENCOUNTER — HOSPITAL ENCOUNTER (EMERGENCY)
Facility: HOSPITAL | Age: 73
Discharge: HOME | End: 2018-12-27
Attending: FAMILY MEDICINE
Payer: MEDICARE

## 2018-12-27 ENCOUNTER — APPOINTMENT (OUTPATIENT)
Dept: GENERAL RADIOLOGY | Facility: HOSPITAL | Age: 73
End: 2018-12-27
Payer: MEDICARE

## 2018-12-27 VITALS
BODY MASS INDEX: 30.36 KG/M2 | TEMPERATURE: 98.6 F | DIASTOLIC BLOOD PRESSURE: 67 MMHG | HEIGHT: 62 IN | RESPIRATION RATE: 16 BRPM | WEIGHT: 165 LBS | SYSTOLIC BLOOD PRESSURE: 150 MMHG | OXYGEN SATURATION: 97 % | HEART RATE: 64 BPM

## 2018-12-27 DIAGNOSIS — S62.657A CLOSED NONDISPLACED FRACTURE OF MIDDLE PHALANX OF LEFT LITTLE FINGER, INITIAL ENCOUNTER: Primary | ICD-10-CM

## 2018-12-27 PROCEDURE — 99283 EMERGENCY DEPT VISIT LOW MDM: CPT

## 2018-12-27 PROCEDURE — 73140 X-RAY EXAM OF FINGER(S): CPT

## 2018-12-27 ASSESSMENT — PAIN SCALES - GENERAL
PAINLEVEL_OUTOF10: 2
PAINLEVEL_OUTOF10: 2

## 2018-12-27 ASSESSMENT — PAIN DESCRIPTION - LOCATION
LOCATION: FINGER (COMMENT WHICH ONE)
LOCATION: FINGER (COMMENT WHICH ONE)

## 2018-12-27 ASSESSMENT — PAIN DESCRIPTION - ORIENTATION
ORIENTATION: LEFT
ORIENTATION: LEFT

## 2018-12-27 ASSESSMENT — PAIN DESCRIPTION - FREQUENCY
FREQUENCY: CONTINUOUS
FREQUENCY: CONTINUOUS

## 2018-12-27 ASSESSMENT — PAIN DESCRIPTION - DESCRIPTORS
DESCRIPTORS: ACHING
DESCRIPTORS: CONSTANT

## 2018-12-27 ASSESSMENT — PAIN DESCRIPTION - PAIN TYPE
TYPE: ACUTE PAIN
TYPE: ACUTE PAIN

## 2018-12-27 NOTE — ED PROVIDER NOTES
Other Topics Concern    None     Social History Narrative    None       SCREENINGS             PHYSICAL EXAM    (up to 7 for level 4, 8 or more for level 5)     ED Triage Vitals [12/27/18 1610]   BP Temp Temp Source Pulse Resp SpO2 Height Weight   (!) 199/81 98.6 °F (37 °C) Oral 67 18 96 % 5' 2\" (1.575 m) 165 lb (74.8 kg)       Physical Exam   Constitutional: She is oriented to person, place, and time. She appears well-developed and well-nourished. Musculoskeletal:   There is swelling, tenderness, and discoloration to the left little finger over the PIP joint and middle phalanx. Tendon function is preserved. Neurological: She is alert and oriented to person, place, and time. Nursing note and vitals reviewed. DIAGNOSTIC RESULTS     EKG: All EKG's are interpreted by the Emergency Department Physician who either signs or Co-signs this chart in the absence of a cardiologist.        RADIOLOGY:   Non-plain film images such as CT, Ultrasound and MRI are read by the radiologist. Marley Sit images are visualized and preliminarily interpreted by the emergency physician with the below findings:        Interpretation per the Radiologist below, if available at the time of this note:    XR FINGER LEFT (MIN 2 VIEWS)   Final Result      1. Acute diaphyseal fracture of the fifth middle phalanx. No intra-articular extension visible. ED BEDSIDE ULTRASOUND:   Performed by ED Physician - none    LABS:  Labs Reviewed - No data to display    All other labs were within normal range or not returned as of this dictation. EMERGENCY DEPARTMENT COURSE and DIFFERENTIALDIAGNOSIS/MDM:   Vitals:    Vitals:    12/27/18 1610   BP: (!) 199/81   Pulse: 67   Resp: 18   Temp: 98.6 °F (37 °C)   TempSrc: Oral   SpO2: 96%   Weight: 165 lb (74.8 kg)   Height: 5' 2\" (1.575 m)           CRITICALCARE TIME   Total Critical Care time was 0 minutes, excludingseparately reportable procedures.   There was a high probabilityof

## 2018-12-28 ENCOUNTER — OFFICE VISIT (OUTPATIENT)
Dept: PRIMARY CARE CLINIC | Age: 73
End: 2018-12-28
Payer: MEDICARE

## 2018-12-28 VITALS
WEIGHT: 161 LBS | OXYGEN SATURATION: 99 % | BODY MASS INDEX: 29.63 KG/M2 | HEART RATE: 70 BPM | DIASTOLIC BLOOD PRESSURE: 85 MMHG | TEMPERATURE: 97.7 F | HEIGHT: 62 IN | SYSTOLIC BLOOD PRESSURE: 172 MMHG

## 2018-12-28 DIAGNOSIS — E53.8 B12 DEFICIENCY: Primary | ICD-10-CM

## 2018-12-28 PROCEDURE — 3017F COLORECTAL CA SCREEN DOC REV: CPT | Performed by: NURSE PRACTITIONER

## 2018-12-28 PROCEDURE — G8427 DOCREV CUR MEDS BY ELIG CLIN: HCPCS | Performed by: NURSE PRACTITIONER

## 2018-12-28 PROCEDURE — G8417 CALC BMI ABV UP PARAM F/U: HCPCS | Performed by: NURSE PRACTITIONER

## 2018-12-28 PROCEDURE — 1090F PRES/ABSN URINE INCON ASSESS: CPT | Performed by: NURSE PRACTITIONER

## 2018-12-28 PROCEDURE — 99213 OFFICE O/P EST LOW 20 MIN: CPT | Performed by: NURSE PRACTITIONER

## 2018-12-28 PROCEDURE — 1123F ACP DISCUSS/DSCN MKR DOCD: CPT | Performed by: NURSE PRACTITIONER

## 2018-12-28 PROCEDURE — G8482 FLU IMMUNIZE ORDER/ADMIN: HCPCS | Performed by: NURSE PRACTITIONER

## 2018-12-28 PROCEDURE — G8399 PT W/DXA RESULTS DOCUMENT: HCPCS | Performed by: NURSE PRACTITIONER

## 2018-12-28 PROCEDURE — 96372 THER/PROPH/DIAG INJ SC/IM: CPT | Performed by: NURSE PRACTITIONER

## 2018-12-28 PROCEDURE — 1036F TOBACCO NON-USER: CPT | Performed by: NURSE PRACTITIONER

## 2018-12-28 PROCEDURE — 4040F PNEUMOC VAC/ADMIN/RCVD: CPT | Performed by: NURSE PRACTITIONER

## 2018-12-28 PROCEDURE — 1101F PT FALLS ASSESS-DOCD LE1/YR: CPT | Performed by: NURSE PRACTITIONER

## 2018-12-28 RX ORDER — CYANOCOBALAMIN 1000 UG/ML
1000 INJECTION INTRAMUSCULAR; SUBCUTANEOUS ONCE
Status: COMPLETED | OUTPATIENT
Start: 2018-12-28 | End: 2018-12-28

## 2018-12-28 RX ADMIN — CYANOCOBALAMIN 1000 MCG: 1000 INJECTION INTRAMUSCULAR; SUBCUTANEOUS at 15:41

## 2018-12-28 ASSESSMENT — ENCOUNTER SYMPTOMS: RESPIRATORY NEGATIVE: 1

## 2018-12-28 NOTE — PROGRESS NOTES
tablet by mouth once daily Yes MICKI Shahid   metFORMIN (GLUCOPHAGE) 500 MG tablet Take 1 tablet by mouth three times daily Yes MICKI Shahid   montelukast (SINGULAIR) 10 MG tablet take 1 tablet by mouth at bedtime Yes MICKI Shahid   lisinopril (PRINIVIL;ZESTRIL) 10 MG tablet Take 1 tablet by mouth daily Yes MICKI Shahid   aspirin 81 MG EC tablet Take 1 tablet by mouth daily Yes MICKI Shahid   ACCU-CHEK MULTICLIX LANCETS MISC Finger stick tid Yes MICKI Shahid   Blood Glucose Monitoring Suppl (ACCU-CHEK TAYLA PLUS) W/DEVICE KIT 1 kit by Does not apply route 3 times daily as needed Dx:250.00 Yes MICKI Shahid   fexofenadine (ALLEGRA) 180 MG tablet Take 1 tablet by mouth daily Yes MICKI Shahid   calcium carbonate 600 MG TABS tablet Take 1 tablet by mouth daily. Yes Historical Provider, MD        Allergies   Allergen Reactions    Latex Rash    Ampicillin     Codeine     Erythromycin     Pcn [Penicillins]        Past Medical History:   Diagnosis Date    Anxiety     Arthritis     Asthma     Diabetes mellitus ([facility] [address]     GERD (gastroesophageal reflux disease)     Hyperlipidemia     Hypertension     Hypothyroidism     Melanoma ([facility] [address] 2003    skin cancer removed from [facility]    Osteoporosis     Sleep apnea     Thyroid nodule        Past Surgical History:   Procedure Laterality Date    COLON SURGERY      POLYPS REMOVED    COLONOSCOPY  02/24/2017    Dr Shireen Dawn    POLYPECTOMY      SKIN CANCER EXCISION      RIGHT LEG     THYROIDECTOMY      WRIST SURGERY      LEFT       Social History     Social History    Marital status:      Spouse name: N/A    Number of children: N/A    Years of education: N/A     Occupational History    Not on file.      Social History Main Topics    Smoking status: Never Smoker    Smokeless tobacco: Never Used    Alcohol use No    Drug use: No    Sexual activity: Not Currently     Other Topics Concern    Not

## 2019-01-11 ENCOUNTER — OFFICE VISIT (OUTPATIENT)
Dept: PRIMARY CARE CLINIC | Age: 74
End: 2019-01-11
Payer: MEDICARE

## 2019-01-11 VITALS
DIASTOLIC BLOOD PRESSURE: 61 MMHG | SYSTOLIC BLOOD PRESSURE: 124 MMHG | HEART RATE: 68 BPM | TEMPERATURE: 97 F | HEIGHT: 62 IN | OXYGEN SATURATION: 95 % | BODY MASS INDEX: 30.11 KG/M2 | WEIGHT: 163.6 LBS | RESPIRATION RATE: 16 BRPM

## 2019-01-11 DIAGNOSIS — S62.657A CLOSED NONDISPLACED FRACTURE OF MIDDLE PHALANX OF LEFT LITTLE FINGER, INITIAL ENCOUNTER: Primary | ICD-10-CM

## 2019-01-11 DIAGNOSIS — M81.0 OSTEOPOROSIS, UNSPECIFIED OSTEOPOROSIS TYPE, UNSPECIFIED PATHOLOGICAL FRACTURE PRESENCE: ICD-10-CM

## 2019-01-11 PROCEDURE — G8399 PT W/DXA RESULTS DOCUMENT: HCPCS | Performed by: NURSE PRACTITIONER

## 2019-01-11 PROCEDURE — 4040F PNEUMOC VAC/ADMIN/RCVD: CPT | Performed by: NURSE PRACTITIONER

## 2019-01-11 PROCEDURE — 1090F PRES/ABSN URINE INCON ASSESS: CPT | Performed by: NURSE PRACTITIONER

## 2019-01-11 PROCEDURE — 1101F PT FALLS ASSESS-DOCD LE1/YR: CPT | Performed by: NURSE PRACTITIONER

## 2019-01-11 PROCEDURE — G8427 DOCREV CUR MEDS BY ELIG CLIN: HCPCS | Performed by: NURSE PRACTITIONER

## 2019-01-11 PROCEDURE — 99213 OFFICE O/P EST LOW 20 MIN: CPT | Performed by: NURSE PRACTITIONER

## 2019-01-11 PROCEDURE — 3017F COLORECTAL CA SCREEN DOC REV: CPT | Performed by: NURSE PRACTITIONER

## 2019-01-11 PROCEDURE — G8417 CALC BMI ABV UP PARAM F/U: HCPCS | Performed by: NURSE PRACTITIONER

## 2019-01-11 PROCEDURE — 1123F ACP DISCUSS/DSCN MKR DOCD: CPT | Performed by: NURSE PRACTITIONER

## 2019-01-11 PROCEDURE — G8482 FLU IMMUNIZE ORDER/ADMIN: HCPCS | Performed by: NURSE PRACTITIONER

## 2019-01-11 PROCEDURE — 1036F TOBACCO NON-USER: CPT | Performed by: NURSE PRACTITIONER

## 2019-01-17 ENCOUNTER — HOSPITAL ENCOUNTER (OUTPATIENT)
Dept: GENERAL RADIOLOGY | Facility: HOSPITAL | Age: 74
Discharge: HOME | End: 2019-01-17
Payer: MEDICARE

## 2019-01-17 DIAGNOSIS — M81.0 OSTEOPOROSIS, UNSPECIFIED OSTEOPOROSIS TYPE, UNSPECIFIED PATHOLOGICAL FRACTURE PRESENCE: ICD-10-CM

## 2019-01-17 PROCEDURE — 77080 DXA BONE DENSITY AXIAL: CPT

## 2019-01-20 ASSESSMENT — ENCOUNTER SYMPTOMS
RESPIRATORY NEGATIVE: 1
GASTROINTESTINAL NEGATIVE: 1

## 2019-01-23 ENCOUNTER — OFFICE VISIT (OUTPATIENT)
Dept: PRIMARY CARE CLINIC | Age: 74
End: 2019-01-23
Payer: MEDICARE

## 2019-01-23 ENCOUNTER — HOSPITAL ENCOUNTER (OUTPATIENT)
Facility: HOSPITAL | Age: 74
Discharge: HOME | End: 2019-01-23
Payer: MEDICARE

## 2019-01-23 ENCOUNTER — HOSPITAL ENCOUNTER (OUTPATIENT)
Dept: GENERAL RADIOLOGY | Facility: HOSPITAL | Age: 74
Discharge: HOME | End: 2019-01-23
Payer: MEDICARE

## 2019-01-23 VITALS
WEIGHT: 160.4 LBS | OXYGEN SATURATION: 98 % | DIASTOLIC BLOOD PRESSURE: 76 MMHG | HEIGHT: 62 IN | BODY MASS INDEX: 29.52 KG/M2 | TEMPERATURE: 99.1 F | HEART RATE: 86 BPM | RESPIRATION RATE: 16 BRPM | SYSTOLIC BLOOD PRESSURE: 150 MMHG

## 2019-01-23 DIAGNOSIS — S62.657A CLOSED NONDISPLACED FRACTURE OF MIDDLE PHALANX OF LEFT LITTLE FINGER, INITIAL ENCOUNTER: ICD-10-CM

## 2019-01-23 DIAGNOSIS — J01.00 ACUTE NON-RECURRENT MAXILLARY SINUSITIS: Primary | ICD-10-CM

## 2019-01-23 DIAGNOSIS — B37.9 YEAST INFECTION: ICD-10-CM

## 2019-01-23 PROCEDURE — 1123F ACP DISCUSS/DSCN MKR DOCD: CPT | Performed by: NURSE PRACTITIONER

## 2019-01-23 PROCEDURE — G8482 FLU IMMUNIZE ORDER/ADMIN: HCPCS | Performed by: NURSE PRACTITIONER

## 2019-01-23 PROCEDURE — G8417 CALC BMI ABV UP PARAM F/U: HCPCS | Performed by: NURSE PRACTITIONER

## 2019-01-23 PROCEDURE — 73140 X-RAY EXAM OF FINGER(S): CPT

## 2019-01-23 PROCEDURE — 3017F COLORECTAL CA SCREEN DOC REV: CPT | Performed by: NURSE PRACTITIONER

## 2019-01-23 PROCEDURE — 1090F PRES/ABSN URINE INCON ASSESS: CPT | Performed by: NURSE PRACTITIONER

## 2019-01-23 PROCEDURE — 1036F TOBACCO NON-USER: CPT | Performed by: NURSE PRACTITIONER

## 2019-01-23 PROCEDURE — G8427 DOCREV CUR MEDS BY ELIG CLIN: HCPCS | Performed by: NURSE PRACTITIONER

## 2019-01-23 PROCEDURE — G8399 PT W/DXA RESULTS DOCUMENT: HCPCS | Performed by: NURSE PRACTITIONER

## 2019-01-23 PROCEDURE — 99213 OFFICE O/P EST LOW 20 MIN: CPT | Performed by: NURSE PRACTITIONER

## 2019-01-23 PROCEDURE — 4040F PNEUMOC VAC/ADMIN/RCVD: CPT | Performed by: NURSE PRACTITIONER

## 2019-01-23 PROCEDURE — 1101F PT FALLS ASSESS-DOCD LE1/YR: CPT | Performed by: NURSE PRACTITIONER

## 2019-01-23 RX ORDER — BENZONATATE 200 MG/1
200 CAPSULE ORAL 3 TIMES DAILY PRN
Qty: 30 CAPSULE | Refills: 0 | Status: SHIPPED | OUTPATIENT
Start: 2019-01-23 | End: 2019-01-30

## 2019-01-23 RX ORDER — FLUCONAZOLE 150 MG/1
150 TABLET ORAL DAILY
Qty: 3 TABLET | Refills: 0 | Status: SHIPPED | OUTPATIENT
Start: 2019-01-23 | End: 2019-01-26

## 2019-01-23 RX ORDER — CEFDINIR 300 MG/1
300 CAPSULE ORAL 2 TIMES DAILY
Qty: 20 CAPSULE | Refills: 0 | Status: SHIPPED | OUTPATIENT
Start: 2019-01-23 | End: 2019-08-30 | Stop reason: SDUPTHER

## 2019-01-23 ASSESSMENT — ENCOUNTER SYMPTOMS
SORE THROAT: 1
SINUS PRESSURE: 1
COUGH: 1

## 2019-01-24 ENCOUNTER — TELEPHONE (OUTPATIENT)
Dept: PRIMARY CARE CLINIC | Age: 74
End: 2019-01-24

## 2019-01-24 DIAGNOSIS — S62.657A CLOSED NONDISPLACED FRACTURE OF MIDDLE PHALANX OF LEFT LITTLE FINGER, INITIAL ENCOUNTER: Primary | ICD-10-CM

## 2019-01-28 ENCOUNTER — NURSE ONLY (OUTPATIENT)
Dept: PRIMARY CARE CLINIC | Age: 74
End: 2019-01-28
Payer: MEDICARE

## 2019-01-28 DIAGNOSIS — E53.8 VITAMIN B12 DEFICIENCY: Primary | ICD-10-CM

## 2019-01-28 PROCEDURE — 96372 THER/PROPH/DIAG INJ SC/IM: CPT | Performed by: NURSE PRACTITIONER

## 2019-01-28 RX ORDER — CYANOCOBALAMIN 1000 UG/ML
1000 INJECTION INTRAMUSCULAR; SUBCUTANEOUS ONCE
Status: COMPLETED | OUTPATIENT
Start: 2019-01-28 | End: 2019-01-28

## 2019-01-28 RX ADMIN — CYANOCOBALAMIN 1000 MCG: 1000 INJECTION INTRAMUSCULAR; SUBCUTANEOUS at 13:18

## 2019-02-25 ENCOUNTER — HOSPITAL ENCOUNTER (OUTPATIENT)
Facility: HOSPITAL | Age: 74
End: 2019-02-25
Payer: MEDICARE

## 2019-02-25 ENCOUNTER — HOSPITAL ENCOUNTER (OUTPATIENT)
Dept: GENERAL RADIOLOGY | Facility: HOSPITAL | Age: 74
Discharge: HOME | End: 2019-02-25
Payer: MEDICARE

## 2019-02-25 DIAGNOSIS — S62.657A CLOSED NONDISPLACED FRACTURE OF MIDDLE PHALANX OF LEFT LITTLE FINGER, INITIAL ENCOUNTER: ICD-10-CM

## 2019-02-25 PROCEDURE — 73140 X-RAY EXAM OF FINGER(S): CPT

## 2019-02-27 ENCOUNTER — TELEPHONE (OUTPATIENT)
Dept: PRIMARY CARE CLINIC | Age: 74
End: 2019-02-27

## 2019-03-01 ENCOUNTER — NURSE ONLY (OUTPATIENT)
Dept: PRIMARY CARE CLINIC | Age: 74
End: 2019-03-01
Payer: MEDICARE

## 2019-03-01 DIAGNOSIS — L98.9 LESION OF SKIN OF SCALP: Primary | ICD-10-CM

## 2019-03-01 PROCEDURE — 96372 THER/PROPH/DIAG INJ SC/IM: CPT | Performed by: NURSE PRACTITIONER

## 2019-03-01 RX ORDER — CYANOCOBALAMIN 1000 UG/ML
1000 INJECTION INTRAMUSCULAR; SUBCUTANEOUS ONCE
Status: COMPLETED | OUTPATIENT
Start: 2019-03-01 | End: 2019-03-01

## 2019-03-01 RX ADMIN — CYANOCOBALAMIN 1000 MCG: 1000 INJECTION INTRAMUSCULAR; SUBCUTANEOUS at 11:51

## 2019-04-01 ENCOUNTER — HOSPITAL ENCOUNTER (OUTPATIENT)
Facility: HOSPITAL | Age: 74
Discharge: HOME | End: 2019-04-01
Payer: MEDICARE

## 2019-04-01 ENCOUNTER — HOSPITAL ENCOUNTER (OUTPATIENT)
Dept: GENERAL RADIOLOGY | Facility: HOSPITAL | Age: 74
Discharge: HOME | End: 2019-04-01
Payer: MEDICARE

## 2019-04-01 ENCOUNTER — OFFICE VISIT (OUTPATIENT)
Dept: PRIMARY CARE CLINIC | Age: 74
End: 2019-04-01
Payer: MEDICARE

## 2019-04-01 VITALS
OXYGEN SATURATION: 97 % | RESPIRATION RATE: 16 BRPM | TEMPERATURE: 98 F | HEIGHT: 62 IN | SYSTOLIC BLOOD PRESSURE: 130 MMHG | HEART RATE: 65 BPM | WEIGHT: 162 LBS | BODY MASS INDEX: 29.81 KG/M2 | DIASTOLIC BLOOD PRESSURE: 88 MMHG

## 2019-04-01 DIAGNOSIS — S62.657A CLOSED NONDISPLACED FRACTURE OF MIDDLE PHALANX OF LEFT LITTLE FINGER, INITIAL ENCOUNTER: ICD-10-CM

## 2019-04-01 DIAGNOSIS — E55.9 VITAMIN D DEFICIENCY: ICD-10-CM

## 2019-04-01 DIAGNOSIS — E03.9 ACQUIRED HYPOTHYROIDISM: ICD-10-CM

## 2019-04-01 DIAGNOSIS — I10 ESSENTIAL HYPERTENSION: ICD-10-CM

## 2019-04-01 DIAGNOSIS — J30.9 ALLERGIC RHINITIS, UNSPECIFIED SEASONALITY, UNSPECIFIED TRIGGER: ICD-10-CM

## 2019-04-01 DIAGNOSIS — E78.00 PURE HYPERCHOLESTEROLEMIA: ICD-10-CM

## 2019-04-01 DIAGNOSIS — K52.9 GASTROENTERITIS: ICD-10-CM

## 2019-04-01 DIAGNOSIS — F32.A DEPRESSION, UNSPECIFIED DEPRESSION TYPE: ICD-10-CM

## 2019-04-01 DIAGNOSIS — E11.9 TYPE 2 DIABETES MELLITUS WITHOUT COMPLICATION, WITHOUT LONG-TERM CURRENT USE OF INSULIN (HCC): Primary | ICD-10-CM

## 2019-04-01 DIAGNOSIS — E11.9 TYPE 2 DIABETES MELLITUS WITHOUT COMPLICATION, WITHOUT LONG-TERM CURRENT USE OF INSULIN (HCC): ICD-10-CM

## 2019-04-01 DIAGNOSIS — Z00.00 MEDICARE ANNUAL WELLNESS VISIT, INITIAL: ICD-10-CM

## 2019-04-01 DIAGNOSIS — E53.8 VITAMIN B12 DEFICIENCY: ICD-10-CM

## 2019-04-01 LAB
A/G RATIO: 1.9 (ref 0.8–2)
ALBUMIN SERPL-MCNC: 4.5 G/DL (ref 3.4–4.8)
ALP BLD-CCNC: 130 U/L (ref 25–100)
ALT SERPL-CCNC: 16 U/L (ref 4–36)
ANION GAP SERPL CALCULATED.3IONS-SCNC: 10 MMOL/L (ref 3–16)
AST SERPL-CCNC: 14 U/L (ref 8–33)
BILIRUB SERPL-MCNC: 0.4 MG/DL (ref 0.3–1.2)
BUN BLDV-MCNC: 15 MG/DL (ref 6–20)
CALCIUM SERPL-MCNC: 9.4 MG/DL (ref 8.5–10.5)
CHLORIDE BLD-SCNC: 106 MMOL/L (ref 98–107)
CO2: 26 MMOL/L (ref 20–30)
CREAT SERPL-MCNC: 0.9 MG/DL (ref 0.4–1.2)
CREATININE URINE: 64.2 MG/DL (ref 1.5–300)
GFR AFRICAN AMERICAN: >59
GFR NON-AFRICAN AMERICAN: >60
GLOBULIN: 2.4 G/DL
GLUCOSE BLD-MCNC: 116 MG/DL (ref 74–106)
HBA1C MFR BLD: 6.6 %
HCT VFR BLD CALC: 42.7 % (ref 37–47)
HEMOGLOBIN: 13.4 G/DL (ref 11.5–16.5)
MCH RBC QN AUTO: 29.1 PG (ref 27–32)
MCHC RBC AUTO-ENTMCNC: 31.4 G/DL (ref 31–35)
MCV RBC AUTO: 92.6 FL (ref 80–100)
MICROALBUMIN UR-MCNC: <1.2 MG/DL (ref 0–22)
MICROALBUMIN/CREAT UR-RTO: NORMAL MG/G (ref 0–30)
PDW BLD-RTO: 13.2 % (ref 11–16)
PLATELET # BLD: 249 K/UL (ref 150–400)
PMV BLD AUTO: 11.6 FL (ref 6–10)
POTASSIUM SERPL-SCNC: 4.2 MMOL/L (ref 3.4–5.1)
RBC # BLD: 4.61 M/UL (ref 3.8–5.8)
SODIUM BLD-SCNC: 142 MMOL/L (ref 136–145)
TOTAL PROTEIN: 6.9 G/DL (ref 6.4–8.3)
VITAMIN D 25-HYDROXY: 30.7 (ref 32–100)
WBC # BLD: 8.1 K/UL (ref 4–11)

## 2019-04-01 PROCEDURE — 1090F PRES/ABSN URINE INCON ASSESS: CPT | Performed by: NURSE PRACTITIONER

## 2019-04-01 PROCEDURE — 3017F COLORECTAL CA SCREEN DOC REV: CPT | Performed by: NURSE PRACTITIONER

## 2019-04-01 PROCEDURE — 85027 COMPLETE CBC AUTOMATED: CPT

## 2019-04-01 PROCEDURE — 1036F TOBACCO NON-USER: CPT | Performed by: NURSE PRACTITIONER

## 2019-04-01 PROCEDURE — 1123F ACP DISCUSS/DSCN MKR DOCD: CPT | Performed by: NURSE PRACTITIONER

## 2019-04-01 PROCEDURE — 96372 THER/PROPH/DIAG INJ SC/IM: CPT | Performed by: NURSE PRACTITIONER

## 2019-04-01 PROCEDURE — 2022F DILAT RTA XM EVC RTNOPTHY: CPT | Performed by: NURSE PRACTITIONER

## 2019-04-01 PROCEDURE — 80053 COMPREHEN METABOLIC PANEL: CPT

## 2019-04-01 PROCEDURE — 3044F HG A1C LEVEL LT 7.0%: CPT | Performed by: NURSE PRACTITIONER

## 2019-04-01 PROCEDURE — 82043 UR ALBUMIN QUANTITATIVE: CPT

## 2019-04-01 PROCEDURE — G8417 CALC BMI ABV UP PARAM F/U: HCPCS | Performed by: NURSE PRACTITIONER

## 2019-04-01 PROCEDURE — 73140 X-RAY EXAM OF FINGER(S): CPT

## 2019-04-01 PROCEDURE — 82306 VITAMIN D 25 HYDROXY: CPT

## 2019-04-01 PROCEDURE — 4040F PNEUMOC VAC/ADMIN/RCVD: CPT | Performed by: NURSE PRACTITIONER

## 2019-04-01 PROCEDURE — 82570 ASSAY OF URINE CREATININE: CPT

## 2019-04-01 PROCEDURE — 36415 COLL VENOUS BLD VENIPUNCTURE: CPT

## 2019-04-01 PROCEDURE — G8399 PT W/DXA RESULTS DOCUMENT: HCPCS | Performed by: NURSE PRACTITIONER

## 2019-04-01 PROCEDURE — 83036 HEMOGLOBIN GLYCOSYLATED A1C: CPT

## 2019-04-01 PROCEDURE — G8427 DOCREV CUR MEDS BY ELIG CLIN: HCPCS | Performed by: NURSE PRACTITIONER

## 2019-04-01 PROCEDURE — 99214 OFFICE O/P EST MOD 30 MIN: CPT | Performed by: NURSE PRACTITIONER

## 2019-04-01 RX ORDER — FLUTICASONE PROPIONATE 50 MCG
SPRAY, SUSPENSION (ML) NASAL
Qty: 16 G | Refills: 3 | Status: SHIPPED | OUTPATIENT
Start: 2019-04-01 | End: 2019-07-19 | Stop reason: SDUPTHER

## 2019-04-01 RX ORDER — ATORVASTATIN CALCIUM 20 MG/1
20 TABLET, FILM COATED ORAL DAILY
Qty: 90 TABLET | Refills: 3 | Status: SHIPPED | OUTPATIENT
Start: 2019-04-01 | End: 2019-07-19 | Stop reason: SDUPTHER

## 2019-04-01 RX ORDER — ESOMEPRAZOLE MAGNESIUM 40 MG/1
CAPSULE, DELAYED RELEASE ORAL
Qty: 90 CAPSULE | Refills: 3 | Status: SHIPPED | OUTPATIENT
Start: 2019-04-01 | End: 2019-07-19 | Stop reason: SDUPTHER

## 2019-04-01 RX ORDER — NITROGLYCERIN 0.4 MG/1
0.4 TABLET SUBLINGUAL PRN
Qty: 25 TABLET | Refills: 3 | Status: SHIPPED | OUTPATIENT
Start: 2019-04-01 | End: 2019-07-19 | Stop reason: SDUPTHER

## 2019-04-01 RX ORDER — ESCITALOPRAM OXALATE 10 MG/1
TABLET ORAL
Qty: 90 TABLET | Refills: 3 | Status: SHIPPED | OUTPATIENT
Start: 2019-04-01 | End: 2019-07-19 | Stop reason: SDUPTHER

## 2019-04-01 RX ORDER — ERGOCALCIFEROL (VITAMIN D2) 1250 MCG
50000 CAPSULE ORAL WEEKLY
Qty: 12 CAPSULE | Refills: 3 | Status: SHIPPED | OUTPATIENT
Start: 2019-04-01 | End: 2019-07-19 | Stop reason: SDUPTHER

## 2019-04-01 RX ORDER — MONTELUKAST SODIUM 10 MG/1
TABLET ORAL
Qty: 90 TABLET | Refills: 3 | Status: SHIPPED | OUTPATIENT
Start: 2019-04-01 | End: 2019-07-19 | Stop reason: SDUPTHER

## 2019-04-01 RX ORDER — CYANOCOBALAMIN 1000 UG/ML
1000 INJECTION INTRAMUSCULAR; SUBCUTANEOUS ONCE
Status: COMPLETED | OUTPATIENT
Start: 2019-04-01 | End: 2019-04-01

## 2019-04-01 RX ORDER — ATENOLOL 50 MG/1
TABLET ORAL
Qty: 90 TABLET | Refills: 3 | Status: SHIPPED | OUTPATIENT
Start: 2019-04-01 | End: 2019-07-19 | Stop reason: SDUPTHER

## 2019-04-01 RX ORDER — LEVOTHYROXINE SODIUM 0.03 MG/1
TABLET ORAL
Qty: 90 TABLET | Refills: 3 | Status: SHIPPED | OUTPATIENT
Start: 2019-04-01 | End: 2019-07-19 | Stop reason: SDUPTHER

## 2019-04-01 RX ORDER — LISINOPRIL 10 MG/1
10 TABLET ORAL DAILY
Qty: 90 TABLET | Refills: 3 | Status: SHIPPED | OUTPATIENT
Start: 2019-04-01 | End: 2019-07-19 | Stop reason: SDUPTHER

## 2019-04-01 RX ORDER — ASPIRIN 81 MG/1
81 TABLET ORAL DAILY
Qty: 90 TABLET | Refills: 3 | Status: SHIPPED | OUTPATIENT
Start: 2019-04-01 | End: 2019-07-19 | Stop reason: SDUPTHER

## 2019-04-01 RX ORDER — ALENDRONATE SODIUM 70 MG/1
TABLET ORAL
Qty: 12 TABLET | Refills: 3 | Status: SHIPPED | OUTPATIENT
Start: 2019-04-01 | End: 2019-07-19 | Stop reason: SDUPTHER

## 2019-04-01 RX ORDER — FEXOFENADINE HCL 180 MG/1
180 TABLET ORAL DAILY
Qty: 90 TABLET | Refills: 3 | Status: SHIPPED | OUTPATIENT
Start: 2019-04-01 | End: 2019-07-19 | Stop reason: SDUPTHER

## 2019-04-01 RX ADMIN — CYANOCOBALAMIN 1000 MCG: 1000 INJECTION INTRAMUSCULAR; SUBCUTANEOUS at 12:24

## 2019-04-01 ASSESSMENT — ENCOUNTER SYMPTOMS
RESPIRATORY NEGATIVE: 1
GASTROINTESTINAL NEGATIVE: 1

## 2019-04-01 NOTE — PROGRESS NOTES
Chief Complaint   Patient presents with    Follow-up     4 months    Finger Pain     left hand 2 nd digit. Have you seen any other physician or provider since your last visit no    Have you had any other diagnostic tests since your last visit? no    Have you changed or stopped any medications since your last visit? No  Goals      Blood Pressure < 140/90      HEMOGLOBIN A1C < 8.0      LDL CALC < 100         Patient is here for a 4 month follow up on medication management. She needs refills on all her medication sent to [facility]. She is also requesting Diflucan sent to [facility] due to yeast infection. Patient is wanting to have her finger on her left hand looked at. It has been several months since she broke it and it is swollen in the joint and difficult to bend.

## 2019-04-01 NOTE — PROGRESS NOTES
SUBJECTIVE:    Patient ID: Abdoul Clifton is a 69 y.o. female. Chief Complaint   Patient presents with    Follow-up     4 months    Finger Pain     left hand 2 nd digit. HPI:  She presents for follow up on her diabetes hypertension hypothyroid. She also wants to have her left hand 2nd finger evaluated. She had fractured this and she's been taking it she still have a lot of swelling and pain and decreased movement. She says she checks her blood sugar occasionally. She denies any hypoglycemic episodes. She is  taking metformin and Jardiance. She denies any side effects. She's taken all of her other medications without any problems. Patient's medications,allergies, past medical, surgical, social and family histories were reviewed and updated as appropriate. .  Current Outpatient Medications on File Prior to Visit   Medication Sig Dispense Refill    blood glucose test strips (ACCU-CHEK TAYLA PLUS) strip use 1 three times a day 300 strip 3    ACCU-CHEK MULTICLIX LANCETS MISC Finger stick tid 100 each 3    Blood Glucose Monitoring Suppl (ACCU-CHEK TAYLA PLUS) W/DEVICE KIT 1 kit by Does not apply route 3 times daily as needed Dx:250.00 1 kit 0    calcium carbonate 600 MG TABS tablet Take 1 tablet by mouth daily. Current Facility-Administered Medications on File Prior to Visit   Medication Dose Route Frequency Provider Last Rate Last Dose    cyanocobalamin injection 1,000 mcg  1,000 mcg Intramuscular Once Sherryle Kill, DO            Review of Systems   Constitutional: Negative. HENT: Negative. Respiratory: Negative. Cardiovascular: Negative. Gastrointestinal: Negative. Genitourinary: Negative. Musculoskeletal: Positive for arthralgias (Left hand 2nd finger. ). Skin: Negative. Neurological: Negative. Psychiatric/Behavioral: Negative.         OBJECTIVE:  /88 (Site: Right Upper Arm, Position: Sitting, Cuff Size: Medium Adult)   Pulse 65   Temp 98 °F (36.7 °C) (Oral)   Resp 16   Ht 5' 2\" (1.575 m)   Wt 162 lb (73.5 kg)   SpO2 97% Comment: room air  BMI 29.63 kg/m²    Physical Exam   Constitutional: She is oriented to person, place, and time. She appears well-developed and well-nourished. HENT:   Head: Normocephalic and atraumatic. Eyes: Pupils are equal, round, and reactive to light. Conjunctivae and EOM are normal.   Neck: Normal range of motion. Neck supple. No JVD present. No thyromegaly present. Cardiovascular: Normal rate and regular rhythm. Exam reveals no gallop and no friction rub. No murmur heard. Pulmonary/Chest: Effort normal and breath sounds normal. No respiratory distress. She has no wheezes. She has no rales. Abdominal: Soft. Bowel sounds are normal. She exhibits no distension. There is no tenderness. There is no guarding. Musculoskeletal: She exhibits no edema or tenderness. Hands:  Neurological: She is alert and oriented to person, place, and time. Skin: Skin is warm and dry. No rash noted. Psychiatric: She has a normal mood and affect. Judgment normal.   Nursing note and vitals reviewed.       Results in Past 30 Days  Result Component Current Result Ref Range Previous Result Ref Range   Alb 4.5 (4/1/2019) 3.4 - 4.8 g/dL Not in Time Range    Albumin/Globulin Ratio 1.9 (4/1/2019) 0.8 - 2.0 Not in Time Range    Alkaline Phosphatase 130 (H) (4/1/2019) 25 - 100 U/L Not in Time Range    ALT 16 (4/1/2019) 4 - 36 U/L Not in Time Range    AST 14 (4/1/2019) 8 - 33 U/L Not in Time Range    BUN 15 (4/1/2019) 6 - 20 mg/dL Not in Time Range    Calcium 9.4 (4/1/2019) 8.5 - 10.5 mg/dL Not in Time Range    Chloride 106 (4/1/2019) 98 - 107 mmol/L Not in Time Range    CO2 26 (4/1/2019) 20 - 30 mmol/L Not in Time Range    CREATININE 0.9 (4/1/2019) 0.4 - 1.2 mg/dL Not in Time Range    GFR  >59 (4/1/2019) >59 Not in Time Range    GFR Non- >60 (4/1/2019) >59 Not in Time Range    Globulin 2.4 (4/1/2019) g/dL Not in Time Range    Glucose 116 (H) (4/1/2019) 74 - 106 mg/dL Not in Time Range    Potassium 4.2 (4/1/2019) 3.4 - 5.1 mmol/L Not in Time Range    Sodium 142 (4/1/2019) 136 - 145 mmol/L Not in Time Range    Total Bilirubin 0.4 (4/1/2019) 0.3 - 1.2 mg/dL Not in Time Range    Total Protein 6.9 (4/1/2019) 6.4 - 8.3 g/dL Not in Time Range        Hemoglobin A1C (%)   Date Value   04/01/2019 6.6 (H)     Microalbumin, Random Urine (mg/dL)   Date Value   04/01/2019 <1.20     LDL Calculated (mg/dL)   Date Value   03/02/2018 99         Lab Results   Component Value Date    WBC 8.1 04/01/2019    NEUTROABS 3.4 04/07/2015    HGB 13.4 04/01/2019    HCT 42.7 04/01/2019    MCV 92.6 04/01/2019     04/01/2019       Lab Results   Component Value Date    TSH 2.97 07/23/2018         ASSESSMENT/PLAN:     Margarito Dennis was seen today for follow-up and finger pain. Diagnoses and all orders for this visit:    Type 2 diabetes mellitus without complication, without long-term current use of insulin (HCC)  -     metFORMIN (GLUCOPHAGE) 500 MG tablet; Take 1 tablet by mouth three times daily  -     empagliflozin (JARDIANCE) 10 MG tablet; Take 1 tablet by mouth daily  -     Microalbumin / creatinine urine ratio; Future  -     Hemoglobin A1C; Future  -     Comprehensive Metabolic Panel; Future  -     CBC; Future  Diabetes is well-controlled continue on her current medications. Essential hypertension  -     lisinopril (PRINIVIL;ZESTRIL) 10 MG tablet; Take 1 tablet by mouth daily  -     aspirin 81 MG EC tablet; Take 1 tablet by mouth daily    Acquired hypothyroidism  -     levothyroxine (SYNTHROID) 25 MCG tablet; take 1 tablet by mouth once daily    Depression, unspecified depression type  -     escitalopram (LEXAPRO) 10 MG tablet; take 1 tablet by mouth once daily    Vitamin D deficiency  -     ergocalciferol (DRISDOL) 00737 units capsule; Take 1 capsule by mouth once a week 90 day supply  -     Vitamin D 25 Hydroxy;  Future    Pure atenolol (TENORMIN) 50 MG tablet REORDER    aspirin 81 MG EC tablet REORDER    alendronate (FOSAMAX) 70 MG tablet REORDER

## 2019-04-02 ENCOUNTER — TELEPHONE (OUTPATIENT)
Dept: PRIMARY CARE CLINIC | Age: 74
End: 2019-04-02

## 2019-04-02 RX ORDER — FLUCONAZOLE 100 MG/1
100 TABLET ORAL DAILY
Qty: 3 TABLET | Refills: 0 | Status: SHIPPED | OUTPATIENT
Start: 2019-04-02 | End: 2019-04-05

## 2019-04-02 NOTE — TELEPHONE ENCOUNTER
----- Message from MICKI Marquez sent at 4/1/2019  6:15 PM EDT -----  Healing. Needs a to come in for a splint and taping. Stop taping to the other finger to due swelling of ring finger.

## 2019-04-30 ENCOUNTER — NURSE ONLY (OUTPATIENT)
Dept: PRIMARY CARE CLINIC | Age: 74
End: 2019-04-30
Payer: MEDICARE

## 2019-04-30 DIAGNOSIS — E53.8 VITAMIN B12 DEFICIENCY: Primary | ICD-10-CM

## 2019-04-30 PROCEDURE — 96372 THER/PROPH/DIAG INJ SC/IM: CPT | Performed by: NURSE PRACTITIONER

## 2019-04-30 RX ORDER — CYANOCOBALAMIN 1000 UG/ML
1000 INJECTION INTRAMUSCULAR; SUBCUTANEOUS ONCE
Status: COMPLETED | OUTPATIENT
Start: 2019-04-30 | End: 2019-04-30

## 2019-04-30 RX ADMIN — CYANOCOBALAMIN 1000 MCG: 1000 INJECTION INTRAMUSCULAR; SUBCUTANEOUS at 11:20

## 2019-04-30 NOTE — PROGRESS NOTES
After obtaining consent, and per orders of Constantino Williamson, injection of B12 given in Left arm by Parish Sigala. Patient instructed to remain in clinic for 20 minutes afterwards, and to report any adverse reaction to me immediately.

## 2019-05-31 ENCOUNTER — NURSE ONLY (OUTPATIENT)
Dept: PRIMARY CARE CLINIC | Age: 74
End: 2019-05-31
Payer: MEDICARE

## 2019-05-31 DIAGNOSIS — E53.8 VITAMIN B12 DEFICIENCY: Primary | ICD-10-CM

## 2019-05-31 PROCEDURE — 96372 THER/PROPH/DIAG INJ SC/IM: CPT | Performed by: NURSE PRACTITIONER

## 2019-05-31 RX ORDER — CYANOCOBALAMIN 1000 UG/ML
1000 INJECTION INTRAMUSCULAR; SUBCUTANEOUS ONCE
Status: COMPLETED | OUTPATIENT
Start: 2019-05-31 | End: 2019-05-31

## 2019-05-31 RX ADMIN — CYANOCOBALAMIN 1000 MCG: 1000 INJECTION INTRAMUSCULAR; SUBCUTANEOUS at 11:53

## 2019-05-31 NOTE — PROGRESS NOTES
Administrations This Visit     cyanocobalamin injection 1,000 mcg     Admin Date  05/31/2019  11:53 Action  Given Dose  1000 mcg Route  Intramuscular Site  Other Administered By  [facility]    Ordering Provider:  MICKI Jackson    NDC:  3825-8839-86    Lot#:  [id number]    :  [employer]    Patient Supplied?:  No

## 2019-06-28 ENCOUNTER — NURSE ONLY (OUTPATIENT)
Dept: PRIMARY CARE CLINIC | Age: 74
End: 2019-06-28
Payer: MEDICARE

## 2019-06-28 DIAGNOSIS — E53.8 VITAMIN B12 DEFICIENCY: Primary | ICD-10-CM

## 2019-06-28 PROCEDURE — 96372 THER/PROPH/DIAG INJ SC/IM: CPT | Performed by: NURSE PRACTITIONER

## 2019-06-28 RX ORDER — CYANOCOBALAMIN 1000 UG/ML
1000 INJECTION INTRAMUSCULAR; SUBCUTANEOUS ONCE
Status: COMPLETED | OUTPATIENT
Start: 2019-06-28 | End: 2019-06-28

## 2019-06-28 RX ADMIN — CYANOCOBALAMIN 1000 MCG: 1000 INJECTION INTRAMUSCULAR; SUBCUTANEOUS at 11:03

## 2019-07-19 DIAGNOSIS — I10 ESSENTIAL HYPERTENSION: ICD-10-CM

## 2019-07-19 DIAGNOSIS — E11.9 TYPE 2 DIABETES MELLITUS WITHOUT COMPLICATION, WITHOUT LONG-TERM CURRENT USE OF INSULIN (HCC): ICD-10-CM

## 2019-07-19 DIAGNOSIS — Z00.00 MEDICARE ANNUAL WELLNESS VISIT, INITIAL: ICD-10-CM

## 2019-07-19 DIAGNOSIS — J30.9 ALLERGIC RHINITIS, UNSPECIFIED SEASONALITY, UNSPECIFIED TRIGGER: ICD-10-CM

## 2019-07-19 DIAGNOSIS — E78.00 PURE HYPERCHOLESTEROLEMIA: ICD-10-CM

## 2019-07-19 DIAGNOSIS — E03.9 ACQUIRED HYPOTHYROIDISM: ICD-10-CM

## 2019-07-19 DIAGNOSIS — F32.A DEPRESSION, UNSPECIFIED DEPRESSION TYPE: ICD-10-CM

## 2019-07-19 DIAGNOSIS — K52.9 GASTROENTERITIS: ICD-10-CM

## 2019-07-19 DIAGNOSIS — E55.9 VITAMIN D DEFICIENCY: ICD-10-CM

## 2019-07-19 RX ORDER — NITROGLYCERIN 0.4 MG/1
0.4 TABLET SUBLINGUAL PRN
Qty: 25 TABLET | Refills: 3 | Status: SHIPPED | OUTPATIENT
Start: 2019-07-19 | End: 2019-11-08 | Stop reason: SDUPTHER

## 2019-07-19 RX ORDER — ATENOLOL 50 MG/1
TABLET ORAL
Qty: 90 TABLET | Refills: 3 | Status: SHIPPED | OUTPATIENT
Start: 2019-07-19 | End: 2019-11-08 | Stop reason: SDUPTHER

## 2019-07-19 RX ORDER — ESCITALOPRAM OXALATE 10 MG/1
TABLET ORAL
Qty: 90 TABLET | Refills: 3 | Status: SHIPPED | OUTPATIENT
Start: 2019-07-19 | End: 2019-11-08 | Stop reason: SDUPTHER

## 2019-07-19 RX ORDER — ERGOCALCIFEROL (VITAMIN D2) 1250 MCG
50000 CAPSULE ORAL WEEKLY
Qty: 12 CAPSULE | Refills: 3 | Status: SHIPPED | OUTPATIENT
Start: 2019-07-19 | End: 2019-11-08 | Stop reason: SDUPTHER

## 2019-07-19 RX ORDER — ATORVASTATIN CALCIUM 20 MG/1
20 TABLET, FILM COATED ORAL DAILY
Qty: 90 TABLET | Refills: 3 | Status: SHIPPED | OUTPATIENT
Start: 2019-07-19 | End: 2019-11-08 | Stop reason: SDUPTHER

## 2019-07-19 RX ORDER — LANCETS
EACH MISCELLANEOUS
Qty: 100 EACH | Refills: 3 | Status: SHIPPED | OUTPATIENT
Start: 2019-07-19 | End: 2019-11-08 | Stop reason: SDUPTHER

## 2019-07-19 RX ORDER — ASPIRIN 81 MG/1
81 TABLET ORAL DAILY
Qty: 90 TABLET | Refills: 3 | Status: SHIPPED | OUTPATIENT
Start: 2019-07-19 | End: 2019-11-08 | Stop reason: SDUPTHER

## 2019-07-19 RX ORDER — ESOMEPRAZOLE MAGNESIUM 40 MG/1
CAPSULE, DELAYED RELEASE ORAL
Qty: 90 CAPSULE | Refills: 3 | Status: SHIPPED | OUTPATIENT
Start: 2019-07-19 | End: 2019-11-08 | Stop reason: SDUPTHER

## 2019-07-19 RX ORDER — FEXOFENADINE HCL 180 MG/1
180 TABLET ORAL DAILY
Qty: 90 TABLET | Refills: 3 | Status: SHIPPED | OUTPATIENT
Start: 2019-07-19 | End: 2019-11-08 | Stop reason: SDUPTHER

## 2019-07-19 RX ORDER — MONTELUKAST SODIUM 10 MG/1
TABLET ORAL
Qty: 90 TABLET | Refills: 3 | Status: SHIPPED | OUTPATIENT
Start: 2019-07-19 | End: 2019-11-08 | Stop reason: SDUPTHER

## 2019-07-19 RX ORDER — ALENDRONATE SODIUM 70 MG/1
TABLET ORAL
Qty: 12 TABLET | Refills: 3 | Status: SHIPPED | OUTPATIENT
Start: 2019-07-19 | End: 2019-11-08 | Stop reason: SDUPTHER

## 2019-07-19 RX ORDER — LISINOPRIL 10 MG/1
10 TABLET ORAL DAILY
Qty: 90 TABLET | Refills: 3 | Status: SHIPPED | OUTPATIENT
Start: 2019-07-19 | End: 2019-10-02 | Stop reason: DRUGHIGH

## 2019-07-19 RX ORDER — FLUTICASONE PROPIONATE 50 MCG
SPRAY, SUSPENSION (ML) NASAL
Qty: 16 G | Refills: 3 | Status: SHIPPED | OUTPATIENT
Start: 2019-07-19 | End: 2019-11-08 | Stop reason: SDUPTHER

## 2019-07-19 RX ORDER — LEVOTHYROXINE SODIUM 0.03 MG/1
TABLET ORAL
Qty: 90 TABLET | Refills: 3 | Status: SHIPPED | OUTPATIENT
Start: 2019-07-19 | End: 2019-07-29 | Stop reason: SDUPTHER

## 2019-07-29 ENCOUNTER — OFFICE VISIT (OUTPATIENT)
Dept: PRIMARY CARE CLINIC | Age: 74
End: 2019-07-29
Payer: MEDICARE

## 2019-07-29 ENCOUNTER — HOSPITAL ENCOUNTER (OUTPATIENT)
Facility: HOSPITAL | Age: 74
Discharge: HOME | End: 2019-07-29
Payer: MEDICARE

## 2019-07-29 VITALS
TEMPERATURE: 98 F | DIASTOLIC BLOOD PRESSURE: 80 MMHG | OXYGEN SATURATION: 98 % | SYSTOLIC BLOOD PRESSURE: 155 MMHG | HEART RATE: 72 BPM | HEIGHT: 62 IN | BODY MASS INDEX: 30.55 KG/M2 | WEIGHT: 166 LBS

## 2019-07-29 DIAGNOSIS — E11.9 TYPE 2 DIABETES MELLITUS WITHOUT COMPLICATION, WITHOUT LONG-TERM CURRENT USE OF INSULIN (HCC): ICD-10-CM

## 2019-07-29 DIAGNOSIS — I15.9 SECONDARY HYPERTENSION: Primary | ICD-10-CM

## 2019-07-29 DIAGNOSIS — E55.9 VITAMIN D DEFICIENCY: ICD-10-CM

## 2019-07-29 DIAGNOSIS — E03.9 ACQUIRED HYPOTHYROIDISM: ICD-10-CM

## 2019-07-29 DIAGNOSIS — E78.00 PURE HYPERCHOLESTEROLEMIA: ICD-10-CM

## 2019-07-29 DIAGNOSIS — B37.9 YEAST INFECTION: ICD-10-CM

## 2019-07-29 LAB
A/G RATIO: 2.3 (ref 0.8–2)
ALBUMIN SERPL-MCNC: 4.6 G/DL (ref 3.4–4.8)
ALP BLD-CCNC: 121 U/L (ref 25–100)
ALT SERPL-CCNC: 15 U/L (ref 4–36)
ANION GAP SERPL CALCULATED.3IONS-SCNC: 11 MMOL/L (ref 3–16)
APPEARANCE FLUID: CLEAR
AST SERPL-CCNC: 14 U/L (ref 8–33)
BILIRUB SERPL-MCNC: 0.4 MG/DL (ref 0.3–1.2)
BILIRUBIN, POC: ABNORMAL
BLOOD URINE, POC: ABNORMAL
BUN BLDV-MCNC: 20 MG/DL (ref 6–20)
CALCIUM SERPL-MCNC: 9.8 MG/DL (ref 8.5–10.5)
CHLORIDE BLD-SCNC: 104 MMOL/L (ref 98–107)
CHOLESTEROL, TOTAL: 154 MG/DL (ref 0–200)
CLARITY, POC: CLEAR
CO2: 29 MMOL/L (ref 20–30)
COLOR, POC: YELLOW
CREAT SERPL-MCNC: 0.9 MG/DL (ref 0.4–1.2)
FOLATE: 12.35 NG/ML
GFR AFRICAN AMERICAN: >59
GFR NON-AFRICAN AMERICAN: >60
GLOBULIN: 2 G/DL
GLUCOSE BLD-MCNC: 107 MG/DL (ref 74–106)
GLUCOSE URINE, POC: ABNORMAL
HBA1C MFR BLD: 6.9 %
HCT VFR BLD CALC: 40.7 % (ref 37–47)
HDLC SERPL-MCNC: 53 MG/DL (ref 40–60)
HEMOGLOBIN: 12.8 G/DL (ref 11.5–16.5)
KETONES, POC: ABNORMAL
LDL CHOLESTEROL CALCULATED: 72 MG/DL
LEUKOCYTE EST, POC: ABNORMAL
MCH RBC QN AUTO: 29.1 PG (ref 27–32)
MCHC RBC AUTO-ENTMCNC: 31.4 G/DL (ref 31–35)
MCV RBC AUTO: 92.5 FL (ref 80–100)
NITRITE, POC: ABNORMAL
PDW BLD-RTO: 12.9 % (ref 11–16)
PH, POC: 5
PLATELET # BLD: 222 K/UL (ref 150–400)
PMV BLD AUTO: 11.4 FL (ref 6–10)
POTASSIUM SERPL-SCNC: 4.3 MMOL/L (ref 3.4–5.1)
PROTEIN, POC: ABNORMAL
RBC # BLD: 4.4 M/UL (ref 3.8–5.8)
SODIUM BLD-SCNC: 144 MMOL/L (ref 136–145)
SPECIFIC GRAVITY, POC: 1
TOTAL PROTEIN: 6.6 G/DL (ref 6.4–8.3)
TRIGL SERPL-MCNC: 145 MG/DL (ref 0–249)
TSH SERPL DL<=0.05 MIU/L-ACNC: 5.28 UIU/ML (ref 0.35–5.5)
UROBILINOGEN, POC: 0.2
VITAMIN B-12: 309 PG/ML (ref 211–911)
VITAMIN D 25-HYDROXY: 23.8 (ref 32–100)
VLDLC SERPL CALC-MCNC: 29 MG/DL
WBC # BLD: 6.9 K/UL (ref 4–11)

## 2019-07-29 PROCEDURE — 1036F TOBACCO NON-USER: CPT | Performed by: NURSE PRACTITIONER

## 2019-07-29 PROCEDURE — 81002 URINALYSIS NONAUTO W/O SCOPE: CPT | Performed by: NURSE PRACTITIONER

## 2019-07-29 PROCEDURE — 82746 ASSAY OF FOLIC ACID SERUM: CPT

## 2019-07-29 PROCEDURE — 84443 ASSAY THYROID STIM HORMONE: CPT

## 2019-07-29 PROCEDURE — 82607 VITAMIN B-12: CPT

## 2019-07-29 PROCEDURE — 1123F ACP DISCUSS/DSCN MKR DOCD: CPT | Performed by: NURSE PRACTITIONER

## 2019-07-29 PROCEDURE — 83036 HEMOGLOBIN GLYCOSYLATED A1C: CPT

## 2019-07-29 PROCEDURE — 3044F HG A1C LEVEL LT 7.0%: CPT | Performed by: NURSE PRACTITIONER

## 2019-07-29 PROCEDURE — 80061 LIPID PANEL: CPT

## 2019-07-29 PROCEDURE — G8427 DOCREV CUR MEDS BY ELIG CLIN: HCPCS | Performed by: NURSE PRACTITIONER

## 2019-07-29 PROCEDURE — 4040F PNEUMOC VAC/ADMIN/RCVD: CPT | Performed by: NURSE PRACTITIONER

## 2019-07-29 PROCEDURE — 82306 VITAMIN D 25 HYDROXY: CPT

## 2019-07-29 PROCEDURE — 85027 COMPLETE CBC AUTOMATED: CPT

## 2019-07-29 PROCEDURE — 80053 COMPREHEN METABOLIC PANEL: CPT

## 2019-07-29 PROCEDURE — 2022F DILAT RTA XM EVC RTNOPTHY: CPT | Performed by: NURSE PRACTITIONER

## 2019-07-29 PROCEDURE — 1090F PRES/ABSN URINE INCON ASSESS: CPT | Performed by: NURSE PRACTITIONER

## 2019-07-29 PROCEDURE — G8417 CALC BMI ABV UP PARAM F/U: HCPCS | Performed by: NURSE PRACTITIONER

## 2019-07-29 PROCEDURE — G8399 PT W/DXA RESULTS DOCUMENT: HCPCS | Performed by: NURSE PRACTITIONER

## 2019-07-29 PROCEDURE — 99214 OFFICE O/P EST MOD 30 MIN: CPT | Performed by: NURSE PRACTITIONER

## 2019-07-29 PROCEDURE — 3017F COLORECTAL CA SCREEN DOC REV: CPT | Performed by: NURSE PRACTITIONER

## 2019-07-29 RX ORDER — LEVOTHYROXINE SODIUM 0.03 MG/1
TABLET ORAL
Qty: 90 TABLET | Refills: 3 | Status: SHIPPED | OUTPATIENT
Start: 2019-07-29 | End: 2019-11-08 | Stop reason: SDUPTHER

## 2019-07-29 RX ORDER — FLUCONAZOLE 150 MG/1
150 TABLET ORAL
Qty: 2 TABLET | Refills: 0 | Status: SHIPPED | OUTPATIENT
Start: 2019-07-29 | End: 2019-09-19 | Stop reason: SDUPTHER

## 2019-07-29 RX ORDER — CLONIDINE HYDROCHLORIDE 0.1 MG/1
0.1 TABLET ORAL 2 TIMES DAILY
Status: SHIPPED | OUTPATIENT
Start: 2019-07-29

## 2019-07-29 RX ORDER — LISINOPRIL 20 MG/1
20 TABLET ORAL DAILY
Qty: 30 TABLET | Refills: 5 | Status: SHIPPED | OUTPATIENT
Start: 2019-07-29 | End: 2019-10-02 | Stop reason: SDUPTHER

## 2019-07-29 ASSESSMENT — ENCOUNTER SYMPTOMS
GASTROINTESTINAL NEGATIVE: 1
RESPIRATORY NEGATIVE: 1

## 2019-07-29 NOTE — PROGRESS NOTES
SUBJECTIVE:    Patient ID: Birdie Victoria is a 69 y.o. female. Chief Complaint   Patient presents with    Diabetes    Hyperlipidemia    Hypertension         HPI:  She is following up on her cholesterol, htn and diabetes. She has been having some headaches. Her blood pressure has been a little up. She is taking 10 mg Lisinopril and 25 mg Atenolol. She says she is having foul smelling urine and yeast infection. With itching. Patient's medications,allergies, past medical, surgical, social and family histories were reviewed and updated as appropriate. .  Current Outpatient Medications on File Prior to Visit   Medication Sig Dispense Refill    ACCU-CHEK MULTICLIX LANCETS MISC Finger stick tid 100 each 3    alendronate (FOSAMAX) 70 MG tablet take 1 tablet by mouth every week 12 tablet 3    aspirin 81 MG EC tablet Take 1 tablet by mouth daily 90 tablet 3    atenolol (TENORMIN) 50 MG tablet take 1 tablet by mouth once daily 90 tablet 3    atorvastatin (LIPITOR) 20 MG tablet Take 1 tablet by mouth daily 90 tablet 3    blood glucose test strips (ACCU-CHEK TAYLA PLUS) strip use 1 three times a day 300 strip 3    empagliflozin (JARDIANCE) 10 MG tablet Take 1 tablet by mouth daily 90 tablet 3    ergocalciferol (DRISDOL) 99476 units capsule Take 1 capsule by mouth once a week 90 day supply 12 capsule 3    escitalopram (LEXAPRO) 10 MG tablet take 1 tablet by mouth once daily 90 tablet 3    esomeprazole (NEXIUM) 40 MG delayed release capsule take 1 capsule by mouth every morning BEFORE BREAKFAST 90 capsule 3    fexofenadine (ALLEGRA) 180 MG tablet Take 1 tablet by mouth daily 90 tablet 3    fluticasone (FLONASE) 50 MCG/ACT nasal spray instill 2 sprays into each nostril once daily.  90 day supply 16 g 3    lisinopril (PRINIVIL;ZESTRIL) 10 MG tablet Take 1 tablet by mouth daily 90 tablet 3    metFORMIN (GLUCOPHAGE) 500 MG tablet Take 1 tablet by mouth three times daily 270 tablet 3    montelukast Future    Type 2 diabetes mellitus without complication, without long-term current use of insulin (HCC)  -     Hemoglobin A1C; Future    Acquired hypothyroidism  -     TSH without Reflex; Future  -     levothyroxine (SYNTHROID) 25 MCG tablet; take 1 tablet by mouth once daily    Vitamin D deficiency  -     Vitamin D 25 Hydroxy; Future  -     Vitamin B12 & Folate; Future    Yeast infection  -     fluconazole (DIFLUCAN) 150 MG tablet;  Take 1 tablet by mouth every 72 hours for 4 days  -     POCT Urinalysis no Micro    Other orders  -     cloNIDine (CATAPRES) tablet 0.1 mg      Medications Discontinued During This Encounter   Medication Reason    levothyroxine (SYNTHROID) 25 MCG tablet REORDER

## 2019-08-26 ENCOUNTER — NURSE ONLY (OUTPATIENT)
Dept: PRIMARY CARE CLINIC | Age: 74
End: 2019-08-26
Payer: MEDICARE

## 2019-08-26 DIAGNOSIS — E53.8 B12 DEFICIENCY: Primary | ICD-10-CM

## 2019-08-26 PROCEDURE — 96372 THER/PROPH/DIAG INJ SC/IM: CPT | Performed by: NURSE PRACTITIONER

## 2019-08-26 RX ORDER — CYANOCOBALAMIN 1000 UG/ML
1000 INJECTION INTRAMUSCULAR; SUBCUTANEOUS ONCE
Status: COMPLETED | OUTPATIENT
Start: 2019-08-26 | End: 2019-08-26

## 2019-08-26 RX ADMIN — CYANOCOBALAMIN 1000 MCG: 1000 INJECTION INTRAMUSCULAR; SUBCUTANEOUS at 11:49

## 2019-08-30 ENCOUNTER — OFFICE VISIT (OUTPATIENT)
Dept: PRIMARY CARE CLINIC | Age: 74
End: 2019-08-30
Payer: MEDICARE

## 2019-08-30 VITALS
HEART RATE: 73 BPM | SYSTOLIC BLOOD PRESSURE: 130 MMHG | DIASTOLIC BLOOD PRESSURE: 80 MMHG | OXYGEN SATURATION: 97 % | BODY MASS INDEX: 29.59 KG/M2 | WEIGHT: 161.8 LBS

## 2019-08-30 DIAGNOSIS — E11.9 TYPE 2 DIABETES MELLITUS WITHOUT COMPLICATION, WITHOUT LONG-TERM CURRENT USE OF INSULIN (HCC): ICD-10-CM

## 2019-08-30 DIAGNOSIS — J01.00 ACUTE NON-RECURRENT MAXILLARY SINUSITIS: ICD-10-CM

## 2019-08-30 DIAGNOSIS — J20.9 ACUTE BRONCHITIS, UNSPECIFIED ORGANISM: Primary | ICD-10-CM

## 2019-08-30 PROCEDURE — 3017F COLORECTAL CA SCREEN DOC REV: CPT | Performed by: NURSE PRACTITIONER

## 2019-08-30 PROCEDURE — 1036F TOBACCO NON-USER: CPT | Performed by: NURSE PRACTITIONER

## 2019-08-30 PROCEDURE — 4040F PNEUMOC VAC/ADMIN/RCVD: CPT | Performed by: NURSE PRACTITIONER

## 2019-08-30 PROCEDURE — G8417 CALC BMI ABV UP PARAM F/U: HCPCS | Performed by: NURSE PRACTITIONER

## 2019-08-30 PROCEDURE — G8427 DOCREV CUR MEDS BY ELIG CLIN: HCPCS | Performed by: NURSE PRACTITIONER

## 2019-08-30 PROCEDURE — 2022F DILAT RTA XM EVC RTNOPTHY: CPT | Performed by: NURSE PRACTITIONER

## 2019-08-30 PROCEDURE — 99213 OFFICE O/P EST LOW 20 MIN: CPT | Performed by: NURSE PRACTITIONER

## 2019-08-30 PROCEDURE — 3044F HG A1C LEVEL LT 7.0%: CPT | Performed by: NURSE PRACTITIONER

## 2019-08-30 PROCEDURE — 1090F PRES/ABSN URINE INCON ASSESS: CPT | Performed by: NURSE PRACTITIONER

## 2019-08-30 PROCEDURE — G8399 PT W/DXA RESULTS DOCUMENT: HCPCS | Performed by: NURSE PRACTITIONER

## 2019-08-30 PROCEDURE — 1123F ACP DISCUSS/DSCN MKR DOCD: CPT | Performed by: NURSE PRACTITIONER

## 2019-08-30 RX ORDER — FLUCONAZOLE 150 MG/1
150 TABLET ORAL EVERY OTHER DAY
Qty: 5 TABLET | Refills: 0 | Status: SHIPPED | OUTPATIENT
Start: 2019-08-30 | End: 2019-10-02 | Stop reason: ALTCHOICE

## 2019-08-30 RX ORDER — CEFDINIR 300 MG/1
300 CAPSULE ORAL 2 TIMES DAILY
Qty: 20 CAPSULE | Refills: 0 | Status: SHIPPED | OUTPATIENT
Start: 2019-08-30 | End: 2019-09-09

## 2019-08-30 ASSESSMENT — ENCOUNTER SYMPTOMS
NAUSEA: 0
VOMITING: 0
EYE PAIN: 0
SORE THROAT: 0
ABDOMINAL PAIN: 0
COUGH: 1
SHORTNESS OF BREATH: 0

## 2019-09-12 NOTE — PROGRESS NOTES
Have you seen any other physician or provider since your last visit? no    Have you had any other diagnostic tests since your last visit? no    Have you changed or stopped any medications since your last visit including any over-the-counter medicines, vitamins, or herbal medicines? no     Are you taking all your prescribed medications? Yes  If NO, why? -  N/A      REVIEW OF SYSTEMS:  Review of Systems   Constitutional: Negative for chills and fever. HENT: Positive for congestion. Negative for ear pain and sore throat. Eyes: Negative for pain and visual disturbance. Respiratory: Positive for cough. Negative for shortness of breath. Cardiovascular: Negative for chest pain, palpitations and leg swelling. Gastrointestinal: Negative for abdominal pain, nausea and vomiting. Genitourinary: Negative for dysuria and hematuria. Musculoskeletal: Negative for joint swelling. Skin: Negative for rash. Neurological: Negative for dizziness and weakness. Psychiatric/Behavioral: Negative for sleep disturbance.
results found for requested labs within last 30 days. Hemoglobin A1C (%)   Date Value   07/29/2019 6.9 (H)     Microalbumin, Random Urine (mg/dL)   Date Value   04/01/2019 <1.20     LDL Calculated (mg/dL)   Date Value   07/29/2019 72         Lab Results   Component Value Date    WBC 6.9 07/29/2019    NEUTROABS 3.4 04/07/2015    HGB 12.8 07/29/2019    HCT 40.7 07/29/2019    MCV 92.5 07/29/2019     07/29/2019       Lab Results   Component Value Date    TSH 5.28 07/29/2019       Prior to Visit Medications    Medication Sig Taking? Authorizing Provider   fluconazole (DIFLUCAN) 150 MG tablet Take 1 tablet by mouth every other day May repeat in 3-5 days, if symptoms persist or recur.  Yes MICKI Valle   lisinopril (PRINIVIL;ZESTRIL) 20 MG tablet Take 1 tablet by mouth daily Yes MICKI Car   levothyroxine (SYNTHROID) 25 MCG tablet take 1 tablet by mouth once daily Yes MICKI Car   ACCU-CHEK MULTICLIX LANCETS MISC Finger stick tid Yes MICKI Car   alendronate (FOSAMAX) 70 MG tablet take 1 tablet by mouth every week Yes MICKI Car   aspirin 81 MG EC tablet Take 1 tablet by mouth daily Yes MICKI Car   atenolol (TENORMIN) 50 MG tablet take 1 tablet by mouth once daily Yes MICKI Car   atorvastatin (LIPITOR) 20 MG tablet Take 1 tablet by mouth daily Yes MICKI Car   blood glucose test strips (ACCU-CHEK TAYLA PLUS) strip use 1 three times a day Yes MICKI Car   empagliflozin (JARDIANCE) 10 MG tablet Take 1 tablet by mouth daily Yes MICKI Car   ergocalciferol (DRISDOL) 68934 units capsule Take 1 capsule by mouth once a week 90 day supply Yes MICKI Car   escitalopram (LEXAPRO) 10 MG tablet take 1 tablet by mouth once daily Yes MICKI Car   esomeprazole (NEXIUM) 40 MG delayed release capsule take 1 capsule by mouth every morning BEFORE BREAKFAST Yes MICKI Car   fexofenadine

## 2019-09-19 ENCOUNTER — OFFICE VISIT (OUTPATIENT)
Dept: PRIMARY CARE CLINIC | Age: 74
End: 2019-09-19
Payer: MEDICARE

## 2019-09-19 VITALS
OXYGEN SATURATION: 98 % | TEMPERATURE: 98.2 F | WEIGHT: 161 LBS | HEART RATE: 74 BPM | BODY MASS INDEX: 29.45 KG/M2 | DIASTOLIC BLOOD PRESSURE: 80 MMHG | SYSTOLIC BLOOD PRESSURE: 140 MMHG

## 2019-09-19 DIAGNOSIS — J20.9 ACUTE BRONCHITIS, UNSPECIFIED ORGANISM: Primary | ICD-10-CM

## 2019-09-19 DIAGNOSIS — B37.9 YEAST INFECTION: ICD-10-CM

## 2019-09-19 PROCEDURE — G8417 CALC BMI ABV UP PARAM F/U: HCPCS | Performed by: NURSE PRACTITIONER

## 2019-09-19 PROCEDURE — 99213 OFFICE O/P EST LOW 20 MIN: CPT | Performed by: NURSE PRACTITIONER

## 2019-09-19 PROCEDURE — G8399 PT W/DXA RESULTS DOCUMENT: HCPCS | Performed by: NURSE PRACTITIONER

## 2019-09-19 PROCEDURE — 3017F COLORECTAL CA SCREEN DOC REV: CPT | Performed by: NURSE PRACTITIONER

## 2019-09-19 PROCEDURE — 1123F ACP DISCUSS/DSCN MKR DOCD: CPT | Performed by: NURSE PRACTITIONER

## 2019-09-19 PROCEDURE — 4040F PNEUMOC VAC/ADMIN/RCVD: CPT | Performed by: NURSE PRACTITIONER

## 2019-09-19 PROCEDURE — G8427 DOCREV CUR MEDS BY ELIG CLIN: HCPCS | Performed by: NURSE PRACTITIONER

## 2019-09-19 PROCEDURE — 1036F TOBACCO NON-USER: CPT | Performed by: NURSE PRACTITIONER

## 2019-09-19 PROCEDURE — 1090F PRES/ABSN URINE INCON ASSESS: CPT | Performed by: NURSE PRACTITIONER

## 2019-09-19 RX ORDER — FLUCONAZOLE 150 MG/1
150 TABLET ORAL EVERY OTHER DAY
Qty: 5 TABLET | Refills: 0 | Status: SHIPPED | OUTPATIENT
Start: 2019-09-19 | End: 2019-09-29

## 2019-09-19 RX ORDER — LEVOFLOXACIN 500 MG/1
500 TABLET, FILM COATED ORAL DAILY
Qty: 10 TABLET | Refills: 0 | Status: SHIPPED | OUTPATIENT
Start: 2019-09-19 | End: 2019-09-29

## 2019-09-19 ASSESSMENT — ENCOUNTER SYMPTOMS
SORE THROAT: 0
VOMITING: 0
COUGH: 1
EYE PAIN: 0
ABDOMINAL PAIN: 0
NAUSEA: 0
SHORTNESS OF BREATH: 0

## 2019-09-20 ENCOUNTER — TELEPHONE (OUTPATIENT)
Dept: PRIMARY CARE CLINIC | Age: 74
End: 2019-09-20

## 2019-09-29 NOTE — PROGRESS NOTES
Microalbumin, Random Urine (mg/dL)   Date Value   04/01/2019 <1.20     LDL Calculated (mg/dL)   Date Value   07/29/2019 72         Lab Results   Component Value Date    WBC 6.9 07/29/2019    NEUTROABS 3.4 04/07/2015    HGB 12.8 07/29/2019    HCT 40.7 07/29/2019    MCV 92.5 07/29/2019     07/29/2019       Lab Results   Component Value Date    TSH 5.28 07/29/2019       Prior to Visit Medications    Medication Sig Taking? Authorizing Provider   fluconazole (DIFLUCAN) 150 MG tablet Take 1 tablet by mouth every other day for 10 days Yes MICKI Ward   levofloxacin (LEVAQUIN) 500 MG tablet Take 1 tablet by mouth daily for 10 days Yes MICKI Ward   fluconazole (DIFLUCAN) 150 MG tablet Take 1 tablet by mouth every other day May repeat in 3-5 days, if symptoms persist or recur.  Yes MICKI Ward   lisinopril (PRINIVIL;ZESTRIL) 20 MG tablet Take 1 tablet by mouth daily Yes MICKI Metzger   levothyroxine (SYNTHROID) 25 MCG tablet take 1 tablet by mouth once daily Yes MICKI Metzger   ACCU-CHEK MULTICLIX LANCETS MISC Finger stick tid Yes MICKI Metzger   alendronate (FOSAMAX) 70 MG tablet take 1 tablet by mouth every week Yes MICKI Metzger   aspirin 81 MG EC tablet Take 1 tablet by mouth daily Yes MICKI Metzger   atenolol (TENORMIN) 50 MG tablet take 1 tablet by mouth once daily Yes MICKI Metzger   atorvastatin (LIPITOR) 20 MG tablet Take 1 tablet by mouth daily Yes MICKI Metzger   blood glucose test strips (ACCU-CHEK TAYLA PLUS) strip use 1 three times a day Yes MICKI Metzger   empagliflozin (JARDIANCE) 10 MG tablet Take 1 tablet by mouth daily Yes MICKI Metzger   ergocalciferol (DRISDOL) 75247 units capsule Take 1 capsule by mouth once a week 90 day supply Yes MICKI Metzger   escitalopram (LEXAPRO) 10 MG tablet take 1 tablet by mouth once daily Yes MICIK Metzger   esomeprazole ([address]) 40 MG delayed release

## 2019-09-30 ENCOUNTER — NURSE ONLY (OUTPATIENT)
Dept: PRIMARY CARE CLINIC | Age: 74
End: 2019-09-30
Payer: MEDICARE

## 2019-09-30 DIAGNOSIS — E53.8 B12 DEFICIENCY: Primary | ICD-10-CM

## 2019-09-30 PROCEDURE — 96372 THER/PROPH/DIAG INJ SC/IM: CPT | Performed by: NURSE PRACTITIONER

## 2019-09-30 RX ORDER — CYANOCOBALAMIN 1000 UG/ML
1000 INJECTION INTRAMUSCULAR; SUBCUTANEOUS ONCE
Status: COMPLETED | OUTPATIENT
Start: 2019-09-30 | End: 2019-09-30

## 2019-09-30 RX ADMIN — CYANOCOBALAMIN 1000 MCG: 1000 INJECTION INTRAMUSCULAR; SUBCUTANEOUS at 11:05

## 2019-10-02 ENCOUNTER — OFFICE VISIT (OUTPATIENT)
Dept: PRIMARY CARE CLINIC | Age: 74
End: 2019-10-02
Payer: MEDICARE

## 2019-10-02 ENCOUNTER — HOSPITAL ENCOUNTER (OUTPATIENT)
Facility: HOSPITAL | Age: 74
Discharge: HOME | End: 2019-10-02
Payer: MEDICARE

## 2019-10-02 VITALS
HEIGHT: 62 IN | SYSTOLIC BLOOD PRESSURE: 136 MMHG | BODY MASS INDEX: 29.33 KG/M2 | OXYGEN SATURATION: 97 % | HEART RATE: 62 BPM | TEMPERATURE: 98.1 F | WEIGHT: 159.4 LBS | RESPIRATION RATE: 16 BRPM | DIASTOLIC BLOOD PRESSURE: 80 MMHG

## 2019-10-02 DIAGNOSIS — Z23 NEED FOR INFLUENZA VACCINATION: ICD-10-CM

## 2019-10-02 DIAGNOSIS — E78.00 PURE HYPERCHOLESTEROLEMIA: ICD-10-CM

## 2019-10-02 DIAGNOSIS — I15.9 SECONDARY HYPERTENSION: ICD-10-CM

## 2019-10-02 DIAGNOSIS — E11.9 TYPE 2 DIABETES MELLITUS WITHOUT COMPLICATION, WITHOUT LONG-TERM CURRENT USE OF INSULIN (HCC): Primary | ICD-10-CM

## 2019-10-02 DIAGNOSIS — B37.9 YEAST INFECTION: ICD-10-CM

## 2019-10-02 DIAGNOSIS — E11.9 TYPE 2 DIABETES MELLITUS WITHOUT COMPLICATION, WITHOUT LONG-TERM CURRENT USE OF INSULIN (HCC): ICD-10-CM

## 2019-10-02 DIAGNOSIS — E03.9 ACQUIRED HYPOTHYROIDISM: ICD-10-CM

## 2019-10-02 LAB
A/G RATIO: 1.8 (ref 0.8–2)
ALBUMIN SERPL-MCNC: 4.4 G/DL (ref 3.4–4.8)
ALP BLD-CCNC: 112 U/L (ref 25–100)
ALT SERPL-CCNC: 15 U/L (ref 4–36)
ANION GAP SERPL CALCULATED.3IONS-SCNC: 15 MMOL/L (ref 3–16)
AST SERPL-CCNC: 21 U/L (ref 8–33)
BILIRUB SERPL-MCNC: 0.3 MG/DL (ref 0.3–1.2)
BUN BLDV-MCNC: 20 MG/DL (ref 6–20)
CALCIUM SERPL-MCNC: 9.2 MG/DL (ref 8.5–10.5)
CHLORIDE BLD-SCNC: 104 MMOL/L (ref 98–107)
CO2: 25 MMOL/L (ref 20–30)
CREAT SERPL-MCNC: 0.7 MG/DL (ref 0.4–1.2)
GFR AFRICAN AMERICAN: >59
GFR NON-AFRICAN AMERICAN: >60
GLOBULIN: 2.4 G/DL
GLUCOSE BLD-MCNC: 70 MG/DL (ref 74–106)
HBA1C MFR BLD: 6.5 %
HCT VFR BLD CALC: 39.8 % (ref 37–47)
HEMOGLOBIN: 12.5 G/DL (ref 11.5–16.5)
MCH RBC QN AUTO: 28.7 PG (ref 27–32)
MCHC RBC AUTO-ENTMCNC: 31.4 G/DL (ref 31–35)
MCV RBC AUTO: 91.5 FL (ref 80–100)
PDW BLD-RTO: 12.8 % (ref 11–16)
PLATELET # BLD: 227 K/UL (ref 150–400)
PMV BLD AUTO: 11.5 FL (ref 6–10)
POTASSIUM SERPL-SCNC: 4.1 MMOL/L (ref 3.4–5.1)
RBC # BLD: 4.35 M/UL (ref 3.8–5.8)
SODIUM BLD-SCNC: 144 MMOL/L (ref 136–145)
TOTAL PROTEIN: 6.8 G/DL (ref 6.4–8.3)
TSH REFLEX: 3.46 UIU/ML (ref 0.35–5.5)
WBC # BLD: 7.7 K/UL (ref 4–11)

## 2019-10-02 PROCEDURE — G8482 FLU IMMUNIZE ORDER/ADMIN: HCPCS | Performed by: NURSE PRACTITIONER

## 2019-10-02 PROCEDURE — G8427 DOCREV CUR MEDS BY ELIG CLIN: HCPCS | Performed by: NURSE PRACTITIONER

## 2019-10-02 PROCEDURE — 90688 IIV4 VACCINE SPLT 0.5 ML IM: CPT | Performed by: NURSE PRACTITIONER

## 2019-10-02 PROCEDURE — G8417 CALC BMI ABV UP PARAM F/U: HCPCS | Performed by: NURSE PRACTITIONER

## 2019-10-02 PROCEDURE — 83036 HEMOGLOBIN GLYCOSYLATED A1C: CPT

## 2019-10-02 PROCEDURE — G8399 PT W/DXA RESULTS DOCUMENT: HCPCS | Performed by: NURSE PRACTITIONER

## 2019-10-02 PROCEDURE — 85027 COMPLETE CBC AUTOMATED: CPT

## 2019-10-02 PROCEDURE — 3017F COLORECTAL CA SCREEN DOC REV: CPT | Performed by: NURSE PRACTITIONER

## 2019-10-02 PROCEDURE — 1090F PRES/ABSN URINE INCON ASSESS: CPT | Performed by: NURSE PRACTITIONER

## 2019-10-02 PROCEDURE — 3044F HG A1C LEVEL LT 7.0%: CPT | Performed by: NURSE PRACTITIONER

## 2019-10-02 PROCEDURE — 80053 COMPREHEN METABOLIC PANEL: CPT

## 2019-10-02 PROCEDURE — 99214 OFFICE O/P EST MOD 30 MIN: CPT | Performed by: NURSE PRACTITIONER

## 2019-10-02 PROCEDURE — G0008 ADMIN INFLUENZA VIRUS VAC: HCPCS | Performed by: NURSE PRACTITIONER

## 2019-10-02 PROCEDURE — 84443 ASSAY THYROID STIM HORMONE: CPT

## 2019-10-02 PROCEDURE — 1123F ACP DISCUSS/DSCN MKR DOCD: CPT | Performed by: NURSE PRACTITIONER

## 2019-10-02 PROCEDURE — 2022F DILAT RTA XM EVC RTNOPTHY: CPT | Performed by: NURSE PRACTITIONER

## 2019-10-02 PROCEDURE — 4040F PNEUMOC VAC/ADMIN/RCVD: CPT | Performed by: NURSE PRACTITIONER

## 2019-10-02 PROCEDURE — 1036F TOBACCO NON-USER: CPT | Performed by: NURSE PRACTITIONER

## 2019-10-02 RX ORDER — ANTIARTHRITIC COMBINATION NO.2 900 MG
TABLET ORAL
COMMUNITY

## 2019-10-02 RX ORDER — LISINOPRIL 20 MG/1
20 TABLET ORAL DAILY
Qty: 90 TABLET | Refills: 3 | Status: SHIPPED | OUTPATIENT
Start: 2019-10-02 | End: 2019-11-08 | Stop reason: SDUPTHER

## 2019-10-02 ASSESSMENT — ENCOUNTER SYMPTOMS
GASTROINTESTINAL NEGATIVE: 1
RESPIRATORY NEGATIVE: 1

## 2019-10-18 ENCOUNTER — HOSPITAL ENCOUNTER (OUTPATIENT)
Dept: MAMMOGRAPHY | Facility: HOSPITAL | Age: 74
Discharge: HOME | End: 2019-10-18
Payer: MEDICARE

## 2019-10-18 DIAGNOSIS — Z12.31 BREAST CANCER SCREENING BY MAMMOGRAM: ICD-10-CM

## 2019-10-18 PROCEDURE — 77063 BREAST TOMOSYNTHESIS BI: CPT

## 2019-10-31 ENCOUNTER — NURSE ONLY (OUTPATIENT)
Dept: PRIMARY CARE CLINIC | Age: 74
End: 2019-10-31
Payer: MEDICARE

## 2019-10-31 DIAGNOSIS — E53.8 B12 DEFICIENCY: Primary | ICD-10-CM

## 2019-10-31 PROCEDURE — 96372 THER/PROPH/DIAG INJ SC/IM: CPT | Performed by: NURSE PRACTITIONER

## 2019-10-31 RX ORDER — CYANOCOBALAMIN 1000 UG/ML
1000 INJECTION INTRAMUSCULAR; SUBCUTANEOUS ONCE
Status: COMPLETED | OUTPATIENT
Start: 2019-10-31 | End: 2019-10-31

## 2019-10-31 RX ADMIN — CYANOCOBALAMIN 1000 MCG: 1000 INJECTION INTRAMUSCULAR; SUBCUTANEOUS at 11:57

## 2019-11-08 DIAGNOSIS — E55.9 VITAMIN D DEFICIENCY: ICD-10-CM

## 2019-11-08 DIAGNOSIS — E78.00 PURE HYPERCHOLESTEROLEMIA: ICD-10-CM

## 2019-11-08 DIAGNOSIS — F32.A DEPRESSION, UNSPECIFIED DEPRESSION TYPE: ICD-10-CM

## 2019-11-08 DIAGNOSIS — J30.9 ALLERGIC RHINITIS, UNSPECIFIED SEASONALITY, UNSPECIFIED TRIGGER: ICD-10-CM

## 2019-11-08 DIAGNOSIS — K52.9 GASTROENTERITIS: ICD-10-CM

## 2019-11-08 DIAGNOSIS — I10 ESSENTIAL HYPERTENSION: ICD-10-CM

## 2019-11-08 DIAGNOSIS — Z00.00 MEDICARE ANNUAL WELLNESS VISIT, INITIAL: ICD-10-CM

## 2019-11-08 DIAGNOSIS — I15.9 SECONDARY HYPERTENSION: ICD-10-CM

## 2019-11-08 DIAGNOSIS — B37.9 YEAST INFECTION: ICD-10-CM

## 2019-11-08 DIAGNOSIS — E03.9 ACQUIRED HYPOTHYROIDISM: ICD-10-CM

## 2019-11-08 DIAGNOSIS — E11.9 TYPE 2 DIABETES MELLITUS WITHOUT COMPLICATION, WITHOUT LONG-TERM CURRENT USE OF INSULIN (HCC): ICD-10-CM

## 2019-11-08 RX ORDER — NITROGLYCERIN 0.4 MG/1
0.4 TABLET SUBLINGUAL PRN
Qty: 25 TABLET | Refills: 3 | Status: SHIPPED | OUTPATIENT
Start: 2019-11-08 | End: 2020-07-15 | Stop reason: SDUPTHER

## 2019-11-08 RX ORDER — FEXOFENADINE HCL 180 MG/1
180 TABLET ORAL DAILY
Qty: 90 TABLET | Refills: 3 | Status: SHIPPED | OUTPATIENT
Start: 2019-11-08 | End: 2020-07-15 | Stop reason: SDUPTHER

## 2019-11-08 RX ORDER — LANCETS
EACH MISCELLANEOUS
Qty: 100 EACH | Refills: 3 | Status: SHIPPED | OUTPATIENT
Start: 2019-11-08 | End: 2021-03-10 | Stop reason: SDUPTHER

## 2019-11-08 RX ORDER — ALENDRONATE SODIUM 70 MG/1
TABLET ORAL
Qty: 12 TABLET | Refills: 3 | Status: SHIPPED | OUTPATIENT
Start: 2019-11-08 | End: 2020-07-15 | Stop reason: SDUPTHER

## 2019-11-08 RX ORDER — FLUTICASONE PROPIONATE 50 MCG
SPRAY, SUSPENSION (ML) NASAL
Qty: 16 G | Refills: 3 | Status: SHIPPED | OUTPATIENT
Start: 2019-11-08 | End: 2020-07-15 | Stop reason: SDUPTHER

## 2019-11-08 RX ORDER — LISINOPRIL 20 MG/1
20 TABLET ORAL DAILY
Qty: 90 TABLET | Refills: 3 | Status: SHIPPED | OUTPATIENT
Start: 2019-11-08 | End: 2020-01-15 | Stop reason: DRUGHIGH

## 2019-11-08 RX ORDER — ESCITALOPRAM OXALATE 10 MG/1
TABLET ORAL
Qty: 90 TABLET | Refills: 3 | Status: SHIPPED | OUTPATIENT
Start: 2019-11-08 | End: 2020-07-15 | Stop reason: SDUPTHER

## 2019-11-08 RX ORDER — ATENOLOL 50 MG/1
TABLET ORAL
Qty: 90 TABLET | Refills: 3 | Status: SHIPPED | OUTPATIENT
Start: 2019-11-08 | End: 2020-02-27 | Stop reason: SDUPTHER

## 2019-11-08 RX ORDER — ESOMEPRAZOLE MAGNESIUM 40 MG/1
CAPSULE, DELAYED RELEASE ORAL
Qty: 90 CAPSULE | Refills: 3 | Status: SHIPPED | OUTPATIENT
Start: 2019-11-08 | End: 2020-07-15 | Stop reason: SDUPTHER

## 2019-11-08 RX ORDER — ATORVASTATIN CALCIUM 20 MG/1
20 TABLET, FILM COATED ORAL DAILY
Qty: 90 TABLET | Refills: 3 | Status: SHIPPED | OUTPATIENT
Start: 2019-11-08 | End: 2020-07-15 | Stop reason: SDUPTHER

## 2019-11-08 RX ORDER — ASPIRIN 81 MG/1
81 TABLET ORAL DAILY
Qty: 90 TABLET | Refills: 3 | Status: SHIPPED | OUTPATIENT
Start: 2019-11-08 | End: 2020-07-15 | Stop reason: SDUPTHER

## 2019-11-08 RX ORDER — ERGOCALCIFEROL (VITAMIN D2) 1250 MCG
50000 CAPSULE ORAL WEEKLY
Qty: 12 CAPSULE | Refills: 3 | Status: SHIPPED | OUTPATIENT
Start: 2019-11-08 | End: 2020-07-15 | Stop reason: SDUPTHER

## 2019-11-08 RX ORDER — LEVOTHYROXINE SODIUM 0.03 MG/1
TABLET ORAL
Qty: 90 TABLET | Refills: 3 | Status: SHIPPED | OUTPATIENT
Start: 2019-11-08 | End: 2020-07-15 | Stop reason: SDUPTHER

## 2019-11-08 RX ORDER — MONTELUKAST SODIUM 10 MG/1
TABLET ORAL
Qty: 90 TABLET | Refills: 3 | Status: SHIPPED | OUTPATIENT
Start: 2019-11-08 | End: 2020-07-15 | Stop reason: SDUPTHER

## 2019-12-04 ENCOUNTER — OFFICE VISIT (OUTPATIENT)
Dept: PRIMARY CARE CLINIC | Age: 74
End: 2019-12-04
Payer: MEDICARE

## 2019-12-04 VITALS
HEIGHT: 62 IN | DIASTOLIC BLOOD PRESSURE: 80 MMHG | HEART RATE: 73 BPM | SYSTOLIC BLOOD PRESSURE: 148 MMHG | OXYGEN SATURATION: 95 % | BODY MASS INDEX: 30.03 KG/M2 | TEMPERATURE: 98 F | WEIGHT: 163.2 LBS | RESPIRATION RATE: 16 BRPM

## 2019-12-04 DIAGNOSIS — Z00.00 ROUTINE GENERAL MEDICAL EXAMINATION AT A HEALTH CARE FACILITY: Primary | ICD-10-CM

## 2019-12-04 DIAGNOSIS — I15.9 SECONDARY HYPERTENSION: ICD-10-CM

## 2019-12-04 DIAGNOSIS — E53.8 B12 DEFICIENCY: ICD-10-CM

## 2019-12-04 PROCEDURE — 4040F PNEUMOC VAC/ADMIN/RCVD: CPT | Performed by: NURSE PRACTITIONER

## 2019-12-04 PROCEDURE — 3017F COLORECTAL CA SCREEN DOC REV: CPT | Performed by: NURSE PRACTITIONER

## 2019-12-04 PROCEDURE — G8482 FLU IMMUNIZE ORDER/ADMIN: HCPCS | Performed by: NURSE PRACTITIONER

## 2019-12-04 PROCEDURE — 96372 THER/PROPH/DIAG INJ SC/IM: CPT | Performed by: NURSE PRACTITIONER

## 2019-12-04 PROCEDURE — G0439 PPPS, SUBSEQ VISIT: HCPCS | Performed by: NURSE PRACTITIONER

## 2019-12-04 PROCEDURE — 1123F ACP DISCUSS/DSCN MKR DOCD: CPT | Performed by: NURSE PRACTITIONER

## 2019-12-04 RX ORDER — CYANOCOBALAMIN 1000 UG/ML
1000 INJECTION INTRAMUSCULAR; SUBCUTANEOUS ONCE
Status: COMPLETED | OUTPATIENT
Start: 2019-12-04 | End: 2019-12-04

## 2019-12-04 RX ORDER — LISINOPRIL 30 MG/1
30 TABLET ORAL DAILY
Qty: 90 TABLET | Refills: 3 | Status: SHIPPED | OUTPATIENT
Start: 2019-12-04 | End: 2020-07-15 | Stop reason: SDUPTHER

## 2019-12-04 RX ADMIN — CYANOCOBALAMIN 1000 MCG: 1000 INJECTION INTRAMUSCULAR; SUBCUTANEOUS at 10:19

## 2019-12-04 ASSESSMENT — PATIENT HEALTH QUESTIONNAIRE - PHQ9
SUM OF ALL RESPONSES TO PHQ QUESTIONS 1-9: 0
SUM OF ALL RESPONSES TO PHQ QUESTIONS 1-9: 0

## 2019-12-04 ASSESSMENT — LIFESTYLE VARIABLES: HOW OFTEN DO YOU HAVE A DRINK CONTAINING ALCOHOL: 0

## 2020-01-03 ENCOUNTER — NURSE ONLY (OUTPATIENT)
Dept: PRIMARY CARE CLINIC | Age: 75
End: 2020-01-03
Payer: MEDICARE

## 2020-01-03 PROCEDURE — 96372 THER/PROPH/DIAG INJ SC/IM: CPT | Performed by: NURSE PRACTITIONER

## 2020-01-03 RX ORDER — CYANOCOBALAMIN 1000 UG/ML
1000 INJECTION INTRAMUSCULAR; SUBCUTANEOUS ONCE
Status: COMPLETED | OUTPATIENT
Start: 2020-01-03 | End: 2020-01-03

## 2020-01-03 RX ADMIN — CYANOCOBALAMIN 1000 MCG: 1000 INJECTION INTRAMUSCULAR; SUBCUTANEOUS at 13:54

## 2020-01-15 ENCOUNTER — OFFICE VISIT (OUTPATIENT)
Dept: PRIMARY CARE CLINIC | Age: 75
End: 2020-01-15
Payer: MEDICARE

## 2020-01-15 VITALS
DIASTOLIC BLOOD PRESSURE: 78 MMHG | RESPIRATION RATE: 16 BRPM | BODY MASS INDEX: 30.11 KG/M2 | SYSTOLIC BLOOD PRESSURE: 136 MMHG | HEART RATE: 66 BPM | TEMPERATURE: 98.1 F | WEIGHT: 163.6 LBS | OXYGEN SATURATION: 96 % | HEIGHT: 62 IN

## 2020-01-15 PROCEDURE — 3017F COLORECTAL CA SCREEN DOC REV: CPT | Performed by: NURSE PRACTITIONER

## 2020-01-15 PROCEDURE — 99214 OFFICE O/P EST MOD 30 MIN: CPT | Performed by: NURSE PRACTITIONER

## 2020-01-15 PROCEDURE — 4040F PNEUMOC VAC/ADMIN/RCVD: CPT | Performed by: NURSE PRACTITIONER

## 2020-01-15 PROCEDURE — 1036F TOBACCO NON-USER: CPT | Performed by: NURSE PRACTITIONER

## 2020-01-15 PROCEDURE — 3046F HEMOGLOBIN A1C LEVEL >9.0%: CPT | Performed by: NURSE PRACTITIONER

## 2020-01-15 PROCEDURE — 1090F PRES/ABSN URINE INCON ASSESS: CPT | Performed by: NURSE PRACTITIONER

## 2020-01-15 PROCEDURE — 2022F DILAT RTA XM EVC RTNOPTHY: CPT | Performed by: NURSE PRACTITIONER

## 2020-01-15 PROCEDURE — G8399 PT W/DXA RESULTS DOCUMENT: HCPCS | Performed by: NURSE PRACTITIONER

## 2020-01-15 PROCEDURE — 1123F ACP DISCUSS/DSCN MKR DOCD: CPT | Performed by: NURSE PRACTITIONER

## 2020-01-15 PROCEDURE — G8417 CALC BMI ABV UP PARAM F/U: HCPCS | Performed by: NURSE PRACTITIONER

## 2020-01-15 PROCEDURE — G8427 DOCREV CUR MEDS BY ELIG CLIN: HCPCS | Performed by: NURSE PRACTITIONER

## 2020-01-15 PROCEDURE — G8482 FLU IMMUNIZE ORDER/ADMIN: HCPCS | Performed by: NURSE PRACTITIONER

## 2020-01-15 RX ORDER — DONEPEZIL HYDROCHLORIDE 5 MG/1
5 TABLET, FILM COATED ORAL NIGHTLY
Qty: 90 TABLET | Refills: 3 | Status: SHIPPED | OUTPATIENT
Start: 2020-01-15 | End: 2020-07-15 | Stop reason: SDUPTHER

## 2020-01-15 ASSESSMENT — ENCOUNTER SYMPTOMS
GASTROINTESTINAL NEGATIVE: 1
RESPIRATORY NEGATIVE: 1

## 2020-01-15 ASSESSMENT — PATIENT HEALTH QUESTIONNAIRE - PHQ9
1. LITTLE INTEREST OR PLEASURE IN DOING THINGS: 1
SUM OF ALL RESPONSES TO PHQ QUESTIONS 1-9: 2
SUM OF ALL RESPONSES TO PHQ QUESTIONS 1-9: 2
2. FEELING DOWN, DEPRESSED OR HOPELESS: 1
SUM OF ALL RESPONSES TO PHQ9 QUESTIONS 1 & 2: 2

## 2020-01-15 NOTE — PROGRESS NOTES
SUBJECTIVE:    Patient ID: Brett Ayers is a 69 y.o. female. Chief Complaint   Patient presents with    Follow-up     6 weeks    Diabetes    Hypertension         HPI:  She is following up on her diabetes and htn. Her blood pressure is staying elevated despite her medications. She says her daughter wants her to talk to me about her forgetfulness she forgets things if there is more than a couple of things. She says she has been walking some and watching her sodium. Working on exercise for her balance. She is doing well with her blood sugar and taking her thyroid daily. Patient's medications,allergies, past medical, surgical, social and family histories were reviewed and updated as appropriate. .  Current Outpatient Medications on File Prior to Visit   Medication Sig Dispense Refill    lisinopril (PRINIVIL;ZESTRIL) 30 MG tablet Take 1 tablet by mouth daily 90 tablet 3    nystatin-triamcinolone (MYCOLOG II) 108666-1.1 UNIT/GM-% cream Apply topically 2 times daily. 60 g 3    nitroGLYCERIN (NITROSTAT) 0.4 MG SL tablet Place 1 tablet under the tongue as needed for Chest pain 25 tablet 3    montelukast (SINGULAIR) 10 MG tablet take 1 tablet by mouth at bedtime 90 tablet 3    metFORMIN (GLUCOPHAGE) 500 MG tablet Take 1 tablet by mouth three times daily 270 tablet 3    levothyroxine (SYNTHROID) 25 MCG tablet take 1 tablet by mouth once daily 90 tablet 3    fluticasone (FLONASE) 50 MCG/ACT nasal spray instill 2 sprays into each nostril once daily.  90 day supply 16 g 3    fexofenadine (ALLEGRA) 180 MG tablet Take 1 tablet by mouth daily 90 tablet 3    esomeprazole (NEXIUM) 40 MG delayed release capsule take 1 capsule by mouth every morning BEFORE BREAKFAST 90 capsule 3    escitalopram (LEXAPRO) 10 MG tablet take 1 tablet by mouth once daily 90 tablet 3    ergocalciferol (DRISDOL) 1.25 MG (28739 UT) capsule Take 1 capsule by mouth once a week 90 day supply 12 capsule 3    empagliflozin Conjunctiva/sclera: Conjunctivae normal.      Pupils: Pupils are equal, round, and reactive to light. Neck:      Musculoskeletal: Normal range of motion and neck supple. Thyroid: No thyromegaly. Vascular: No JVD. Cardiovascular:      Rate and Rhythm: Normal rate and regular rhythm. Heart sounds: No murmur. No friction rub. No gallop. Pulmonary:      Effort: Pulmonary effort is normal. No respiratory distress. Breath sounds: Normal breath sounds. No wheezing or rales. Abdominal:      General: Bowel sounds are normal. There is no distension. Palpations: Abdomen is soft. Tenderness: There is no tenderness. There is no guarding. Musculoskeletal:         General: No tenderness. Skin:     General: Skin is warm and dry. Findings: No rash. Neurological:      Mental Status: She is alert and oriented to person, place, and time. Psychiatric:         Cognition and Memory: Cognition is not impaired. She exhibits impaired remote memory (forgetful). Judgment: Judgment normal.         No results found for requested labs within last 30 days. Hemoglobin A1C (%)   Date Value   10/02/2019 6.5 (H)     Microalbumin, Random Urine (mg/dL)   Date Value   04/01/2019 <1.20     LDL Calculated (mg/dL)   Date Value   07/29/2019 72           Lab Results   Component Value Date    TSH 5.28 07/29/2019         ASSESSMENT/PLAN:     Abrahan Guzman was seen today for follow-up, diabetes and hypertension. Diagnoses and all orders for this visit:    Essential hypertension  BP is stable. I have advised her on low-sodium diet, exercise and weight control. I am going to continue current medication . Will monitor her renal function every few months, have advised her to check blood pressure frequently and to keep a record of this. Memory loss  -     donepezil (ARICEPT) 5 MG tablet; Take 1 tablet by mouth nightly    Acquired hypothyroidism  BP is stable.  I have advised her on low-sodium diet, exercise and weight control. I am going to continue current medication. Will monitor her renal function every few months, have advised her to check blood pressure frequently and to keep a record of this. Type 2 diabetes mellitus without complication, without long-term current use of insulin ([facility])  Stable. I advised her regarding diabetic diet, exercise and weight control. Also, I advised her to stay on the current medication, monitor her fingerstick closely. I am going to check the A1c every few months. I will check microalbumin on a yearly basis. I have also advised her to have a yearly eye exam and to monitor her feet closely. Along with instruction of possible complications related to diabetes, even with good control. A1C will be checked  in few months. Lab Results   Component Value Date    LABA1C 6.5 (H) 10/02/2019       Vitamin B12 deficiency  Will check levels  Vitamin D deficiency  Continue supplement and recheck levels.    Medications Discontinued During This Encounter   Medication Reason    lisinopril (PRINIVIL;ZESTRIL) 20 MG tablet DOSE ADJUSTMENT

## 2020-01-15 NOTE — PROGRESS NOTES
Chief Complaint   Patient presents with    Follow-up     6 weeks    Diabetes    Hypertension          Have you seen any other physician or provider since your last visit no    Have you had any other diagnostic tests since your last visit? no    Have you changed or stopped any medications since your last visit? Yes blood pressure meds    Patient is here for a follow up  On her blood pressure. Her Lisinopril was increased to 30 mg. It is still elevated today. She took her blood pressure last night and it was 173/70.

## 2020-01-28 ENCOUNTER — NURSE ONLY (OUTPATIENT)
Dept: PRIMARY CARE CLINIC | Age: 75
End: 2020-01-28
Payer: MEDICARE

## 2020-01-28 PROCEDURE — 96372 THER/PROPH/DIAG INJ SC/IM: CPT | Performed by: NURSE PRACTITIONER

## 2020-01-28 RX ORDER — CYANOCOBALAMIN 1000 UG/ML
1000 INJECTION INTRAMUSCULAR; SUBCUTANEOUS ONCE
Status: COMPLETED | OUTPATIENT
Start: 2020-01-28 | End: 2020-01-28

## 2020-01-28 RX ADMIN — CYANOCOBALAMIN 1000 MCG: 1000 INJECTION INTRAMUSCULAR; SUBCUTANEOUS at 13:42

## 2020-02-24 ENCOUNTER — OFFICE VISIT (OUTPATIENT)
Dept: PRIMARY CARE CLINIC | Age: 75
End: 2020-02-24
Payer: MEDICARE

## 2020-02-24 VITALS
SYSTOLIC BLOOD PRESSURE: 122 MMHG | BODY MASS INDEX: 30.22 KG/M2 | DIASTOLIC BLOOD PRESSURE: 86 MMHG | OXYGEN SATURATION: 96 % | RESPIRATION RATE: 18 BRPM | WEIGHT: 164.2 LBS | HEART RATE: 62 BPM | HEIGHT: 62 IN

## 2020-02-24 PROCEDURE — G8427 DOCREV CUR MEDS BY ELIG CLIN: HCPCS | Performed by: NURSE PRACTITIONER

## 2020-02-24 PROCEDURE — 1036F TOBACCO NON-USER: CPT | Performed by: NURSE PRACTITIONER

## 2020-02-24 PROCEDURE — 99213 OFFICE O/P EST LOW 20 MIN: CPT | Performed by: NURSE PRACTITIONER

## 2020-02-24 PROCEDURE — 1090F PRES/ABSN URINE INCON ASSESS: CPT | Performed by: NURSE PRACTITIONER

## 2020-02-24 PROCEDURE — G8482 FLU IMMUNIZE ORDER/ADMIN: HCPCS | Performed by: NURSE PRACTITIONER

## 2020-02-24 PROCEDURE — 96372 THER/PROPH/DIAG INJ SC/IM: CPT | Performed by: NURSE PRACTITIONER

## 2020-02-24 PROCEDURE — G8399 PT W/DXA RESULTS DOCUMENT: HCPCS | Performed by: NURSE PRACTITIONER

## 2020-02-24 PROCEDURE — 4040F PNEUMOC VAC/ADMIN/RCVD: CPT | Performed by: NURSE PRACTITIONER

## 2020-02-24 PROCEDURE — 1123F ACP DISCUSS/DSCN MKR DOCD: CPT | Performed by: NURSE PRACTITIONER

## 2020-02-24 PROCEDURE — G8417 CALC BMI ABV UP PARAM F/U: HCPCS | Performed by: NURSE PRACTITIONER

## 2020-02-24 PROCEDURE — 3017F COLORECTAL CA SCREEN DOC REV: CPT | Performed by: NURSE PRACTITIONER

## 2020-02-24 RX ORDER — CYANOCOBALAMIN 1000 UG/ML
1000 INJECTION INTRAMUSCULAR; SUBCUTANEOUS ONCE
Status: COMPLETED | OUTPATIENT
Start: 2020-02-24 | End: 2020-02-24

## 2020-02-24 RX ORDER — CEFDINIR 300 MG/1
300 CAPSULE ORAL 2 TIMES DAILY
Qty: 14 CAPSULE | Refills: 0 | Status: SHIPPED | OUTPATIENT
Start: 2020-02-24 | End: 2020-03-02

## 2020-02-24 RX ADMIN — CYANOCOBALAMIN 1000 MCG: 1000 INJECTION INTRAMUSCULAR; SUBCUTANEOUS at 11:15

## 2020-02-24 ASSESSMENT — ENCOUNTER SYMPTOMS
SHORTNESS OF BREATH: 0
CONSTIPATION: 0
CHEST TIGHTNESS: 0
WHEEZING: 0
BACK PAIN: 0
COUGH: 1
RHINORRHEA: 1
ABDOMINAL DISTENTION: 0
SORE THROAT: 0
NAUSEA: 0
SINUS PRESSURE: 1
SINUS PAIN: 1
ABDOMINAL PAIN: 0
COLOR CHANGE: 0
DIARRHEA: 0

## 2020-02-24 NOTE — PROGRESS NOTES
SUBJECTIVE:    Patient ID: Annie Hernandez is a 70 y. o.female. Chief Complaint   Patient presents with    Allergic Rhinitis      x 4 days for all symptoms    Headache    Congestion    Cough         HPI:    Patient presents to clinic with c/o nasal and sinus congestion for the past week. Drainage started clear and now thick, green-yellow in color. Associated symptoms include frontal headache, fatigue, postnasal drainage, mild dry cough, sinus pressure. Denies chills, n/v/d, sick contact, SOB. No relieving or worsening factors reported. No treatment regimens. Patient's medications, allergies, past medical, surgical, social and family histories were reviewed and updated as appropriate in electronic medical record. Outpatient Medications Marked as Taking for the 2/24/20 encounter (Office Visit) with MICKI Phillips CNP   Medication Sig Dispense Refill    donepezil (ARICEPT) 5 MG tablet Take 1 tablet by mouth nightly 90 tablet 3    lisinopril (PRINIVIL;ZESTRIL) 30 MG tablet Take 1 tablet by mouth daily 90 tablet 3    nystatin-triamcinolone (MYCOLOG II) 559076-3.1 UNIT/GM-% cream Apply topically 2 times daily. 60 g 3    nitroGLYCERIN (NITROSTAT) 0.4 MG SL tablet Place 1 tablet under the tongue as needed for Chest pain 25 tablet 3    montelukast (SINGULAIR) 10 MG tablet take 1 tablet by mouth at bedtime 90 tablet 3    metFORMIN (GLUCOPHAGE) 500 MG tablet Take 1 tablet by mouth three times daily 270 tablet 3    levothyroxine (SYNTHROID) 25 MCG tablet take 1 tablet by mouth once daily 90 tablet 3    fluticasone (FLONASE) 50 MCG/ACT nasal spray instill 2 sprays into each nostril once daily.  90 day supply 16 g 3    fexofenadine (ALLEGRA) 180 MG tablet Take 1 tablet by mouth daily 90 tablet 3    esomeprazole (NEXIUM) 40 MG delayed release capsule take 1 capsule by mouth every morning BEFORE BREAKFAST 90 capsule 3    escitalopram (LEXAPRO) 10 MG tablet take 1 tablet by mouth once daily 90 tablet 3    ergocalciferol (DRISDOL) 1.25 MG (53955 UT) capsule Take 1 capsule by mouth once a week 90 day supply 12 capsule 3    empagliflozin (JARDIANCE) 10 MG tablet Take 1 tablet by mouth daily 90 tablet 3    blood glucose test strips (ACCU-CHEK TAYLA PLUS) strip use 1 three times a day 300 strip 3    atorvastatin (LIPITOR) 20 MG tablet Take 1 tablet by mouth daily 90 tablet 3    atenolol (TENORMIN) 50 MG tablet take 1 tablet by mouth once daily 90 tablet 3    aspirin 81 MG EC tablet Take 1 tablet by mouth daily 90 tablet 3    alendronate (FOSAMAX) 70 MG tablet take 1 tablet by mouth every week 12 tablet 3    ACCU-CHEK MULTICLIX LANCETS MISC Finger stick tid 100 each 3    Biotin 5000 MCG TABS Take by mouth      Lactobacillus (PROBIOTIC ACIDOPHILUS PO) Take by mouth      Blood Glucose Monitoring Suppl (ACCU-CHEK TAYLA PLUS) W/DEVICE KIT 1 kit by Does not apply route 3 times daily as needed Dx:250.00 1 kit 0    calcium carbonate 600 MG TABS tablet Take 1 tablet by mouth daily. Review of Systems   Constitutional: Positive for fatigue. Negative for chills, diaphoresis and fever. HENT: Positive for congestion, postnasal drip, rhinorrhea, sinus pressure and sinus pain. Negative for ear discharge, ear pain and sore throat. Respiratory: Positive for cough. Negative for chest tightness, shortness of breath and wheezing. Cardiovascular: Negative for chest pain, palpitations and leg swelling. Gastrointestinal: Negative for abdominal distention, abdominal pain, constipation, diarrhea and nausea. Musculoskeletal: Negative for arthralgias, back pain and myalgias. Skin: Negative for color change, pallor and rash. Allergic/Immunologic: Positive for environmental allergies. Neurological: Negative for dizziness, facial asymmetry, light-headedness and headaches.        Past Medical History:   Diagnosis Date    Anxiety     Arthritis     Asthma     Diabetes mellitus (HCC)     GERD (gastroesophageal reflux disease)     Hyperlipidemia     Hypertension     Hypothyroidism     Melanoma ([facility] [address] 2003    skin cancer removed from [facility]    Osteoporosis     Sleep apnea     Thyroid nodule      Past Surgical History:   Procedure Laterality Date    COLON SURGERY      POLYPS REMOVED    COLONOSCOPY  02/24/2017    Dr Teena Hatch    POLYPECTOMY      SKIN CANCER EXCISION      RIGHT LEG     THYROIDECTOMY      WRIST SURGERY      LEFT     Family History   Problem Relation Age of Onset    Diabetes Mother     Heart Disease Mother         MI    Stroke Mother     Heart Disease Father         MI    Stroke Father     Diabetes Brother     Hypertension Brother     Diabetes Sister     Hypertension Sister       Social History     Tobacco Use   Smoking Status Never Smoker   Smokeless Tobacco Never Used       OBJECTIVE:   Wt Readings from Last 3 Encounters:   02/24/20 164 lb 3.2 oz (74.5 kg)   01/15/20 163 lb 9.6 oz (74.2 kg)   12/04/19 163 lb 3.2 oz (74 kg)     BP Readings from Last 3 Encounters:   02/24/20 122/86   01/15/20 136/78   12/04/19 (!) 148/80       /86 (Site: Right Upper Arm, Position: Sitting)   Pulse 62   Resp 18   Ht 5' 2\" (1.575 m)   Wt 164 lb 3.2 oz (74.5 kg)   SpO2 96% Comment: room air  BMI 30.03 kg/m²      Physical Exam  Vitals signs and nursing note reviewed. Constitutional:       General: She is not in acute distress. Appearance: She is well-developed. HENT:      Head: Normocephalic. Right Ear: Tympanic membrane and ear canal normal.      Left Ear: Tympanic membrane and ear canal normal.      Nose: Mucosal edema and congestion present. Right Sinus: Maxillary sinus tenderness and frontal sinus tenderness present. Left Sinus: Maxillary sinus tenderness and frontal sinus tenderness present. Mouth/Throat:      Mouth: Mucous membranes are moist.      Pharynx: Oropharynx is clear. Uvula midline. Posterior oropharyngeal erythema present.  No oropharyngeal mouth 2 times daily for 7 days     Dispense:  14 capsule     Refill:  0

## 2020-02-27 RX ORDER — ATENOLOL 50 MG/1
TABLET ORAL
Qty: 90 TABLET | Refills: 0 | Status: SHIPPED | OUTPATIENT
Start: 2020-02-27 | End: 2020-07-15 | Stop reason: SDUPTHER

## 2020-03-09 ENCOUNTER — TELEPHONE (OUTPATIENT)
Dept: SURGERY | Age: 75
End: 2020-03-09

## 2020-03-09 NOTE — TELEPHONE ENCOUNTER
Pt is on a 4 year recall list for a colonoscopy with Dr. Anupam Gale. I called pt to schedule that appointment, but received no answer. I left a voice message and will try back at a later date.

## 2020-03-13 ENCOUNTER — TELEPHONE (OUTPATIENT)
Dept: SURGERY | Age: 75
End: 2020-03-13

## 2020-03-26 ENCOUNTER — VIRTUAL VISIT (OUTPATIENT)
Dept: PRIMARY CARE CLINIC | Age: 75
End: 2020-03-26
Payer: MEDICARE

## 2020-03-26 PROCEDURE — G2012 BRIEF CHECK IN BY MD/QHP: HCPCS | Performed by: NURSE PRACTITIONER

## 2020-03-26 RX ORDER — BENZONATATE 200 MG/1
200 CAPSULE ORAL 3 TIMES DAILY PRN
Qty: 30 CAPSULE | Refills: 0 | Status: SHIPPED | OUTPATIENT
Start: 2020-03-26 | End: 2020-04-02

## 2020-03-26 RX ORDER — CEFDINIR 300 MG/1
300 CAPSULE ORAL 2 TIMES DAILY
Qty: 20 CAPSULE | Refills: 0 | Status: SHIPPED | OUTPATIENT
Start: 2020-03-26 | End: 2020-04-05

## 2020-03-26 NOTE — PROGRESS NOTES
Stacy Carroll is a 70 y.o. female evaluated via telephone on 3/26/2020. Consent:  She and/or health care decision maker is aware that that she may receive a bill for this telephone service, depending on her insurance coverage, and has provided verbal consent to proceed: Yes      Documentation:  I communicated with the patient and/or health care decision maker about runny nose, sore throat irritation, headache and cough. It has been going on for 3 days. Denies any fever. She has been only to her  doctor's appt. She is not taking any cold medications but has been taking Flonase, . Allegra and Singulair. She is not getting better. She is concerned about her b12 injections and how to get it. Details of this discussion including any medical advice provided: will treat for Acute sinusitis. [facility]. Will have the pharmacy provide her with otc b12 supplement and with hold any injections for the next month due to the Covid pandemic. I have advised her to continue her allergy medication. Increase liquids and rest.     I affirm this is a Patient Initiated Episode with an Established Patient who has not had a related appointment within my department in the past 7 days or scheduled within the next 24 hours.     Total Time: minutes: 5-10 minutes    Note: not billable if this call serves to triage the patient into an appointment for the relevant concern      Louise Hogan

## 2020-04-15 ENCOUNTER — VIRTUAL VISIT (OUTPATIENT)
Dept: PRIMARY CARE CLINIC | Age: 75
End: 2020-04-15
Payer: MEDICARE

## 2020-04-15 PROCEDURE — 4040F PNEUMOC VAC/ADMIN/RCVD: CPT | Performed by: NURSE PRACTITIONER

## 2020-04-15 PROCEDURE — G2025 DIS SITE TELE SVCS RHC/FQHC: HCPCS | Performed by: NURSE PRACTITIONER

## 2020-04-15 PROCEDURE — 3017F COLORECTAL CA SCREEN DOC REV: CPT | Performed by: NURSE PRACTITIONER

## 2020-04-15 PROCEDURE — G8399 PT W/DXA RESULTS DOCUMENT: HCPCS | Performed by: NURSE PRACTITIONER

## 2020-04-15 PROCEDURE — 1090F PRES/ABSN URINE INCON ASSESS: CPT | Performed by: NURSE PRACTITIONER

## 2020-04-15 PROCEDURE — 1123F ACP DISCUSS/DSCN MKR DOCD: CPT | Performed by: NURSE PRACTITIONER

## 2020-04-15 PROCEDURE — 2022F DILAT RTA XM EVC RTNOPTHY: CPT | Performed by: NURSE PRACTITIONER

## 2020-04-15 PROCEDURE — G8417 CALC BMI ABV UP PARAM F/U: HCPCS | Performed by: NURSE PRACTITIONER

## 2020-04-15 PROCEDURE — 1036F TOBACCO NON-USER: CPT | Performed by: NURSE PRACTITIONER

## 2020-04-15 PROCEDURE — G8427 DOCREV CUR MEDS BY ELIG CLIN: HCPCS | Performed by: NURSE PRACTITIONER

## 2020-04-15 PROCEDURE — 3046F HEMOGLOBIN A1C LEVEL >9.0%: CPT | Performed by: NURSE PRACTITIONER

## 2020-06-09 ENCOUNTER — TELEPHONE (OUTPATIENT)
Dept: PRIMARY CARE CLINIC | Age: 75
End: 2020-06-09

## 2020-06-09 NOTE — TELEPHONE ENCOUNTER
Pt called asking that we set up a colonoscopy with Dr. Redd Ferraro for her after receiving an Franci call.

## 2020-06-15 ENCOUNTER — PREP FOR SURGERY (OUTPATIENT)
Dept: OTHER | Facility: HOSPITAL | Age: 75
End: 2020-06-15

## 2020-06-15 DIAGNOSIS — H25.11 NUCLEAR SCLEROTIC CATARACT OF RIGHT EYE: Primary | ICD-10-CM

## 2020-06-15 DIAGNOSIS — Z11.59 SPECIAL SCREENING EXAMINATION FOR UNSPECIFIED VIRAL DISEASE: Primary | ICD-10-CM

## 2020-06-15 RX ORDER — PHENYLEPHRINE HYDROCHLORIDE 100 MG/ML
1 SOLUTION/ DROPS OPHTHALMIC
Status: CANCELLED | OUTPATIENT
Start: 2020-06-15 | End: 2020-06-15

## 2020-06-15 RX ORDER — SODIUM CHLORIDE 0.9 % (FLUSH) 0.9 %
3 SYRINGE (ML) INJECTION EVERY 12 HOURS SCHEDULED
Status: CANCELLED | OUTPATIENT
Start: 2020-06-15

## 2020-06-15 RX ORDER — SODIUM CHLORIDE 0.9 % (FLUSH) 0.9 %
1-10 SYRINGE (ML) INJECTION AS NEEDED
Status: CANCELLED | OUTPATIENT
Start: 2020-06-15

## 2020-06-15 RX ORDER — TETRACAINE HYDROCHLORIDE 5 MG/ML
1 SOLUTION OPHTHALMIC SEE ADMIN INSTRUCTIONS
Status: CANCELLED | OUTPATIENT
Start: 2020-06-15

## 2020-06-15 RX ORDER — PREDNISOLONE ACETATE 10 MG/ML
1 SUSPENSION/ DROPS OPHTHALMIC SEE ADMIN INSTRUCTIONS
Status: CANCELLED | OUTPATIENT
Start: 2020-06-15

## 2020-06-16 ENCOUNTER — TELEPHONE (OUTPATIENT)
Dept: SURGERY | Facility: CLINIC | Age: 75
End: 2020-06-16

## 2020-06-17 PROBLEM — H25.11 NUCLEAR SCLEROTIC CATARACT OF RIGHT EYE: Status: ACTIVE | Noted: 2020-06-17

## 2020-06-22 ENCOUNTER — LAB (OUTPATIENT)
Dept: LAB | Facility: HOSPITAL | Age: 75
End: 2020-06-22

## 2020-06-22 DIAGNOSIS — Z11.59 SPECIAL SCREENING EXAMINATION FOR UNSPECIFIED VIRAL DISEASE: ICD-10-CM

## 2020-06-22 LAB
REF LAB TEST METHOD: NORMAL
SARS-COV-2 RNA RESP QL NAA+PROBE: NOT DETECTED

## 2020-06-22 PROCEDURE — U0002 COVID-19 LAB TEST NON-CDC: HCPCS

## 2020-06-22 PROCEDURE — C9803 HOPD COVID-19 SPEC COLLECT: HCPCS

## 2020-06-22 PROCEDURE — U0004 COV-19 TEST NON-CDC HGH THRU: HCPCS

## 2020-06-23 RX ORDER — FEXOFENADINE HCL 180 MG/1
180 TABLET ORAL DAILY
COMMUNITY

## 2020-06-23 RX ORDER — FLUTICASONE PROPIONATE 50 MCG
2 SPRAY, SUSPENSION (ML) NASAL DAILY
COMMUNITY

## 2020-06-23 RX ORDER — LISINOPRIL 30 MG/1
40 TABLET ORAL DAILY
Status: ON HOLD | COMMUNITY
End: 2021-08-10

## 2020-06-23 RX ORDER — ATORVASTATIN CALCIUM 20 MG/1
20 TABLET, FILM COATED ORAL DAILY
COMMUNITY

## 2020-06-23 RX ORDER — LEVOTHYROXINE SODIUM 0.03 MG/1
25 TABLET ORAL DAILY
COMMUNITY

## 2020-06-23 RX ORDER — ALENDRONATE SODIUM 70 MG/1
70 TABLET ORAL
COMMUNITY

## 2020-06-23 RX ORDER — ASPIRIN 81 MG/1
81 TABLET ORAL DAILY
COMMUNITY

## 2020-06-23 RX ORDER — ESCITALOPRAM OXALATE 10 MG/1
10 TABLET ORAL DAILY
COMMUNITY

## 2020-06-23 RX ORDER — MONTELUKAST SODIUM 10 MG/1
10 TABLET ORAL NIGHTLY
COMMUNITY

## 2020-06-23 RX ORDER — ERGOCALCIFEROL 1.25 MG/1
50000 CAPSULE ORAL WEEKLY
COMMUNITY
End: 2021-08-11 | Stop reason: HOSPADM

## 2020-06-23 RX ORDER — METFORMIN HYDROCHLORIDE 500 MG/1
500 TABLET, EXTENDED RELEASE ORAL
COMMUNITY

## 2020-06-23 RX ORDER — DONEPEZIL HYDROCHLORIDE 5 MG/1
10 TABLET, FILM COATED ORAL NIGHTLY
COMMUNITY

## 2020-06-23 RX ORDER — ATENOLOL 50 MG/1
50 TABLET ORAL DAILY
COMMUNITY
End: 2023-03-30 | Stop reason: SDUPTHER

## 2020-06-25 ENCOUNTER — ANESTHESIA (OUTPATIENT)
Dept: PERIOP | Facility: HOSPITAL | Age: 75
End: 2020-06-25

## 2020-06-25 ENCOUNTER — HOSPITAL ENCOUNTER (OUTPATIENT)
Facility: HOSPITAL | Age: 75
Setting detail: HOSPITAL OUTPATIENT SURGERY
Discharge: HOME | End: 2020-06-25
Attending: OPHTHALMOLOGY | Admitting: OPHTHALMOLOGY

## 2020-06-25 ENCOUNTER — ANESTHESIA EVENT (OUTPATIENT)
Dept: PERIOP | Facility: HOSPITAL | Age: 75
End: 2020-06-25

## 2020-06-25 VITALS
BODY MASS INDEX: 29.81 KG/M2 | RESPIRATION RATE: 16 BRPM | WEIGHT: 162 LBS | HEIGHT: 62 IN | TEMPERATURE: 98.2 F | SYSTOLIC BLOOD PRESSURE: 184 MMHG | DIASTOLIC BLOOD PRESSURE: 79 MMHG | HEART RATE: 65 BPM | OXYGEN SATURATION: 97 %

## 2020-06-25 DIAGNOSIS — H25.11 NUCLEAR SCLEROTIC CATARACT OF RIGHT EYE: ICD-10-CM

## 2020-06-25 LAB — GLUCOSE BLDC GLUCOMTR-MCNC: 101 MG/DL (ref 70–130)

## 2020-06-25 PROCEDURE — V2632 POST CHMBR INTRAOCULAR LENS: HCPCS | Performed by: OPHTHALMOLOGY

## 2020-06-25 PROCEDURE — 25010000002 PROPOFOL 200 MG/20ML EMULSION: Performed by: NURSE ANESTHETIST, CERTIFIED REGISTERED

## 2020-06-25 PROCEDURE — 25010000002 ONDANSETRON PER 1 MG: Performed by: NURSE ANESTHETIST, CERTIFIED REGISTERED

## 2020-06-25 PROCEDURE — 82962 GLUCOSE BLOOD TEST: CPT

## 2020-06-25 DEVICE — LENS ACRYSOF IQ SA60WF W/ULTRASERT 6X13MM ACU0T0 25: Type: IMPLANTABLE DEVICE | Site: POSTERIOR CHAMBER | Status: FUNCTIONAL

## 2020-06-25 RX ORDER — SODIUM CHLORIDE, SODIUM LACTATE, POTASSIUM CHLORIDE, CALCIUM CHLORIDE 600; 310; 30; 20 MG/100ML; MG/100ML; MG/100ML; MG/100ML
1000 INJECTION, SOLUTION INTRAVENOUS CONTINUOUS
Status: DISCONTINUED | OUTPATIENT
Start: 2020-06-25 | End: 2020-06-25 | Stop reason: HOSPADM

## 2020-06-25 RX ORDER — PROPOFOL 10 MG/ML
INJECTION, EMULSION INTRAVENOUS AS NEEDED
Status: DISCONTINUED | OUTPATIENT
Start: 2020-06-25 | End: 2020-06-25 | Stop reason: SURG

## 2020-06-25 RX ORDER — ACETAZOLAMIDE 500 MG/1
500 CAPSULE, EXTENDED RELEASE ORAL ONCE
Status: COMPLETED | OUTPATIENT
Start: 2020-06-25 | End: 2020-06-25

## 2020-06-25 RX ORDER — PREDNISOLONE ACETATE 10 MG/ML
1 SUSPENSION/ DROPS OPHTHALMIC SEE ADMIN INSTRUCTIONS
Status: DISCONTINUED | OUTPATIENT
Start: 2020-06-25 | End: 2020-06-25 | Stop reason: HOSPADM

## 2020-06-25 RX ORDER — CYCLOPENTOLATE HYDROCHLORIDE 20 MG/ML
1 SOLUTION/ DROPS OPHTHALMIC
Status: DISPENSED | OUTPATIENT
Start: 2020-06-25 | End: 2020-06-25

## 2020-06-25 RX ORDER — SODIUM CHLORIDE 0.9 % (FLUSH) 0.9 %
1-10 SYRINGE (ML) INJECTION AS NEEDED
Status: DISCONTINUED | OUTPATIENT
Start: 2020-06-25 | End: 2020-06-25 | Stop reason: HOSPADM

## 2020-06-25 RX ORDER — BALANCED SALT SOLUTION 6.4; .75; .48; .3; 3.9; 1.7 MG/ML; MG/ML; MG/ML; MG/ML; MG/ML; MG/ML
SOLUTION OPHTHALMIC AS NEEDED
Status: DISCONTINUED | OUTPATIENT
Start: 2020-06-25 | End: 2020-06-25 | Stop reason: HOSPADM

## 2020-06-25 RX ORDER — PREDNISOLONE ACETATE 10 MG/ML
SUSPENSION/ DROPS OPHTHALMIC AS NEEDED
Status: DISCONTINUED | OUTPATIENT
Start: 2020-06-25 | End: 2020-06-25 | Stop reason: HOSPADM

## 2020-06-25 RX ORDER — PHENYLEPHRINE HYDROCHLORIDE 100 MG/ML
1 SOLUTION/ DROPS OPHTHALMIC
Status: DISPENSED | OUTPATIENT
Start: 2020-06-25 | End: 2020-06-25

## 2020-06-25 RX ORDER — KETAMINE HCL IN NACL, ISO-OSM 100MG/10ML
SYRINGE (ML) INJECTION AS NEEDED
Status: DISCONTINUED | OUTPATIENT
Start: 2020-06-25 | End: 2020-06-25 | Stop reason: SURG

## 2020-06-25 RX ORDER — ONDANSETRON 2 MG/ML
INJECTION INTRAMUSCULAR; INTRAVENOUS AS NEEDED
Status: DISCONTINUED | OUTPATIENT
Start: 2020-06-25 | End: 2020-06-25 | Stop reason: SURG

## 2020-06-25 RX ORDER — SODIUM CHLORIDE 0.9 % (FLUSH) 0.9 %
3 SYRINGE (ML) INJECTION EVERY 12 HOURS SCHEDULED
Status: DISCONTINUED | OUTPATIENT
Start: 2020-06-25 | End: 2020-06-25 | Stop reason: HOSPADM

## 2020-06-25 RX ORDER — TETRACAINE HYDROCHLORIDE 5 MG/ML
SOLUTION OPHTHALMIC AS NEEDED
Status: DISCONTINUED | OUTPATIENT
Start: 2020-06-25 | End: 2020-06-25 | Stop reason: HOSPADM

## 2020-06-25 RX ORDER — TETRACAINE HYDROCHLORIDE 5 MG/ML
1 SOLUTION OPHTHALMIC SEE ADMIN INSTRUCTIONS
Status: COMPLETED | OUTPATIENT
Start: 2020-06-25 | End: 2020-06-25

## 2020-06-25 RX ORDER — LIDOCAINE HYDROCHLORIDE 40 MG/ML
INJECTION, SOLUTION RETROBULBAR; TOPICAL AS NEEDED
Status: DISCONTINUED | OUTPATIENT
Start: 2020-06-25 | End: 2020-06-25 | Stop reason: HOSPADM

## 2020-06-25 RX ADMIN — PHENYLEPHRINE HYDROCHLORIDE 1 DROP: 100 SOLUTION/ DROPS OPHTHALMIC at 07:35

## 2020-06-25 RX ADMIN — SODIUM CHLORIDE, POTASSIUM CHLORIDE, SODIUM LACTATE AND CALCIUM CHLORIDE 1000 ML: 600; 310; 30; 20 INJECTION, SOLUTION INTRAVENOUS at 07:39

## 2020-06-25 RX ADMIN — CYCLOPENTOLATE HYDROCHLORIDE 1 DROP: 20 SOLUTION/ DROPS OPHTHALMIC at 07:35

## 2020-06-25 RX ADMIN — PROPOFOL 40 MG: 10 INJECTION, EMULSION INTRAVENOUS at 08:50

## 2020-06-25 RX ADMIN — PHENYLEPHRINE HYDROCHLORIDE 1 DROP: 100 SOLUTION/ DROPS OPHTHALMIC at 07:40

## 2020-06-25 RX ADMIN — CYCLOPENTOLATE HYDROCHLORIDE 1 DROP: 20 SOLUTION/ DROPS OPHTHALMIC at 07:40

## 2020-06-25 RX ADMIN — TETRACAINE HYDROCHLORIDE 1 DROP: 5 SOLUTION OPHTHALMIC at 07:33

## 2020-06-25 RX ADMIN — Medication 15 MG: at 08:50

## 2020-06-25 RX ADMIN — ACETAZOLAMIDE 500 MG: 500 CAPSULE, EXTENDED RELEASE ORAL at 09:14

## 2020-06-25 RX ADMIN — ONDANSETRON 4 MG: 2 INJECTION INTRAMUSCULAR; INTRAVENOUS at 08:50

## 2020-06-25 RX ADMIN — TETRACAINE HYDROCHLORIDE 1 DROP: 5 SOLUTION OPHTHALMIC at 07:34

## 2020-07-01 ENCOUNTER — PREP FOR SURGERY (OUTPATIENT)
Dept: OTHER | Facility: HOSPITAL | Age: 75
End: 2020-07-01

## 2020-07-01 DIAGNOSIS — Z11.59 SPECIAL SCREENING EXAMINATION FOR UNSPECIFIED VIRAL DISEASE: Primary | ICD-10-CM

## 2020-07-01 DIAGNOSIS — H25.12 NUCLEAR SCLEROTIC CATARACT OF LEFT EYE: Primary | ICD-10-CM

## 2020-07-01 RX ORDER — PHENYLEPHRINE HYDROCHLORIDE 100 MG/ML
1 SOLUTION/ DROPS OPHTHALMIC
Status: CANCELLED | OUTPATIENT
Start: 2020-07-09 | End: 2020-07-09

## 2020-07-01 RX ORDER — PREDNISOLONE ACETATE 10 MG/ML
1 SUSPENSION/ DROPS OPHTHALMIC SEE ADMIN INSTRUCTIONS
Status: CANCELLED | OUTPATIENT
Start: 2020-07-09

## 2020-07-01 RX ORDER — SODIUM CHLORIDE 0.9 % (FLUSH) 0.9 %
3 SYRINGE (ML) INJECTION EVERY 12 HOURS SCHEDULED
Status: CANCELLED | OUTPATIENT
Start: 2020-07-09

## 2020-07-01 RX ORDER — SODIUM CHLORIDE 0.9 % (FLUSH) 0.9 %
1-10 SYRINGE (ML) INJECTION AS NEEDED
Status: CANCELLED | OUTPATIENT
Start: 2020-07-09

## 2020-07-01 RX ORDER — TETRACAINE HYDROCHLORIDE 5 MG/ML
1 SOLUTION OPHTHALMIC SEE ADMIN INSTRUCTIONS
Status: CANCELLED | OUTPATIENT
Start: 2020-07-09

## 2020-07-01 RX ORDER — CYCLOPENTOLATE HYDROCHLORIDE 20 MG/ML
1 SOLUTION/ DROPS OPHTHALMIC
Status: CANCELLED | OUTPATIENT
Start: 2020-07-09 | End: 2020-07-09

## 2020-07-06 ENCOUNTER — LAB (OUTPATIENT)
Dept: LAB | Facility: HOSPITAL | Age: 75
End: 2020-07-06

## 2020-07-06 DIAGNOSIS — Z11.59 SPECIAL SCREENING EXAMINATION FOR UNSPECIFIED VIRAL DISEASE: ICD-10-CM

## 2020-07-06 PROBLEM — H25.12 NUCLEAR SCLEROTIC CATARACT OF LEFT EYE: Status: ACTIVE | Noted: 2020-07-06

## 2020-07-06 PROCEDURE — C9803 HOPD COVID-19 SPEC COLLECT: HCPCS

## 2020-07-06 PROCEDURE — U0004 COV-19 TEST NON-CDC HGH THRU: HCPCS

## 2020-07-06 PROCEDURE — U0002 COVID-19 LAB TEST NON-CDC: HCPCS

## 2020-07-07 LAB
REF LAB TEST METHOD: NORMAL
SARS-COV-2 RNA RESP QL NAA+PROBE: NOT DETECTED

## 2020-07-09 ENCOUNTER — HOSPITAL ENCOUNTER (OUTPATIENT)
Facility: HOSPITAL | Age: 75
Setting detail: HOSPITAL OUTPATIENT SURGERY
Discharge: HOME | End: 2020-07-09
Attending: OPHTHALMOLOGY | Admitting: OPHTHALMOLOGY

## 2020-07-09 ENCOUNTER — ANESTHESIA EVENT (OUTPATIENT)
Dept: PERIOP | Facility: HOSPITAL | Age: 75
End: 2020-07-09

## 2020-07-09 ENCOUNTER — ANESTHESIA (OUTPATIENT)
Dept: PERIOP | Facility: HOSPITAL | Age: 75
End: 2020-07-09

## 2020-07-09 VITALS
DIASTOLIC BLOOD PRESSURE: 79 MMHG | HEIGHT: 62 IN | SYSTOLIC BLOOD PRESSURE: 205 MMHG | OXYGEN SATURATION: 96 % | TEMPERATURE: 98 F | BODY MASS INDEX: 29.81 KG/M2 | RESPIRATION RATE: 16 BRPM | WEIGHT: 162 LBS | HEART RATE: 53 BPM

## 2020-07-09 DIAGNOSIS — H25.12 NUCLEAR SCLEROTIC CATARACT OF LEFT EYE: ICD-10-CM

## 2020-07-09 LAB — GLUCOSE BLDC GLUCOMTR-MCNC: 103 MG/DL (ref 70–130)

## 2020-07-09 PROCEDURE — V2632 POST CHMBR INTRAOCULAR LENS: HCPCS | Performed by: OPHTHALMOLOGY

## 2020-07-09 PROCEDURE — 25010000002 ONDANSETRON PER 1 MG: Performed by: NURSE ANESTHETIST, CERTIFIED REGISTERED

## 2020-07-09 PROCEDURE — 25010000002 PROPOFOL 10 MG/ML EMULSION: Performed by: NURSE ANESTHETIST, CERTIFIED REGISTERED

## 2020-07-09 PROCEDURE — 82962 GLUCOSE BLOOD TEST: CPT

## 2020-07-09 DEVICE — LENS ACRYSOF IQ SA60WF W/ULTRASERT 6X13MM ACU0T0 24.5: Type: IMPLANTABLE DEVICE | Site: POSTERIOR CHAMBER | Status: FUNCTIONAL

## 2020-07-09 RX ORDER — BALANCED SALT SOLUTION 6.4; .75; .48; .3; 3.9; 1.7 MG/ML; MG/ML; MG/ML; MG/ML; MG/ML; MG/ML
SOLUTION OPHTHALMIC AS NEEDED
Status: DISCONTINUED | OUTPATIENT
Start: 2020-07-09 | End: 2020-07-09 | Stop reason: HOSPADM

## 2020-07-09 RX ORDER — ACETAZOLAMIDE 500 MG/1
500 CAPSULE, EXTENDED RELEASE ORAL ONCE
Status: COMPLETED | OUTPATIENT
Start: 2020-07-09 | End: 2020-07-09

## 2020-07-09 RX ORDER — PREDNISOLONE ACETATE 10 MG/ML
SUSPENSION/ DROPS OPHTHALMIC AS NEEDED
Status: DISCONTINUED | OUTPATIENT
Start: 2020-07-09 | End: 2020-07-09 | Stop reason: HOSPADM

## 2020-07-09 RX ORDER — TETRACAINE HYDROCHLORIDE 5 MG/ML
SOLUTION OPHTHALMIC AS NEEDED
Status: DISCONTINUED | OUTPATIENT
Start: 2020-07-09 | End: 2020-07-09 | Stop reason: HOSPADM

## 2020-07-09 RX ORDER — ONDANSETRON 2 MG/ML
INJECTION INTRAMUSCULAR; INTRAVENOUS AS NEEDED
Status: DISCONTINUED | OUTPATIENT
Start: 2020-07-09 | End: 2020-07-09 | Stop reason: SURG

## 2020-07-09 RX ORDER — KETAMINE HYDROCHLORIDE 50 MG/ML
INJECTION, SOLUTION, CONCENTRATE INTRAMUSCULAR; INTRAVENOUS AS NEEDED
Status: DISCONTINUED | OUTPATIENT
Start: 2020-07-09 | End: 2020-07-09 | Stop reason: SURG

## 2020-07-09 RX ORDER — SODIUM CHLORIDE 0.9 % (FLUSH) 0.9 %
3 SYRINGE (ML) INJECTION EVERY 12 HOURS SCHEDULED
Status: DISCONTINUED | OUTPATIENT
Start: 2020-07-09 | End: 2020-07-09 | Stop reason: HOSPADM

## 2020-07-09 RX ORDER — SODIUM CHLORIDE 0.9 % (FLUSH) 0.9 %
1-10 SYRINGE (ML) INJECTION AS NEEDED
Status: DISCONTINUED | OUTPATIENT
Start: 2020-07-09 | End: 2020-07-09 | Stop reason: HOSPADM

## 2020-07-09 RX ORDER — LIDOCAINE HYDROCHLORIDE 40 MG/ML
INJECTION, SOLUTION RETROBULBAR; TOPICAL AS NEEDED
Status: DISCONTINUED | OUTPATIENT
Start: 2020-07-09 | End: 2020-07-09 | Stop reason: HOSPADM

## 2020-07-09 RX ORDER — PROPOFOL 10 MG/ML
VIAL (ML) INTRAVENOUS AS NEEDED
Status: DISCONTINUED | OUTPATIENT
Start: 2020-07-09 | End: 2020-07-09 | Stop reason: SURG

## 2020-07-09 RX ORDER — CYCLOPENTOLATE HYDROCHLORIDE 20 MG/ML
1 SOLUTION/ DROPS OPHTHALMIC
Status: COMPLETED | OUTPATIENT
Start: 2020-07-09 | End: 2020-07-09

## 2020-07-09 RX ORDER — PREDNISOLONE ACETATE 10 MG/ML
SUSPENSION/ DROPS OPHTHALMIC
Qty: 2 ML | Refills: 0
Start: 2020-07-09 | End: 2021-04-08

## 2020-07-09 RX ORDER — SODIUM CHLORIDE, SODIUM LACTATE, POTASSIUM CHLORIDE, CALCIUM CHLORIDE 600; 310; 30; 20 MG/100ML; MG/100ML; MG/100ML; MG/100ML
1000 INJECTION, SOLUTION INTRAVENOUS CONTINUOUS
Status: DISCONTINUED | OUTPATIENT
Start: 2020-07-09 | End: 2020-07-09 | Stop reason: HOSPADM

## 2020-07-09 RX ORDER — PREDNISOLONE ACETATE 10 MG/ML
1 SUSPENSION/ DROPS OPHTHALMIC SEE ADMIN INSTRUCTIONS
Status: DISCONTINUED | OUTPATIENT
Start: 2020-07-09 | End: 2020-07-09 | Stop reason: HOSPADM

## 2020-07-09 RX ORDER — TETRACAINE HYDROCHLORIDE 5 MG/ML
1 SOLUTION OPHTHALMIC SEE ADMIN INSTRUCTIONS
Status: COMPLETED | OUTPATIENT
Start: 2020-07-09 | End: 2020-07-09

## 2020-07-09 RX ORDER — PHENYLEPHRINE HYDROCHLORIDE 100 MG/ML
1 SOLUTION/ DROPS OPHTHALMIC
Status: COMPLETED | OUTPATIENT
Start: 2020-07-09 | End: 2020-07-09

## 2020-07-09 RX ADMIN — TETRACAINE HYDROCHLORIDE 1 DROP: 5 SOLUTION OPHTHALMIC at 12:02

## 2020-07-09 RX ADMIN — CYCLOPENTOLATE HYDROCHLORIDE 1 DROP: 20 SOLUTION/ DROPS OPHTHALMIC at 12:08

## 2020-07-09 RX ADMIN — PROPOFOL 50 MG: 10 INJECTION, EMULSION INTRAVENOUS at 13:57

## 2020-07-09 RX ADMIN — PHENYLEPHRINE HYDROCHLORIDE 1 DROP: 100 SOLUTION/ DROPS OPHTHALMIC at 12:13

## 2020-07-09 RX ADMIN — KETAMINE HYDROCHLORIDE 20 MG: 50 INJECTION, SOLUTION INTRAMUSCULAR; INTRAVENOUS at 13:49

## 2020-07-09 RX ADMIN — PHENYLEPHRINE HYDROCHLORIDE 1 DROP: 100 SOLUTION/ DROPS OPHTHALMIC at 12:18

## 2020-07-09 RX ADMIN — SODIUM CHLORIDE, POTASSIUM CHLORIDE, SODIUM LACTATE AND CALCIUM CHLORIDE 1000 ML: 600; 310; 30; 20 INJECTION, SOLUTION INTRAVENOUS at 12:22

## 2020-07-09 RX ADMIN — CYCLOPENTOLATE HYDROCHLORIDE 1 DROP: 20 SOLUTION/ DROPS OPHTHALMIC at 12:18

## 2020-07-09 RX ADMIN — PROPOFOL 50 MG: 10 INJECTION, EMULSION INTRAVENOUS at 13:49

## 2020-07-09 RX ADMIN — CYCLOPENTOLATE HYDROCHLORIDE 1 DROP: 20 SOLUTION/ DROPS OPHTHALMIC at 12:13

## 2020-07-09 RX ADMIN — ONDANSETRON 4 MG: 2 INJECTION INTRAMUSCULAR; INTRAVENOUS at 13:49

## 2020-07-09 RX ADMIN — ACETAZOLAMIDE 500 MG: 500 CAPSULE, EXTENDED RELEASE ORAL at 14:10

## 2020-07-09 RX ADMIN — PHENYLEPHRINE HYDROCHLORIDE 1 DROP: 100 SOLUTION/ DROPS OPHTHALMIC at 12:08

## 2020-07-09 RX ADMIN — TETRACAINE HYDROCHLORIDE 1 DROP: 5 SOLUTION OPHTHALMIC at 12:03

## 2020-07-13 ENCOUNTER — HOSPITAL ENCOUNTER (OUTPATIENT)
Facility: HOSPITAL | Age: 75
Discharge: HOME | End: 2020-07-13
Payer: MEDICARE

## 2020-07-13 LAB
A/G RATIO: 1.7 (ref 0.8–2)
ALBUMIN SERPL-MCNC: 4.1 G/DL (ref 3.4–4.8)
ALP BLD-CCNC: 102 U/L (ref 25–100)
ALT SERPL-CCNC: 16 U/L (ref 4–36)
ANION GAP SERPL CALCULATED.3IONS-SCNC: 9 MMOL/L (ref 3–16)
AST SERPL-CCNC: 14 U/L (ref 8–33)
BILIRUB SERPL-MCNC: 0.5 MG/DL (ref 0.3–1.2)
BUN BLDV-MCNC: 24 MG/DL (ref 6–20)
CALCIUM SERPL-MCNC: 9.1 MG/DL (ref 8.5–10.5)
CHLORIDE BLD-SCNC: 109 MMOL/L (ref 98–107)
CHOLESTEROL, TOTAL: 148 MG/DL (ref 0–200)
CO2: 25 MMOL/L (ref 20–30)
CREAT SERPL-MCNC: 1 MG/DL (ref 0.4–1.2)
FOLATE: 12.87 NG/ML
GFR AFRICAN AMERICAN: >59
GFR NON-AFRICAN AMERICAN: 54
GLOBULIN: 2.4 G/DL
GLUCOSE BLD-MCNC: 109 MG/DL (ref 74–106)
HBA1C MFR BLD: 6.6 %
HCT VFR BLD CALC: 38.9 % (ref 37–47)
HDLC SERPL-MCNC: 46 MG/DL (ref 40–60)
HEMOGLOBIN: 12 G/DL (ref 11.5–16.5)
LDL CHOLESTEROL CALCULATED: 69 MG/DL
MCH RBC QN AUTO: 28.2 PG (ref 27–32)
MCHC RBC AUTO-ENTMCNC: 30.8 G/DL (ref 31–35)
MCV RBC AUTO: 91.5 FL (ref 80–100)
PDW BLD-RTO: 13.2 % (ref 11–16)
PLATELET # BLD: 236 K/UL (ref 150–400)
PMV BLD AUTO: 11.7 FL (ref 6–10)
POTASSIUM SERPL-SCNC: 4.3 MMOL/L (ref 3.4–5.1)
RBC # BLD: 4.25 M/UL (ref 3.8–5.8)
SODIUM BLD-SCNC: 143 MMOL/L (ref 136–145)
TOTAL PROTEIN: 6.5 G/DL (ref 6.4–8.3)
TRIGL SERPL-MCNC: 166 MG/DL (ref 0–249)
TSH SERPL DL<=0.05 MIU/L-ACNC: 3.25 UIU/ML (ref 0.35–5.5)
VITAMIN B-12: 556 PG/ML (ref 211–911)
VITAMIN D 25-HYDROXY: 26 (ref 32–100)
VLDLC SERPL CALC-MCNC: 33 MG/DL
WBC # BLD: 6.7 K/UL (ref 4–11)

## 2020-07-13 PROCEDURE — 85027 COMPLETE CBC AUTOMATED: CPT

## 2020-07-13 PROCEDURE — 82607 VITAMIN B-12: CPT

## 2020-07-13 PROCEDURE — 83036 HEMOGLOBIN GLYCOSYLATED A1C: CPT

## 2020-07-13 PROCEDURE — 82746 ASSAY OF FOLIC ACID SERUM: CPT

## 2020-07-13 PROCEDURE — 80053 COMPREHEN METABOLIC PANEL: CPT

## 2020-07-13 PROCEDURE — 82306 VITAMIN D 25 HYDROXY: CPT

## 2020-07-13 PROCEDURE — 80061 LIPID PANEL: CPT

## 2020-07-13 PROCEDURE — 84443 ASSAY THYROID STIM HORMONE: CPT

## 2020-07-15 ENCOUNTER — OFFICE VISIT (OUTPATIENT)
Dept: PRIMARY CARE CLINIC | Age: 75
End: 2020-07-15
Payer: MEDICARE

## 2020-07-15 VITALS
HEIGHT: 62 IN | HEART RATE: 62 BPM | BODY MASS INDEX: 29.04 KG/M2 | TEMPERATURE: 97.8 F | RESPIRATION RATE: 16 BRPM | DIASTOLIC BLOOD PRESSURE: 80 MMHG | SYSTOLIC BLOOD PRESSURE: 158 MMHG | WEIGHT: 157.8 LBS | OXYGEN SATURATION: 97 %

## 2020-07-15 PROCEDURE — 3017F COLORECTAL CA SCREEN DOC REV: CPT | Performed by: NURSE PRACTITIONER

## 2020-07-15 PROCEDURE — 1123F ACP DISCUSS/DSCN MKR DOCD: CPT | Performed by: NURSE PRACTITIONER

## 2020-07-15 PROCEDURE — 3044F HG A1C LEVEL LT 7.0%: CPT | Performed by: NURSE PRACTITIONER

## 2020-07-15 PROCEDURE — 4040F PNEUMOC VAC/ADMIN/RCVD: CPT | Performed by: NURSE PRACTITIONER

## 2020-07-15 PROCEDURE — G8417 CALC BMI ABV UP PARAM F/U: HCPCS | Performed by: NURSE PRACTITIONER

## 2020-07-15 PROCEDURE — G8399 PT W/DXA RESULTS DOCUMENT: HCPCS | Performed by: NURSE PRACTITIONER

## 2020-07-15 PROCEDURE — 1090F PRES/ABSN URINE INCON ASSESS: CPT | Performed by: NURSE PRACTITIONER

## 2020-07-15 PROCEDURE — 82044 UR ALBUMIN SEMIQUANTITATIVE: CPT | Performed by: NURSE PRACTITIONER

## 2020-07-15 PROCEDURE — 2022F DILAT RTA XM EVC RTNOPTHY: CPT | Performed by: NURSE PRACTITIONER

## 2020-07-15 PROCEDURE — 99214 OFFICE O/P EST MOD 30 MIN: CPT | Performed by: NURSE PRACTITIONER

## 2020-07-15 PROCEDURE — 1036F TOBACCO NON-USER: CPT | Performed by: NURSE PRACTITIONER

## 2020-07-15 PROCEDURE — G8427 DOCREV CUR MEDS BY ELIG CLIN: HCPCS | Performed by: NURSE PRACTITIONER

## 2020-07-15 RX ORDER — NITROGLYCERIN 0.4 MG/1
0.4 TABLET SUBLINGUAL PRN
Qty: 25 TABLET | Refills: 3 | Status: SHIPPED | OUTPATIENT
Start: 2020-07-15 | End: 2021-12-01 | Stop reason: SDUPTHER

## 2020-07-15 RX ORDER — TETANUS AND DIPHTHERIA TOXOIDS ADSORBED 2; 2 [LF]/.5ML; [LF]/.5ML
0.5 INJECTION INTRAMUSCULAR ONCE
Qty: 0.5 ML | Refills: 0 | Status: SHIPPED | OUTPATIENT
Start: 2020-07-15 | End: 2020-07-15

## 2020-07-15 RX ORDER — FLUTICASONE PROPIONATE 50 MCG
SPRAY, SUSPENSION (ML) NASAL
Qty: 16 G | Refills: 3 | Status: SHIPPED | OUTPATIENT
Start: 2020-07-15 | End: 2021-03-10 | Stop reason: SDUPTHER

## 2020-07-15 RX ORDER — ESOMEPRAZOLE MAGNESIUM 40 MG/1
CAPSULE, DELAYED RELEASE ORAL
Qty: 90 CAPSULE | Refills: 3 | Status: SHIPPED | OUTPATIENT
Start: 2020-07-15 | End: 2020-11-09 | Stop reason: SDUPTHER

## 2020-07-15 RX ORDER — ERGOCALCIFEROL (VITAMIN D2) 1250 MCG
50000 CAPSULE ORAL WEEKLY
Qty: 12 CAPSULE | Refills: 3 | Status: SHIPPED | OUTPATIENT
Start: 2020-07-15 | End: 2021-03-10 | Stop reason: SDUPTHER

## 2020-07-15 RX ORDER — LISINOPRIL 30 MG/1
30 TABLET ORAL DAILY
Qty: 90 TABLET | Refills: 3 | Status: SHIPPED | OUTPATIENT
Start: 2020-07-15 | End: 2020-11-09 | Stop reason: SDUPTHER

## 2020-07-15 RX ORDER — FEXOFENADINE HCL 180 MG/1
180 TABLET ORAL DAILY
Qty: 90 TABLET | Refills: 3 | Status: SHIPPED | OUTPATIENT
Start: 2020-07-15 | End: 2021-03-10 | Stop reason: SDUPTHER

## 2020-07-15 RX ORDER — EMPAGLIFLOZIN 10 MG/1
10 TABLET, FILM COATED ORAL DAILY
Qty: 90 TABLET | Refills: 0 | Status: SHIPPED | OUTPATIENT
Start: 2020-07-15 | End: 2020-11-09 | Stop reason: ALTCHOICE

## 2020-07-15 RX ORDER — LEVOTHYROXINE SODIUM 0.03 MG/1
TABLET ORAL
Qty: 90 TABLET | Refills: 3 | Status: SHIPPED | OUTPATIENT
Start: 2020-07-15 | End: 2020-11-09 | Stop reason: SDUPTHER

## 2020-07-15 RX ORDER — DONEPEZIL HYDROCHLORIDE 10 MG/1
10 TABLET, FILM COATED ORAL NIGHTLY
Qty: 90 TABLET | Refills: 1 | Status: SHIPPED | OUTPATIENT
Start: 2020-07-15 | End: 2020-11-09 | Stop reason: SDUPTHER

## 2020-07-15 RX ORDER — ATORVASTATIN CALCIUM 20 MG/1
20 TABLET, FILM COATED ORAL DAILY
Qty: 90 TABLET | Refills: 3 | Status: SHIPPED | OUTPATIENT
Start: 2020-07-15 | End: 2021-03-10 | Stop reason: SDUPTHER

## 2020-07-15 RX ORDER — CYANOCOBALAMIN 1000 UG/ML
1000 INJECTION INTRAMUSCULAR; SUBCUTANEOUS ONCE
Status: SHIPPED | OUTPATIENT
Start: 2020-07-15

## 2020-07-15 RX ORDER — BLOOD SUGAR DIAGNOSTIC
STRIP MISCELLANEOUS
Qty: 300 STRIP | Refills: 3 | Status: SHIPPED | OUTPATIENT
Start: 2020-07-15 | End: 2020-11-09 | Stop reason: SDUPTHER

## 2020-07-15 RX ORDER — ALENDRONATE SODIUM 70 MG/1
TABLET ORAL
Qty: 12 TABLET | Refills: 3 | Status: SHIPPED | OUTPATIENT
Start: 2020-07-15 | End: 2020-11-09 | Stop reason: SDUPTHER

## 2020-07-15 RX ORDER — MONTELUKAST SODIUM 10 MG/1
TABLET ORAL
Qty: 90 TABLET | Refills: 3 | Status: SHIPPED | OUTPATIENT
Start: 2020-07-15 | End: 2021-03-10 | Stop reason: SDUPTHER

## 2020-07-15 RX ORDER — DONEPEZIL HYDROCHLORIDE 5 MG/1
5 TABLET, FILM COATED ORAL NIGHTLY
Qty: 90 TABLET | Refills: 3 | Status: SHIPPED | OUTPATIENT
Start: 2020-07-15 | End: 2020-11-09 | Stop reason: ALTCHOICE

## 2020-07-15 RX ORDER — ATENOLOL 50 MG/1
TABLET ORAL
Qty: 90 TABLET | Refills: 0 | Status: SHIPPED | OUTPATIENT
Start: 2020-07-15 | End: 2020-11-09 | Stop reason: SDUPTHER

## 2020-07-15 RX ORDER — ESCITALOPRAM OXALATE 10 MG/1
TABLET ORAL
Qty: 90 TABLET | Refills: 3 | Status: SHIPPED | OUTPATIENT
Start: 2020-07-15 | End: 2021-03-10 | Stop reason: SDUPTHER

## 2020-07-15 RX ORDER — ASPIRIN 81 MG/1
81 TABLET ORAL DAILY
Qty: 90 TABLET | Refills: 3 | Status: SHIPPED | OUTPATIENT
Start: 2020-07-15 | End: 2022-06-23 | Stop reason: SDUPTHER

## 2020-07-15 ASSESSMENT — ENCOUNTER SYMPTOMS
GASTROINTESTINAL NEGATIVE: 1
RESPIRATORY NEGATIVE: 1

## 2020-07-15 NOTE — PROGRESS NOTES
reviewed. Constitutional:       Appearance: She is well-developed. HENT:      Head: Normocephalic and atraumatic. Eyes:      Conjunctiva/sclera: Conjunctivae normal.      Pupils: Pupils are equal, round, and reactive to light. Neck:      Musculoskeletal: Normal range of motion and neck supple. Thyroid: No thyromegaly. Vascular: No JVD. Cardiovascular:      Rate and Rhythm: Normal rate and regular rhythm. Heart sounds: No murmur. No friction rub. No gallop. Pulmonary:      Effort: Pulmonary effort is normal. No respiratory distress. Breath sounds: Normal breath sounds. No wheezing or rales. Abdominal:      General: Bowel sounds are normal. There is no distension. Palpations: Abdomen is soft. Tenderness: There is no abdominal tenderness. There is no guarding. Musculoskeletal:         General: No tenderness. Skin:     General: Skin is warm and dry. Findings: No rash. Neurological:      Mental Status: She is alert and oriented to person, place, and time. Psychiatric:         Cognition and Memory: She exhibits impaired recent memory.          Judgment: Judgment normal.         Results in Past 30 Days  Result Component Current Result Ref Range Previous Result Ref Range   Alb 4.1 (7/13/2020) 3.4 - 4.8 g/dL Not in Time Range    Albumin/Globulin Ratio 1.7 (7/13/2020) 0.8 - 2.0 Not in Time Range    Alkaline Phosphatase 102 (H) (7/13/2020) 25 - 100 U/L Not in Time Range    ALT 16 (7/13/2020) 4 - 36 U/L Not in Time Range    AST 14 (7/13/2020) 8 - 33 U/L Not in Time Range    BUN 24 (H) (7/13/2020) 6 - 20 mg/dL Not in Time Range    Calcium 9.1 (7/13/2020) 8.5 - 10.5 mg/dL Not in Time Range    Chloride 109 (H) (7/13/2020) 98 - 107 mmol/L Not in Time Range    CO2 25 (7/13/2020) 20 - 30 mmol/L Not in Time Range    CREATININE 1.0 (7/13/2020) 0.4 - 1.2 mg/dL Not in Time Range    GFR  >59 (7/13/2020) >59 Not in Time Range    GFR Non- 54 (L) hypothyroidism  -     levothyroxine (SYNTHROID) 25 MCG tablet; take 1 tablet by mouth once daily    Allergic rhinitis, unspecified seasonality, unspecified trigger  -     fluticasone (FLONASE) 50 MCG/ACT nasal spray; instill 2 sprays into each nostril once daily. 90 day supply  -     fexofenadine (ALLEGRA) 180 MG tablet; Take 1 tablet by mouth daily    Gastroenteritis  -     esomeprazole (NEXIUM) 40 MG delayed release capsule; take 1 capsule by mouth every morning BEFORE BREAKFAST    Depression, unspecified depression type  -     escitalopram (LEXAPRO) 10 MG tablet; take 1 tablet by mouth once daily    Vitamin D deficiency  -     ergocalciferol (DRISDOL) 1.25 MG (99162 UT) capsule; Take 1 capsule by mouth once a week 90 day supply    Essential hypertension  -     aspirin 81 MG EC tablet;  Take 1 tablet by mouth daily    Osteoporosis, unspecified osteoporosis type, unspecified pathological fracture presence  -     alendronate (FOSAMAX) 70 MG tablet; take 1 tablet by mouth every week    Other orders  -     cyanocobalamin injection 1,000 mcg  -     nitroGLYCERIN (NITROSTAT) 0.4 MG SL tablet; Place 1 tablet under the tongue as needed for Chest pain  -     montelukast (SINGULAIR) 10 MG tablet; take 1 tablet by mouth at bedtime  -     blood glucose test strips (ACCU-CHEK TAYLA PLUS) strip; use 1 three times a day      Medications Discontinued During This Encounter   Medication Reason    Blood Glucose Monitoring Suppl (ACCU-CHEK TAYLA PLUS) W/DEVICE KIT LIST CLEANUP    atenolol (TENORMIN) 50 MG tablet REORDER    empagliflozin (JARDIANCE) 10 MG tablet REORDER    donepezil (ARICEPT) 5 MG tablet REORDER    lisinopril (PRINIVIL;ZESTRIL) 30 MG tablet REORDER    nystatin-triamcinolone (MYCOLOG II) 809985-6.1 UNIT/GM-% cream REORDER    nitroGLYCERIN (NITROSTAT) 0.4 MG SL tablet REORDER    montelukast (SINGULAIR) 10 MG tablet REORDER    metFORMIN (GLUCOPHAGE) 500 MG tablet REORDER    levothyroxine (SYNTHROID) 25 MCG tablet REORDER    fluticasone (FLONASE) 50 MCG/ACT nasal spray REORDER    fexofenadine (ALLEGRA) 180 MG tablet REORDER    esomeprazole (NEXIUM) 40 MG delayed release capsule REORDER    escitalopram (LEXAPRO) 10 MG tablet REORDER    ergocalciferol (DRISDOL) 1.25 MG (80346 UT) capsule REORDER    blood glucose test strips (ACCU-CHEK TAYLA PLUS) strip REORDER    atorvastatin (LIPITOR) 20 MG tablet REORDER    aspirin 81 MG EC tablet REORDER    alendronate (FOSAMAX) 70 MG tablet REORDER    diptheria-tetanus toxoids (DECAVAC) 2-2 LF/0.5ML injection Cost of medication

## 2020-11-09 ENCOUNTER — OFFICE VISIT (OUTPATIENT)
Dept: PRIMARY CARE CLINIC | Age: 75
End: 2020-11-09
Payer: MEDICARE

## 2020-11-09 ENCOUNTER — HOSPITAL ENCOUNTER (OUTPATIENT)
Facility: HOSPITAL | Age: 75
Discharge: HOME | End: 2020-11-09
Payer: MEDICARE

## 2020-11-09 VITALS
BODY MASS INDEX: 28.71 KG/M2 | HEIGHT: 62 IN | TEMPERATURE: 98.1 F | DIASTOLIC BLOOD PRESSURE: 86 MMHG | HEART RATE: 67 BPM | WEIGHT: 156 LBS | SYSTOLIC BLOOD PRESSURE: 146 MMHG | OXYGEN SATURATION: 96 %

## 2020-11-09 PROCEDURE — 1036F TOBACCO NON-USER: CPT | Performed by: NURSE PRACTITIONER

## 2020-11-09 PROCEDURE — 3044F HG A1C LEVEL LT 7.0%: CPT | Performed by: NURSE PRACTITIONER

## 2020-11-09 PROCEDURE — 2022F DILAT RTA XM EVC RTNOPTHY: CPT | Performed by: NURSE PRACTITIONER

## 2020-11-09 PROCEDURE — G8399 PT W/DXA RESULTS DOCUMENT: HCPCS | Performed by: NURSE PRACTITIONER

## 2020-11-09 PROCEDURE — 85027 COMPLETE CBC AUTOMATED: CPT

## 2020-11-09 PROCEDURE — G8417 CALC BMI ABV UP PARAM F/U: HCPCS | Performed by: NURSE PRACTITIONER

## 2020-11-09 PROCEDURE — 84443 ASSAY THYROID STIM HORMONE: CPT

## 2020-11-09 PROCEDURE — 83036 HEMOGLOBIN GLYCOSYLATED A1C: CPT

## 2020-11-09 PROCEDURE — G8427 DOCREV CUR MEDS BY ELIG CLIN: HCPCS | Performed by: NURSE PRACTITIONER

## 2020-11-09 PROCEDURE — 1123F ACP DISCUSS/DSCN MKR DOCD: CPT | Performed by: NURSE PRACTITIONER

## 2020-11-09 PROCEDURE — 82746 ASSAY OF FOLIC ACID SERUM: CPT

## 2020-11-09 PROCEDURE — 99214 OFFICE O/P EST MOD 30 MIN: CPT | Performed by: NURSE PRACTITIONER

## 2020-11-09 PROCEDURE — 82607 VITAMIN B-12: CPT

## 2020-11-09 PROCEDURE — 80053 COMPREHEN METABOLIC PANEL: CPT

## 2020-11-09 PROCEDURE — 4040F PNEUMOC VAC/ADMIN/RCVD: CPT | Performed by: NURSE PRACTITIONER

## 2020-11-09 PROCEDURE — 1090F PRES/ABSN URINE INCON ASSESS: CPT | Performed by: NURSE PRACTITIONER

## 2020-11-09 PROCEDURE — 3017F COLORECTAL CA SCREEN DOC REV: CPT | Performed by: NURSE PRACTITIONER

## 2020-11-09 PROCEDURE — G8484 FLU IMMUNIZE NO ADMIN: HCPCS | Performed by: NURSE PRACTITIONER

## 2020-11-09 PROCEDURE — 82306 VITAMIN D 25 HYDROXY: CPT

## 2020-11-09 RX ORDER — LISINOPRIL 30 MG/1
30 TABLET ORAL DAILY
Qty: 90 TABLET | Refills: 3 | Status: SHIPPED | OUTPATIENT
Start: 2020-11-09 | End: 2021-03-10 | Stop reason: ALTCHOICE

## 2020-11-09 RX ORDER — ALENDRONATE SODIUM 70 MG/1
TABLET ORAL
Qty: 12 TABLET | Refills: 3 | Status: SHIPPED | OUTPATIENT
Start: 2020-11-09 | End: 2021-03-10 | Stop reason: SDUPTHER

## 2020-11-09 RX ORDER — ESOMEPRAZOLE MAGNESIUM 40 MG/1
CAPSULE, DELAYED RELEASE ORAL
Qty: 90 CAPSULE | Refills: 3 | Status: SHIPPED | OUTPATIENT
Start: 2020-11-09 | End: 2021-03-10 | Stop reason: SDUPTHER

## 2020-11-09 RX ORDER — LEVOTHYROXINE SODIUM 0.03 MG/1
TABLET ORAL
Qty: 90 TABLET | Refills: 3 | Status: SHIPPED | OUTPATIENT
Start: 2020-11-09 | End: 2021-03-10 | Stop reason: SDUPTHER

## 2020-11-09 RX ORDER — ATENOLOL 50 MG/1
TABLET ORAL
Qty: 90 TABLET | Refills: 0 | Status: SHIPPED | OUTPATIENT
Start: 2020-11-09 | End: 2021-03-10 | Stop reason: SDUPTHER

## 2020-11-09 RX ORDER — DONEPEZIL HYDROCHLORIDE 10 MG/1
10 TABLET, FILM COATED ORAL NIGHTLY
Qty: 90 TABLET | Refills: 1 | Status: SHIPPED | OUTPATIENT
Start: 2020-11-09 | End: 2021-03-10 | Stop reason: SDUPTHER

## 2020-11-09 RX ORDER — BLOOD SUGAR DIAGNOSTIC
STRIP MISCELLANEOUS
Qty: 300 STRIP | Refills: 3 | Status: SHIPPED | OUTPATIENT
Start: 2020-11-09 | End: 2021-03-10 | Stop reason: SDUPTHER

## 2020-11-09 ASSESSMENT — PATIENT HEALTH QUESTIONNAIRE - PHQ9
SUM OF ALL RESPONSES TO PHQ QUESTIONS 1-9: 0
SUM OF ALL RESPONSES TO PHQ QUESTIONS 1-9: 0
2. FEELING DOWN, DEPRESSED OR HOPELESS: 0
SUM OF ALL RESPONSES TO PHQ9 QUESTIONS 1 & 2: 0
1. LITTLE INTEREST OR PLEASURE IN DOING THINGS: 0
SUM OF ALL RESPONSES TO PHQ QUESTIONS 1-9: 0

## 2020-11-09 ASSESSMENT — ENCOUNTER SYMPTOMS
GASTROINTESTINAL NEGATIVE: 1
RESPIRATORY NEGATIVE: 1

## 2020-11-09 NOTE — PROGRESS NOTES
SUBJECTIVE:    Patient ID: Elvis Cisse is a 70 y.o. female. Chief Complaint   Patient presents with    Diabetes         HPI:  She is following up on her diabetes and htn. She says her taking her medication   She is having some hypoglycermic issues. In the am it is . She says when her blood sugar goes low she gets weak in her feet and legs. Patient's medications,allergies, past medical, surgical, social and family histories were reviewed and updated as appropriate. .  Current Outpatient Medications on File Prior to Visit   Medication Sig Dispense Refill    nystatin-triamcinolone (MYCOLOG II) 900185-7.1 UNIT/GM-% cream Apply topically 2 times daily. 60 g 3    nitroGLYCERIN (NITROSTAT) 0.4 MG SL tablet Place 1 tablet under the tongue as needed for Chest pain 25 tablet 3    montelukast (SINGULAIR) 10 MG tablet take 1 tablet by mouth at bedtime 90 tablet 3    fluticasone (FLONASE) 50 MCG/ACT nasal spray instill 2 sprays into each nostril once daily. 90 day supply 16 g 3    fexofenadine (ALLEGRA) 180 MG tablet Take 1 tablet by mouth daily 90 tablet 3    escitalopram (LEXAPRO) 10 MG tablet take 1 tablet by mouth once daily 90 tablet 3    ergocalciferol (DRISDOL) 1.25 MG (43452 UT) capsule Take 1 capsule by mouth once a week 90 day supply 12 capsule 3    atorvastatin (LIPITOR) 20 MG tablet Take 1 tablet by mouth daily 90 tablet 3    aspirin 81 MG EC tablet Take 1 tablet by mouth daily 90 tablet 3    ACCU-CHEK MULTICLIX LANCETS MISC Finger stick tid 100 each 3    Biotin 5000 MCG TABS Take by mouth      Lactobacillus (PROBIOTIC ACIDOPHILUS PO) Take by mouth      calcium carbonate 600 MG TABS tablet Take 1 tablet by mouth daily.        Current Facility-Administered Medications on File Prior to Visit   Medication Dose Route Frequency Provider Last Rate Last Dose    cyanocobalamin injection 1,000 mcg  1,000 mcg Intramuscular Once MICKI Ayala        cloNIDine (CATAPRES) tablet 0.1 mg 0.1 mg Oral BID MICKI Vuong        cyanocobalamin injection 1,000 mcg  1,000 mcg Intramuscular Once [employer], DO           Review of Systems   Constitutional: Negative. HENT: Negative. Respiratory: Negative. Cardiovascular: Negative. Gastrointestinal: Negative. Genitourinary: Negative. Musculoskeletal: Negative. Skin: Negative. Neurological: Positive for weakness. Psychiatric/Behavioral: Negative. OBJECTIVE:  BP (!) 146/86 (Site: Right Upper Arm, Position: Sitting, Cuff Size: Medium Adult)   Pulse 67   Temp 98.1 °F (36.7 °C)   Ht 5' 2\" (1.575 m)   Wt 156 lb (70.8 kg)   SpO2 96%   BMI 28.53 kg/m²    Physical Exam  Vitals signs and nursing note reviewed. Constitutional:       Appearance: She is well-developed. HENT:      Head: Normocephalic and atraumatic. Eyes:      Conjunctiva/sclera: Conjunctivae normal.      Pupils: Pupils are equal, round, and reactive to light. Neck:      Musculoskeletal: Normal range of motion and neck supple. Thyroid: No thyromegaly. Vascular: No JVD. Cardiovascular:      Rate and Rhythm: Normal rate and regular rhythm. Heart sounds: No murmur. No friction rub. No gallop. Pulmonary:      Effort: Pulmonary effort is normal. No respiratory distress. Breath sounds: Normal breath sounds. No wheezing or rales. Abdominal:      General: Bowel sounds are normal. There is no distension. Palpations: Abdomen is soft. Tenderness: There is no abdominal tenderness. There is no guarding. Musculoskeletal:         General: No tenderness. Skin:     General: Skin is warm and dry. Findings: No rash. Neurological:      Mental Status: She is alert and oriented to person, place, and time.    Psychiatric:         Judgment: Judgment normal.         Results in Past 30 Days  Result Component Current Result Ref Range Previous Result Ref Range   Alb 4.4 (11/9/2020) 3.4 - 4.8 g/dL Not in Time Range Albumin/Globulin Ratio 2.2 (H) (11/9/2020) 0.8 - 2.0 Not in Time Range    Alkaline Phosphatase 112 (H) (11/9/2020) 25 - 100 U/L Not in Time Range    ALT 15 (11/9/2020) 4 - 36 U/L Not in Time Range    AST 15 (11/9/2020) 8 - 33 U/L Not in Time Range    BUN 23 (H) (11/9/2020) 6 - 20 mg/dL Not in Time Range    Calcium 9.5 (11/9/2020) 8.5 - 10.5 mg/dL Not in Time Range    Chloride 103 (11/9/2020) 98 - 107 mmol/L Not in Time Range    CO2 25 (11/9/2020) 20 - 30 mmol/L Not in Time Range    CREATININE 1.0 (11/9/2020) 0.4 - 1.2 mg/dL Not in Time Range    GFR  >59 (11/9/2020) >59 Not in Time Range    GFR Non- 54 (L) (11/9/2020) >59 Not in Time Range    Globulin 2.0 (11/9/2020) g/dL Not in Time Range    Glucose 133 (H) (11/9/2020) 74 - 106 mg/dL Not in Time Range    Potassium 4.7 (11/9/2020) 3.4 - 5.1 mmol/L Not in Time Range    Sodium 141 (11/9/2020) 136 - 145 mmol/L Not in Time Range    Total Bilirubin 0.4 (11/9/2020) 0.3 - 1.2 mg/dL Not in Time Range    Total Protein 6.4 (11/9/2020) 6.4 - 8.3 g/dL Not in Time Range        Hemoglobin A1C (%)   Date Value   11/09/2020 6.4 (H)     Microalbumin, Random Urine (mg/dL)   Date Value   04/01/2019 <1.20     LDL Calculated (mg/dL)   Date Value   07/13/2020 69           Lab Results   Component Value Date    TSH 2.01 11/09/2020         ASSESSMENT/PLAN:     Frederick Huntley was seen today for diabetes. Diagnoses and all orders for this visit:    Secondary hypertension  -     lisinopril (PRINIVIL;ZESTRIL) 30 MG tablet; Take 1 tablet by mouth daily  Increase dose of lisinopril and monitor b/p. Pure hypercholesterolemia  -     atenolol (TENORMIN) 50 MG tablet; take 1 tablet by mouth once daily    Acquired hypothyroidism  -     TSH without Reflex; Future  -     levothyroxine (SYNTHROID) 25 MCG tablet; take 1 tablet by mouth once daily    Type 2 diabetes mellitus without complication, without long-term current use of insulin (HCC)  -     CBC;  Future  - Comprehensive Metabolic Panel; Future  -     Hemoglobin A1C; Future  -     metFORMIN (GLUCOPHAGE) 500 MG tablet; Take 1 tablet by mouth 2 times daily (with meals)    Vitamin D deficiency  -     Vitamin B12 & Folate; Future  -     Vitamin D 25 Hydroxy; Future    Gastroenteritis  -     esomeprazole (NEXIUM) 40 MG delayed release capsule; take 1 capsule by mouth every morning BEFORE BREAKFAST    Memory loss  -     donepezil (ARICEPT) 10 MG tablet;  Take 1 tablet by mouth nightly    Osteoporosis, unspecified osteoporosis type, unspecified pathological fracture presence  -     alendronate (FOSAMAX) 70 MG tablet; take 1 tablet by mouth every week    Other orders  -     INFLUENZA, QUADV, 3 YRS AND OLDER, IM, MDV, 0.5ML (AFLURIA QUADV)  -     blood glucose test strips (ACCU-CHEK TAYLA PLUS) strip; use 1 three times a day      Medications Discontinued During This Encounter   Medication Reason    donepezil (ARICEPT) 5 MG tablet Therapy completed    empagliflozin (JARDIANCE) 10 MG tablet Therapy completed    metFORMIN (GLUCOPHAGE) 500 MG tablet REORDER    atenolol (TENORMIN) 50 MG tablet REORDER    lisinopril (PRINIVIL;ZESTRIL) 30 MG tablet REORDER    levothyroxine (SYNTHROID) 25 MCG tablet REORDER    esomeprazole (NEXIUM) 40 MG delayed release capsule REORDER    donepezil (ARICEPT) 10 MG tablet REORDER    alendronate (FOSAMAX) 70 MG tablet REORDER    blood glucose test strips (ACCU-CHEK TAYLA PLUS) strip REORDER

## 2020-11-10 LAB
A/G RATIO: 2.2 (ref 0.8–2)
ALBUMIN SERPL-MCNC: 4.4 G/DL (ref 3.4–4.8)
ALP BLD-CCNC: 112 U/L (ref 25–100)
ALT SERPL-CCNC: 15 U/L (ref 4–36)
ANION GAP SERPL CALCULATED.3IONS-SCNC: 13 MMOL/L (ref 3–16)
AST SERPL-CCNC: 15 U/L (ref 8–33)
BILIRUB SERPL-MCNC: 0.4 MG/DL (ref 0.3–1.2)
BUN BLDV-MCNC: 23 MG/DL (ref 6–20)
CALCIUM SERPL-MCNC: 9.5 MG/DL (ref 8.5–10.5)
CHLORIDE BLD-SCNC: 103 MMOL/L (ref 98–107)
CO2: 25 MMOL/L (ref 20–30)
CREAT SERPL-MCNC: 1 MG/DL (ref 0.4–1.2)
FOLATE: 12.25 NG/ML
GFR AFRICAN AMERICAN: >59
GFR NON-AFRICAN AMERICAN: 54
GLOBULIN: 2 G/DL
GLUCOSE BLD-MCNC: 133 MG/DL (ref 74–106)
HBA1C MFR BLD: 6.4 %
HCT VFR BLD CALC: 42 % (ref 37–47)
HEMOGLOBIN: 12.7 G/DL (ref 11.5–16.5)
MCH RBC QN AUTO: 28.5 PG (ref 27–32)
MCHC RBC AUTO-ENTMCNC: 30.2 G/DL (ref 31–35)
MCV RBC AUTO: 94.2 FL (ref 80–100)
PDW BLD-RTO: 12.9 % (ref 11–16)
PLATELET # BLD: 243 K/UL (ref 150–400)
PMV BLD AUTO: 12.2 FL (ref 6–10)
POTASSIUM SERPL-SCNC: 4.7 MMOL/L (ref 3.4–5.1)
RBC # BLD: 4.46 M/UL (ref 3.8–5.8)
SODIUM BLD-SCNC: 141 MMOL/L (ref 136–145)
TOTAL PROTEIN: 6.4 G/DL (ref 6.4–8.3)
TSH SERPL DL<=0.05 MIU/L-ACNC: 2.01 UIU/ML (ref 0.35–5.5)
VITAMIN B-12: 705 PG/ML (ref 211–911)
VITAMIN D 25-HYDROXY: 29.6 (ref 32–100)
WBC # BLD: 7.1 K/UL (ref 4–11)

## 2020-12-07 ENCOUNTER — TELEPHONE (OUTPATIENT)
Dept: PRIMARY CARE CLINIC | Age: 75
End: 2020-12-07

## 2020-12-07 NOTE — TELEPHONE ENCOUNTER
Pt. Called wanting to know if she got bit by a dog is there a shot she needs to get. I tried to transfer but she hung up. Please advise.

## 2020-12-18 ENCOUNTER — NURSE ONLY (OUTPATIENT)
Dept: PRIMARY CARE CLINIC | Age: 75
End: 2020-12-18

## 2020-12-18 PROCEDURE — 90471 IMMUNIZATION ADMIN: CPT | Performed by: NURSE PRACTITIONER

## 2020-12-18 PROCEDURE — 90715 TDAP VACCINE 7 YRS/> IM: CPT | Performed by: NURSE PRACTITIONER

## 2020-12-18 NOTE — PROGRESS NOTES
Per the orders of MICKI Mathews ,  tdap immunization was given to Ellen Wadsworth. Ellen Wadsworth was counseled on the risk/benefits of receiving this medication. She verbalized understanding. Patient responses    Have you ever had an allergic reaction to an immunization? No  Do you feel ill or have a fever today? No  Are you currently taking an antibiotic? No  Are you pregnant or suspect pregnancy? No    Current VIS given.     Immunizations Administered     Name Date Dose Route    Tdap (Boostrix, Adacel) 12/18/2020 0.5 mL Intramuscular    Site: Deltoid- Left    Lot: [id number]    NDC: 84238-734-39

## 2021-03-10 ENCOUNTER — OFFICE VISIT (OUTPATIENT)
Dept: PRIMARY CARE CLINIC | Age: 76
End: 2021-03-10
Payer: MEDICARE

## 2021-03-10 VITALS
WEIGHT: 157 LBS | TEMPERATURE: 97.7 F | OXYGEN SATURATION: 97 % | BODY MASS INDEX: 28.89 KG/M2 | DIASTOLIC BLOOD PRESSURE: 84 MMHG | HEART RATE: 61 BPM | RESPIRATION RATE: 16 BRPM | HEIGHT: 62 IN | SYSTOLIC BLOOD PRESSURE: 164 MMHG

## 2021-03-10 DIAGNOSIS — I15.9 SECONDARY HYPERTENSION: ICD-10-CM

## 2021-03-10 DIAGNOSIS — E03.9 ACQUIRED HYPOTHYROIDISM: ICD-10-CM

## 2021-03-10 DIAGNOSIS — K52.9 GASTROENTERITIS: ICD-10-CM

## 2021-03-10 DIAGNOSIS — M81.0 OSTEOPOROSIS, UNSPECIFIED OSTEOPOROSIS TYPE, UNSPECIFIED PATHOLOGICAL FRACTURE PRESENCE: ICD-10-CM

## 2021-03-10 DIAGNOSIS — E55.9 VITAMIN D DEFICIENCY: ICD-10-CM

## 2021-03-10 DIAGNOSIS — F32.A DEPRESSION, UNSPECIFIED DEPRESSION TYPE: ICD-10-CM

## 2021-03-10 DIAGNOSIS — K62.89 RECTAL PAIN: ICD-10-CM

## 2021-03-10 DIAGNOSIS — J30.9 ALLERGIC RHINITIS, UNSPECIFIED SEASONALITY, UNSPECIFIED TRIGGER: ICD-10-CM

## 2021-03-10 DIAGNOSIS — E11.9 TYPE 2 DIABETES MELLITUS WITHOUT COMPLICATION, WITHOUT LONG-TERM CURRENT USE OF INSULIN (HCC): ICD-10-CM

## 2021-03-10 DIAGNOSIS — E78.00 PURE HYPERCHOLESTEROLEMIA: ICD-10-CM

## 2021-03-10 DIAGNOSIS — R19.4 BOWEL HABIT CHANGES: Primary | ICD-10-CM

## 2021-03-10 DIAGNOSIS — R41.3 MEMORY LOSS: ICD-10-CM

## 2021-03-10 DIAGNOSIS — E11.9 TYPE 2 DIABETES MELLITUS WITHOUT COMPLICATION, WITHOUT LONG-TERM CURRENT USE OF INSULIN (HCC): Primary | ICD-10-CM

## 2021-03-10 LAB
CREATININE URINE POCT: 23.2
MICROALBUMIN/CREAT 24H UR: 10 MG/G{CREAT}
MICROALBUMIN/CREAT UR-RTO: 43.1

## 2021-03-10 PROCEDURE — 99214 OFFICE O/P EST MOD 30 MIN: CPT | Performed by: NURSE PRACTITIONER

## 2021-03-10 PROCEDURE — 2022F DILAT RTA XM EVC RTNOPTHY: CPT | Performed by: NURSE PRACTITIONER

## 2021-03-10 PROCEDURE — 4040F PNEUMOC VAC/ADMIN/RCVD: CPT | Performed by: NURSE PRACTITIONER

## 2021-03-10 PROCEDURE — 82044 UR ALBUMIN SEMIQUANTITATIVE: CPT | Performed by: NURSE PRACTITIONER

## 2021-03-10 PROCEDURE — G8484 FLU IMMUNIZE NO ADMIN: HCPCS | Performed by: NURSE PRACTITIONER

## 2021-03-10 PROCEDURE — G8399 PT W/DXA RESULTS DOCUMENT: HCPCS | Performed by: NURSE PRACTITIONER

## 2021-03-10 PROCEDURE — G8417 CALC BMI ABV UP PARAM F/U: HCPCS | Performed by: NURSE PRACTITIONER

## 2021-03-10 PROCEDURE — 3017F COLORECTAL CA SCREEN DOC REV: CPT | Performed by: NURSE PRACTITIONER

## 2021-03-10 PROCEDURE — 3044F HG A1C LEVEL LT 7.0%: CPT | Performed by: NURSE PRACTITIONER

## 2021-03-10 PROCEDURE — G8427 DOCREV CUR MEDS BY ELIG CLIN: HCPCS | Performed by: NURSE PRACTITIONER

## 2021-03-10 PROCEDURE — 1123F ACP DISCUSS/DSCN MKR DOCD: CPT | Performed by: NURSE PRACTITIONER

## 2021-03-10 PROCEDURE — 1090F PRES/ABSN URINE INCON ASSESS: CPT | Performed by: NURSE PRACTITIONER

## 2021-03-10 PROCEDURE — 1036F TOBACCO NON-USER: CPT | Performed by: NURSE PRACTITIONER

## 2021-03-10 RX ORDER — DONEPEZIL HYDROCHLORIDE 10 MG/1
10 TABLET, FILM COATED ORAL NIGHTLY
Qty: 90 TABLET | Refills: 1 | Status: SHIPPED | OUTPATIENT
Start: 2021-03-10 | End: 2021-11-30

## 2021-03-10 RX ORDER — ERGOCALCIFEROL (VITAMIN D2) 1250 MCG
50000 CAPSULE ORAL WEEKLY
Qty: 12 CAPSULE | Refills: 3 | Status: SHIPPED | OUTPATIENT
Start: 2021-03-10 | End: 2022-06-23 | Stop reason: SDUPTHER

## 2021-03-10 RX ORDER — LANCETS
EACH MISCELLANEOUS
Qty: 100 EACH | Refills: 3 | Status: SHIPPED | OUTPATIENT
Start: 2021-03-10 | End: 2022-06-23 | Stop reason: SDUPTHER

## 2021-03-10 RX ORDER — ATORVASTATIN CALCIUM 20 MG/1
20 TABLET, FILM COATED ORAL DAILY
Qty: 90 TABLET | Refills: 3 | Status: SHIPPED | OUTPATIENT
Start: 2021-03-10 | End: 2022-06-23 | Stop reason: SDUPTHER

## 2021-03-10 RX ORDER — MONTELUKAST SODIUM 10 MG/1
TABLET ORAL
Qty: 90 TABLET | Refills: 3 | Status: SHIPPED | OUTPATIENT
Start: 2021-03-10 | End: 2022-04-11

## 2021-03-10 RX ORDER — ATENOLOL 50 MG/1
TABLET ORAL
Qty: 90 TABLET | Refills: 0 | Status: SHIPPED | OUTPATIENT
Start: 2021-03-10 | End: 2021-06-25 | Stop reason: SDUPTHER

## 2021-03-10 RX ORDER — ALENDRONATE SODIUM 70 MG/1
TABLET ORAL
Qty: 12 TABLET | Refills: 3 | Status: SHIPPED | OUTPATIENT
Start: 2021-03-10 | End: 2022-06-23 | Stop reason: SDUPTHER

## 2021-03-10 RX ORDER — ESOMEPRAZOLE MAGNESIUM 40 MG/1
CAPSULE, DELAYED RELEASE ORAL
Qty: 90 CAPSULE | Refills: 3 | Status: SHIPPED | OUTPATIENT
Start: 2021-03-10 | End: 2021-09-09 | Stop reason: DRUGHIGH

## 2021-03-10 RX ORDER — ESCITALOPRAM OXALATE 10 MG/1
TABLET ORAL
Qty: 90 TABLET | Refills: 3 | Status: SHIPPED | OUTPATIENT
Start: 2021-03-10 | End: 2022-06-23 | Stop reason: SDUPTHER

## 2021-03-10 RX ORDER — LISINOPRIL 30 MG/1
30 TABLET ORAL DAILY
Qty: 90 TABLET | Refills: 3 | Status: SHIPPED | OUTPATIENT
Start: 2021-03-10 | End: 2021-03-17 | Stop reason: DRUGHIGH

## 2021-03-10 RX ORDER — LISINOPRIL 40 MG/1
40 TABLET ORAL DAILY
Qty: 90 TABLET | Refills: 3 | Status: SHIPPED | OUTPATIENT
Start: 2021-03-10 | End: 2021-05-20 | Stop reason: ALTCHOICE

## 2021-03-10 RX ORDER — LEVOTHYROXINE SODIUM 0.03 MG/1
TABLET ORAL
Qty: 90 TABLET | Refills: 3 | Status: SHIPPED | OUTPATIENT
Start: 2021-03-10 | End: 2022-04-11

## 2021-03-10 RX ORDER — FLUTICASONE PROPIONATE 50 MCG
SPRAY, SUSPENSION (ML) NASAL
Qty: 16 G | Refills: 3 | Status: SHIPPED | OUTPATIENT
Start: 2021-03-10 | End: 2022-06-23 | Stop reason: SDUPTHER

## 2021-03-10 RX ORDER — FEXOFENADINE HCL 180 MG/1
180 TABLET ORAL DAILY
Qty: 90 TABLET | Refills: 3 | Status: SHIPPED | OUTPATIENT
Start: 2021-03-10 | End: 2022-06-23 | Stop reason: SDUPTHER

## 2021-03-10 RX ORDER — BLOOD SUGAR DIAGNOSTIC
STRIP MISCELLANEOUS
Qty: 300 STRIP | Refills: 3 | Status: SHIPPED | OUTPATIENT
Start: 2021-03-10 | End: 2022-06-23 | Stop reason: SDUPTHER

## 2021-03-10 NOTE — PROGRESS NOTES
Chief Complaint   Patient presents with   [address] Diabetes    Hyperlipidemia    Hypertension       Have you seen any other physician or provider since your last visit no    Have you had any other diagnostic tests since your last visit? no    Have you changed or stopped any medications since your last visit? no     I have recommended that this patient have a sigmoidoscopy and microalbumin but she declines at this time. I have discussed the risks and benefits of this examination with her. The patient verbalizes understanding. Diabetic retinal exam completed this year? Yes                       * If yes please have patient sign a records release to obtain record to update Health Maintenance                       * If no, please order referral for patient to be scheduled     Patient is here to follow up on diabetes,blood pressure and cholesterol. She needs labs drawn but fasting. Her sugar was 120-130. She started back on Metformin TID since her sugar was increasing.

## 2021-03-10 NOTE — PROGRESS NOTES
SUBJECTIVE:    Patient ID: Jaziel Lau is a 78 y.o. female. Chief Complaint   Patient presents with    Discuss Labs    Diabetes    Hyperlipidemia    Hypertension         HPI:  She comes in about her diabetes, htn and   Tremors have started in both hands off and on. She returns about her diabetes. She denies any low or high blood sugars. She has been doing fine with her medications. She is taking blood pressure medications. She has been taking her thyroid medications. She has been having some changes in her bowel changes and some stomach problems. She is agreeable to have a colonoscopy  She has been having some rectal pain but no bleeding. Patient's medications,allergies, past medical, surgical, social and family histories were reviewed and updated as appropriate. .  Current Outpatient Medications on File Prior to Visit   Medication Sig Dispense Refill    nystatin-triamcinolone (MYCOLOG II) 756454-7.1 UNIT/GM-% cream Apply topically 2 times daily. 60 g 3    nitroGLYCERIN (NITROSTAT) 0.4 MG SL tablet Place 1 tablet under the tongue as needed for Chest pain 25 tablet 3    aspirin 81 MG EC tablet Take 1 tablet by mouth daily 90 tablet 3    Biotin 5000 MCG TABS Take by mouth      Lactobacillus (PROBIOTIC ACIDOPHILUS PO) Take by mouth      calcium carbonate 600 MG TABS tablet Take 1 tablet by mouth daily. Current Facility-Administered Medications on File Prior to Visit   Medication Dose Route Frequency Provider Last Rate Last Admin    cyanocobalamin injection 1,000 mcg  1,000 mcg Intramuscular Once MICKI Crum        cloNIDine (CATAPRES) tablet 0.1 mg  0.1 mg Oral BID MICKI Crum        cyanocobalamin injection 1,000 mcg  1,000 mcg Intramuscular Once [employer], DO           Review of Systems   Gastrointestinal: Positive for constipation, diarrhea and rectal pain. Neurological: Positive for tremors.        OBJECTIVE:  BP (!) 164/84 (Site: Right Upper Arm, each nostril once daily. 90 day supply    Gastroenteritis  -     esomeprazole (NEXIUM) 40 MG delayed release capsule; take 1 capsule by mouth every morning BEFORE BREAKFAST    Depression, unspecified depression type  -     escitalopram (LEXAPRO) 10 MG tablet; take 1 tablet by mouth once daily    Vitamin D deficiency  -     ergocalciferol (DRISDOL) 1.25 MG (16536 UT) capsule; Take 1 capsule by mouth once a week 90 day supply    Memory loss  -     donepezil (ARICEPT) 10 MG tablet; Take 1 tablet by mouth nightly    Pure hypercholesterolemia  -     atorvastatin (LIPITOR) 20 MG tablet;  Take 1 tablet by mouth daily  -     atenolol (TENORMIN) 50 MG tablet; take 1 tablet by mouth once daily    Osteoporosis, unspecified osteoporosis type, unspecified pathological fracture presence  -     alendronate (FOSAMAX) 70 MG tablet; take 1 tablet by mouth every week    Rectal pain  -     External Referral To General Surgery    Other orders  -     montelukast (SINGULAIR) 10 MG tablet; take 1 tablet by mouth at bedtime      Medications Discontinued During This Encounter   Medication Reason    lisinopril (PRINIVIL;ZESTRIL) 30 MG tablet Therapy completed    ACCU-CHEK MULTICLIX LANCETS MISC REORDER    montelukast (SINGULAIR) 10 MG tablet REORDER    fluticasone (FLONASE) 50 MCG/ACT nasal spray REORDER    fexofenadine (ALLEGRA) 180 MG tablet REORDER    escitalopram (LEXAPRO) 10 MG tablet REORDER    ergocalciferol (DRISDOL) 1.25 MG (65933 UT) capsule REORDER    atorvastatin (LIPITOR) 20 MG tablet REORDER    metFORMIN (GLUCOPHAGE) 500 MG tablet REORDER    atenolol (TENORMIN) 50 MG tablet REORDER    levothyroxine (SYNTHROID) 25 MCG tablet REORDER    esomeprazole (NEXIUM) 40 MG delayed release capsule REORDER    donepezil (ARICEPT) 10 MG tablet REORDER    alendronate (FOSAMAX) 70 MG tablet REORDER    blood glucose test strips (ACCU-CHEK TAYLA PLUS) strip REORDER    lisinopril (PRINIVIL;ZESTRIL) 30 MG tablet DOSE ADJUSTMENT

## 2021-03-16 ENCOUNTER — HOSPITAL ENCOUNTER (OUTPATIENT)
Facility: HOSPITAL | Age: 76
Discharge: HOME | End: 2021-03-16
Payer: MEDICARE

## 2021-03-16 DIAGNOSIS — E55.9 VITAMIN D DEFICIENCY: ICD-10-CM

## 2021-03-16 DIAGNOSIS — E11.9 TYPE 2 DIABETES MELLITUS WITHOUT COMPLICATION, WITHOUT LONG-TERM CURRENT USE OF INSULIN (HCC): ICD-10-CM

## 2021-03-16 DIAGNOSIS — E03.9 ACQUIRED HYPOTHYROIDISM: ICD-10-CM

## 2021-03-16 DIAGNOSIS — E78.00 PURE HYPERCHOLESTEROLEMIA: ICD-10-CM

## 2021-03-16 LAB
A/G RATIO: 1.8 (ref 0.8–2)
ALBUMIN SERPL-MCNC: 4.1 G/DL (ref 3.4–4.8)
ALP BLD-CCNC: 131 U/L (ref 25–100)
ALT SERPL-CCNC: 14 U/L (ref 4–36)
ANION GAP SERPL CALCULATED.3IONS-SCNC: 9 MMOL/L (ref 3–16)
AST SERPL-CCNC: 12 U/L (ref 8–33)
BILIRUB SERPL-MCNC: 0.5 MG/DL (ref 0.3–1.2)
BUN BLDV-MCNC: 17 MG/DL (ref 6–20)
CALCIUM SERPL-MCNC: 9.4 MG/DL (ref 8.5–10.5)
CHLORIDE BLD-SCNC: 106 MMOL/L (ref 98–107)
CHOLESTEROL, TOTAL: 153 MG/DL (ref 0–200)
CO2: 31 MMOL/L (ref 20–30)
CREAT SERPL-MCNC: 0.9 MG/DL (ref 0.4–1.2)
FOLATE: 14.47 NG/ML
GFR AFRICAN AMERICAN: >59
GFR NON-AFRICAN AMERICAN: >60
GLOBULIN: 2.3 G/DL
GLUCOSE BLD-MCNC: 148 MG/DL (ref 74–106)
HBA1C MFR BLD: 6.9 %
HCT VFR BLD CALC: 39.4 % (ref 37–47)
HDLC SERPL-MCNC: 52 MG/DL (ref 40–60)
HEMOGLOBIN: 12.4 G/DL (ref 11.5–16.5)
LDL CHOLESTEROL CALCULATED: 68 MG/DL
MCH RBC QN AUTO: 28.8 PG (ref 27–32)
MCHC RBC AUTO-ENTMCNC: 31.5 G/DL (ref 31–35)
MCV RBC AUTO: 91.4 FL (ref 80–100)
PDW BLD-RTO: 12.5 % (ref 11–16)
PLATELET # BLD: 219 K/UL (ref 150–400)
PMV BLD AUTO: 11.8 FL (ref 6–10)
POTASSIUM SERPL-SCNC: 4.6 MMOL/L (ref 3.4–5.1)
RBC # BLD: 4.31 M/UL (ref 3.8–5.8)
SODIUM BLD-SCNC: 146 MMOL/L (ref 136–145)
TOTAL PROTEIN: 6.4 G/DL (ref 6.4–8.3)
TRIGL SERPL-MCNC: 166 MG/DL (ref 0–249)
TSH SERPL DL<=0.05 MIU/L-ACNC: 3.01 UIU/ML (ref 0.27–4.2)
VITAMIN B-12: 929 PG/ML (ref 211–911)
VITAMIN D 25-HYDROXY: 17.5 (ref 32–100)
VLDLC SERPL CALC-MCNC: 33 MG/DL
WBC # BLD: 6.4 K/UL (ref 4–11)

## 2021-03-16 PROCEDURE — 84443 ASSAY THYROID STIM HORMONE: CPT

## 2021-03-16 PROCEDURE — 82746 ASSAY OF FOLIC ACID SERUM: CPT

## 2021-03-16 PROCEDURE — 80053 COMPREHEN METABOLIC PANEL: CPT

## 2021-03-16 PROCEDURE — 85027 COMPLETE CBC AUTOMATED: CPT

## 2021-03-16 PROCEDURE — 83036 HEMOGLOBIN GLYCOSYLATED A1C: CPT

## 2021-03-16 PROCEDURE — 80061 LIPID PANEL: CPT

## 2021-03-16 PROCEDURE — 82306 VITAMIN D 25 HYDROXY: CPT

## 2021-03-16 PROCEDURE — 82607 VITAMIN B-12: CPT

## 2021-03-25 ENCOUNTER — OFFICE VISIT (OUTPATIENT)
Dept: SURGERY | Facility: CLINIC | Age: 76
End: 2021-03-25

## 2021-03-25 VITALS
HEIGHT: 62 IN | HEART RATE: 66 BPM | BODY MASS INDEX: 29.26 KG/M2 | SYSTOLIC BLOOD PRESSURE: 177 MMHG | TEMPERATURE: 98.2 F | OXYGEN SATURATION: 95 % | WEIGHT: 159 LBS | DIASTOLIC BLOOD PRESSURE: 92 MMHG

## 2021-03-25 DIAGNOSIS — R10.13 EPIGASTRIC PAIN: ICD-10-CM

## 2021-03-25 DIAGNOSIS — K21.9 GASTROESOPHAGEAL REFLUX DISEASE, UNSPECIFIED WHETHER ESOPHAGITIS PRESENT: Primary | ICD-10-CM

## 2021-03-25 DIAGNOSIS — Z86.010 HISTORY OF COLON POLYPS: ICD-10-CM

## 2021-03-25 PROCEDURE — 99203 OFFICE O/P NEW LOW 30 MIN: CPT | Performed by: SURGERY

## 2021-03-26 DIAGNOSIS — Z01.818 PRE-OP TESTING: Primary | ICD-10-CM

## 2021-03-26 PROBLEM — Z86.0100 HISTORY OF COLON POLYPS: Status: ACTIVE | Noted: 2021-03-26

## 2021-03-26 PROBLEM — Z86.010 HISTORY OF COLON POLYPS: Status: ACTIVE | Noted: 2021-03-26

## 2021-03-26 PROBLEM — R10.13 EPIGASTRIC PAIN: Status: ACTIVE | Noted: 2021-03-26

## 2021-03-26 PROBLEM — K21.9 GASTROESOPHAGEAL REFLUX DISEASE: Status: ACTIVE | Noted: 2021-03-26

## 2021-03-26 RX ORDER — POLYETHYLENE GLYCOL 3350 17 G/17G
POWDER, FOR SOLUTION ORAL
Qty: 238 G | Refills: 0 | Status: SHIPPED | OUTPATIENT
Start: 2021-03-26 | End: 2021-08-03

## 2021-03-26 RX ORDER — BISACODYL 5 MG
TABLET, DELAYED RELEASE (ENTERIC COATED) ORAL
Qty: 4 TABLET | Refills: 0 | Status: SHIPPED | OUTPATIENT
Start: 2021-03-26 | End: 2021-08-03

## 2021-03-28 ASSESSMENT — ENCOUNTER SYMPTOMS
CONSTIPATION: 1
DIARRHEA: 1
RECTAL PAIN: 1

## 2021-04-06 ENCOUNTER — HOSPITAL ENCOUNTER (OUTPATIENT)
Facility: HOSPITAL | Age: 76
Discharge: HOME | End: 2021-04-06
Payer: MEDICARE

## 2021-04-06 LAB — SARS-COV-2, NAAT: NOT DETECTED

## 2021-04-06 PROCEDURE — 87635 SARS-COV-2 COVID-19 AMP PRB: CPT

## 2021-04-08 RX ORDER — CARBOXYMETHYLCELLULOSE SODIUM 5 MG/ML
1 SOLUTION/ DROPS OPHTHALMIC DAILY PRN
COMMUNITY

## 2021-04-09 ENCOUNTER — ANESTHESIA EVENT (OUTPATIENT)
Dept: GASTROENTEROLOGY | Facility: HOSPITAL | Age: 76
End: 2021-04-09

## 2021-04-09 ENCOUNTER — ANESTHESIA (OUTPATIENT)
Dept: GASTROENTEROLOGY | Facility: HOSPITAL | Age: 76
End: 2021-04-09

## 2021-04-09 ENCOUNTER — HOSPITAL ENCOUNTER (OUTPATIENT)
Facility: HOSPITAL | Age: 76
Setting detail: HOSPITAL OUTPATIENT SURGERY
Discharge: HOME | End: 2021-04-09
Attending: SURGERY | Admitting: SURGERY

## 2021-04-09 VITALS
RESPIRATION RATE: 20 BRPM | OXYGEN SATURATION: 95 % | HEART RATE: 65 BPM | DIASTOLIC BLOOD PRESSURE: 70 MMHG | TEMPERATURE: 97.8 F | SYSTOLIC BLOOD PRESSURE: 140 MMHG

## 2021-04-09 DIAGNOSIS — R10.13 EPIGASTRIC PAIN: ICD-10-CM

## 2021-04-09 DIAGNOSIS — Z86.010 HISTORY OF COLON POLYPS: ICD-10-CM

## 2021-04-09 DIAGNOSIS — K21.9 GASTROESOPHAGEAL REFLUX DISEASE, UNSPECIFIED WHETHER ESOPHAGITIS PRESENT: ICD-10-CM

## 2021-04-09 PROCEDURE — 45384 COLONOSCOPY W/LESION REMOVAL: CPT | Performed by: SURGERY

## 2021-04-09 PROCEDURE — 88305 TISSUE EXAM BY PATHOLOGIST: CPT | Performed by: SURGERY

## 2021-04-09 PROCEDURE — 25010000002 PROPOFOL 10 MG/ML EMULSION: Performed by: NURSE ANESTHETIST, CERTIFIED REGISTERED

## 2021-04-09 PROCEDURE — 25010000002 ONDANSETRON PER 1 MG: Performed by: NURSE ANESTHETIST, CERTIFIED REGISTERED

## 2021-04-09 RX ORDER — ONDANSETRON 2 MG/ML
4 INJECTION INTRAMUSCULAR; INTRAVENOUS ONCE AS NEEDED
Status: CANCELLED | OUTPATIENT
Start: 2021-04-09 | End: 2021-04-09

## 2021-04-09 RX ORDER — LIDOCAINE HYDROCHLORIDE 20 MG/ML
INJECTION, SOLUTION INTRAVENOUS AS NEEDED
Status: DISCONTINUED | OUTPATIENT
Start: 2021-04-09 | End: 2021-04-09 | Stop reason: SURG

## 2021-04-09 RX ORDER — KETAMINE HYDROCHLORIDE 50 MG/ML
INJECTION, SOLUTION, CONCENTRATE INTRAMUSCULAR; INTRAVENOUS AS NEEDED
Status: DISCONTINUED | OUTPATIENT
Start: 2021-04-09 | End: 2021-04-09 | Stop reason: SURG

## 2021-04-09 RX ORDER — SODIUM CHLORIDE 0.9 % (FLUSH) 0.9 %
10 SYRINGE (ML) INJECTION AS NEEDED
Status: DISCONTINUED | OUTPATIENT
Start: 2021-04-09 | End: 2021-04-09 | Stop reason: HOSPADM

## 2021-04-09 RX ORDER — PROPOFOL 10 MG/ML
VIAL (ML) INTRAVENOUS AS NEEDED
Status: DISCONTINUED | OUTPATIENT
Start: 2021-04-09 | End: 2021-04-09 | Stop reason: SURG

## 2021-04-09 RX ORDER — SODIUM CHLORIDE, SODIUM LACTATE, POTASSIUM CHLORIDE, CALCIUM CHLORIDE 600; 310; 30; 20 MG/100ML; MG/100ML; MG/100ML; MG/100ML
1000 INJECTION, SOLUTION INTRAVENOUS CONTINUOUS
Status: DISCONTINUED | OUTPATIENT
Start: 2021-04-09 | End: 2021-04-09 | Stop reason: HOSPADM

## 2021-04-09 RX ORDER — ONDANSETRON 2 MG/ML
INJECTION INTRAMUSCULAR; INTRAVENOUS AS NEEDED
Status: DISCONTINUED | OUTPATIENT
Start: 2021-04-09 | End: 2021-04-09 | Stop reason: SURG

## 2021-04-09 RX ORDER — ONDANSETRON 2 MG/ML
4 INJECTION INTRAMUSCULAR; INTRAVENOUS ONCE AS NEEDED
Status: DISCONTINUED | OUTPATIENT
Start: 2021-04-09 | End: 2021-04-09 | Stop reason: HOSPADM

## 2021-04-09 RX ADMIN — ONDANSETRON 4 MG: 2 INJECTION INTRAMUSCULAR; INTRAVENOUS at 11:10

## 2021-04-09 RX ADMIN — LIDOCAINE HYDROCHLORIDE 100 MG: 20 INJECTION, SOLUTION INTRAVENOUS at 11:05

## 2021-04-09 RX ADMIN — SODIUM CHLORIDE, POTASSIUM CHLORIDE, SODIUM LACTATE AND CALCIUM CHLORIDE 1000 ML: 600; 310; 30; 20 INJECTION, SOLUTION INTRAVENOUS at 10:45

## 2021-04-09 RX ADMIN — KETAMINE HYDROCHLORIDE 25 MG: 50 INJECTION, SOLUTION INTRAMUSCULAR; INTRAVENOUS at 11:07

## 2021-04-09 RX ADMIN — PROPOFOL 150 MG: 10 INJECTION, EMULSION INTRAVENOUS at 11:21

## 2021-04-13 LAB
LAB AP CASE REPORT: NORMAL
PATH REPORT.FINAL DX SPEC: NORMAL

## 2021-05-20 ENCOUNTER — OFFICE VISIT (OUTPATIENT)
Dept: PRIMARY CARE CLINIC | Age: 76
End: 2021-05-20
Payer: MEDICARE

## 2021-05-20 VITALS
TEMPERATURE: 97.1 F | RESPIRATION RATE: 16 BRPM | OXYGEN SATURATION: 98 % | SYSTOLIC BLOOD PRESSURE: 156 MMHG | HEIGHT: 62 IN | HEART RATE: 65 BPM | BODY MASS INDEX: 29.37 KG/M2 | WEIGHT: 159.6 LBS | DIASTOLIC BLOOD PRESSURE: 74 MMHG

## 2021-05-20 DIAGNOSIS — R10.30 LOWER ABDOMINAL PAIN: ICD-10-CM

## 2021-05-20 DIAGNOSIS — I10 ESSENTIAL HYPERTENSION: Primary | ICD-10-CM

## 2021-05-20 DIAGNOSIS — G47.33 OBSTRUCTIVE SLEEP APNEA: ICD-10-CM

## 2021-05-20 PROCEDURE — 99214 OFFICE O/P EST MOD 30 MIN: CPT | Performed by: NURSE PRACTITIONER

## 2021-05-20 PROCEDURE — G8417 CALC BMI ABV UP PARAM F/U: HCPCS | Performed by: NURSE PRACTITIONER

## 2021-05-20 PROCEDURE — G8427 DOCREV CUR MEDS BY ELIG CLIN: HCPCS | Performed by: NURSE PRACTITIONER

## 2021-05-20 PROCEDURE — 4040F PNEUMOC VAC/ADMIN/RCVD: CPT | Performed by: NURSE PRACTITIONER

## 2021-05-20 PROCEDURE — 1036F TOBACCO NON-USER: CPT | Performed by: NURSE PRACTITIONER

## 2021-05-20 PROCEDURE — G8399 PT W/DXA RESULTS DOCUMENT: HCPCS | Performed by: NURSE PRACTITIONER

## 2021-05-20 PROCEDURE — 3017F COLORECTAL CA SCREEN DOC REV: CPT | Performed by: NURSE PRACTITIONER

## 2021-05-20 PROCEDURE — 1090F PRES/ABSN URINE INCON ASSESS: CPT | Performed by: NURSE PRACTITIONER

## 2021-05-20 PROCEDURE — 1123F ACP DISCUSS/DSCN MKR DOCD: CPT | Performed by: NURSE PRACTITIONER

## 2021-05-20 RX ORDER — IRBESARTAN 300 MG/1
300 TABLET ORAL DAILY
Qty: 30 TABLET | Refills: 3 | Status: SHIPPED | OUTPATIENT
Start: 2021-05-20 | End: 2021-09-09 | Stop reason: ALTCHOICE

## 2021-05-20 ASSESSMENT — ENCOUNTER SYMPTOMS: RESPIRATORY NEGATIVE: 1

## 2021-05-20 NOTE — PROGRESS NOTES
Chief Complaint   Patient presents with    Follow-up    Hypertension    Diabetes       Have you seen any other physician or provider since your last visit no    Have you had any other diagnostic tests since your last visit? no    Have you changed or stopped any medications since your last visit? Yes increased dosage of Metformin    I have recommended that this patient have a sigmoidoscopy but she declines at this time. I have discussed the risks and benefits of this examination with her. The patient verbalizes understanding. Patient is here to follow up on medication management for diabetes and blood pressure. She states that her sugar was 100 this morning fasting. It has gotten up to 130. She is taking Metformin 500 mg tid.

## 2021-05-27 ENCOUNTER — TELEPHONE (OUTPATIENT)
Dept: PRIMARY CARE CLINIC | Age: 76
End: 2021-05-27

## 2021-06-07 ENCOUNTER — HOSPITAL ENCOUNTER (OUTPATIENT)
Dept: CT IMAGING | Facility: HOSPITAL | Age: 76
Discharge: HOME | End: 2021-06-07
Payer: MEDICARE

## 2021-06-07 DIAGNOSIS — R10.30 LOWER ABDOMINAL PAIN: ICD-10-CM

## 2021-06-07 PROCEDURE — 74176 CT ABD & PELVIS W/O CONTRAST: CPT

## 2021-06-16 ENCOUNTER — OFFICE VISIT (OUTPATIENT)
Dept: PRIMARY CARE CLINIC | Age: 76
End: 2021-06-16
Payer: MEDICARE

## 2021-06-16 VITALS
WEIGHT: 160.2 LBS | SYSTOLIC BLOOD PRESSURE: 140 MMHG | BODY MASS INDEX: 29.48 KG/M2 | HEART RATE: 61 BPM | RESPIRATION RATE: 16 BRPM | HEIGHT: 62 IN | DIASTOLIC BLOOD PRESSURE: 72 MMHG | OXYGEN SATURATION: 96 % | TEMPERATURE: 97.5 F

## 2021-06-16 DIAGNOSIS — E11.9 TYPE 2 DIABETES MELLITUS WITHOUT COMPLICATION, WITHOUT LONG-TERM CURRENT USE OF INSULIN (HCC): ICD-10-CM

## 2021-06-16 DIAGNOSIS — I15.9 SECONDARY HYPERTENSION: Primary | ICD-10-CM

## 2021-06-16 DIAGNOSIS — E55.9 VITAMIN D DEFICIENCY: ICD-10-CM

## 2021-06-16 DIAGNOSIS — E03.9 ACQUIRED HYPOTHYROIDISM: ICD-10-CM

## 2021-06-16 DIAGNOSIS — R10.84 GENERALIZED ABDOMINAL PAIN: ICD-10-CM

## 2021-06-16 PROCEDURE — 1123F ACP DISCUSS/DSCN MKR DOCD: CPT | Performed by: NURSE PRACTITIONER

## 2021-06-16 PROCEDURE — 1090F PRES/ABSN URINE INCON ASSESS: CPT | Performed by: NURSE PRACTITIONER

## 2021-06-16 PROCEDURE — 3017F COLORECTAL CA SCREEN DOC REV: CPT | Performed by: NURSE PRACTITIONER

## 2021-06-16 PROCEDURE — 4040F PNEUMOC VAC/ADMIN/RCVD: CPT | Performed by: NURSE PRACTITIONER

## 2021-06-16 PROCEDURE — G8427 DOCREV CUR MEDS BY ELIG CLIN: HCPCS | Performed by: NURSE PRACTITIONER

## 2021-06-16 PROCEDURE — 99214 OFFICE O/P EST MOD 30 MIN: CPT | Performed by: NURSE PRACTITIONER

## 2021-06-16 PROCEDURE — G8399 PT W/DXA RESULTS DOCUMENT: HCPCS | Performed by: NURSE PRACTITIONER

## 2021-06-16 PROCEDURE — 3044F HG A1C LEVEL LT 7.0%: CPT | Performed by: NURSE PRACTITIONER

## 2021-06-16 PROCEDURE — 2022F DILAT RTA XM EVC RTNOPTHY: CPT | Performed by: NURSE PRACTITIONER

## 2021-06-16 PROCEDURE — G8417 CALC BMI ABV UP PARAM F/U: HCPCS | Performed by: NURSE PRACTITIONER

## 2021-06-16 PROCEDURE — 1036F TOBACCO NON-USER: CPT | Performed by: NURSE PRACTITIONER

## 2021-06-16 RX ORDER — IRBESARTAN AND HYDROCHLOROTHIAZIDE 300; 12.5 MG/1; MG/1
1 TABLET, FILM COATED ORAL DAILY
Qty: 90 TABLET | Refills: 3 | Status: SHIPPED | OUTPATIENT
Start: 2021-06-16 | End: 2021-09-09 | Stop reason: ALTCHOICE

## 2021-06-16 NOTE — PROGRESS NOTES
SUBJECTIVE:    Patient ID: Evelyne Galindo is a 78 y.o. female. Chief Complaint   Patient presents with    Follow-up    Hypertension    Diabetes         HPI:  She had a a colonoscopy. Her colonoscopy did not reveal anything but she continues to have some lower quadrant pain. She is concerned about why she continues to have that. She had a CT scan as well that did not show anything significant. She is taking metamucil for the past she says the Metamucil does help with her bowel movements. She is taking her blood pressure medication and thyroid medication without any complaints or problems. She has 9 syncopal episodes or falls. Patient's medications,allergies, past medical, surgical, social and family histories were reviewed and updated as appropriate. .  Current Outpatient Medications on File Prior to Visit   Medication Sig Dispense Refill    irbesartan (AVAPRO) 300 MG tablet Take 1 tablet by mouth daily 30 tablet 3    montelukast (SINGULAIR) 10 MG tablet take 1 tablet by mouth at bedtime 90 tablet 3    metFORMIN (GLUCOPHAGE) 500 MG tablet Take 1 tablet by mouth 2 times daily (with meals) (Patient taking differently: Take 500 mg by mouth 3 times daily (with meals) ) 180 tablet 3    levothyroxine (SYNTHROID) 25 MCG tablet take 1 tablet by mouth once daily 90 tablet 3    fexofenadine (ALLEGRA) 180 MG tablet Take 1 tablet by mouth daily 90 tablet 3    fluticasone (FLONASE) 50 MCG/ACT nasal spray instill 2 sprays into each nostril once daily.  90 day supply 16 g 3    esomeprazole (NEXIUM) 40 MG delayed release capsule take 1 capsule by mouth every morning BEFORE BREAKFAST 90 capsule 3    escitalopram (LEXAPRO) 10 MG tablet take 1 tablet by mouth once daily 90 tablet 3    ergocalciferol (DRISDOL) 1.25 MG (26786 UT) capsule Take 1 capsule by mouth once a week 90 day supply 12 capsule 3    donepezil (ARICEPT) 10 MG tablet Take 1 tablet by mouth nightly 90 tablet 1    atorvastatin (LIPITOR) 20 MG Pupils: Pupils are equal, round, and reactive to light. Neck:      Thyroid: No thyromegaly. Vascular: No JVD. Cardiovascular:      Rate and Rhythm: Normal rate and regular rhythm. Heart sounds: No murmur heard. No friction rub. No gallop. Pulmonary:      Effort: Pulmonary effort is normal. No respiratory distress. Breath sounds: Normal breath sounds. No wheezing or rales. Abdominal:      General: Bowel sounds are normal. There is no distension. Palpations: Abdomen is soft. Tenderness: There is abdominal tenderness in the left lower quadrant. There is no guarding. Musculoskeletal:         General: No tenderness. Cervical back: Normal range of motion and neck supple. Skin:     General: Skin is warm and dry. Findings: No rash. Neurological:      Mental Status: She is alert and oriented to person, place, and time. Psychiatric:         Judgment: Judgment normal.         No results found for requested labs within last 30 days. Hemoglobin A1C (%)   Date Value   03/16/2021 6.9 (H)     Microalbumin, Random Urine (mg/dL)   Date Value   04/01/2019 <1.20     LDL Calculated (mg/dL)   Date Value   03/16/2021 68           Lab Results   Component Value Date    TSH 3.01 03/16/2021         ASSESSMENT/PLAN:     Louann Moore was seen today for follow-up, hypertension and diabetes. Diagnoses and all orders for this visit:    Secondary hypertension  -     irbesartan-hydroCHLOROthiazide (AVALIDE) 300-12.5 MG per tablet; Take 1 tablet by mouth daily    Generalized abdominal pain  -     External Referral To Gastroenterology    Acquired hypothyroidism  -     TSH without Reflex; Future    Vitamin D deficiency  -     Vitamin D 25 Hydroxy; Future    Type 2 diabetes mellitus without complication, without long-term current use of insulin (HCC)  -     CBC; Future  -     Comprehensive Metabolic Panel; Future  -     Hemoglobin A1C; Future  Stable.  I advised her regarding diabetic diet, exercise and weight control. Also, I advised her to stay on the current medication, monitor her fingerstick closely. I am going to check the A1c every few months. I will check microalbumin on a yearly basis. I have also advised her to have a yearly eye exam and to monitor her feet closely. Along with instruction of possible complications related to diabetes, even with good control. A1C will be checked  in few months. Lab Results   Component Value Date    LABA1C 6.9 (H) 03/16/2021         There are no discontinued medications.

## 2021-06-16 NOTE — PROGRESS NOTES
Chief Complaint   Patient presents with    Follow-up    Hypertension    Diabetes       Have you seen any other physician or provider since your last visit yes - Dr Gloria Duron     Have you had any other diagnostic tests since your last visit? yes - colonoscopy     Have you changed or stopped any medications since your last visit? no     Patient is here to follow up on blood pressure. She recently had a colonoscopy and would like a copy of the results. Her sugar was 120 this morning fasting.

## 2021-06-25 DIAGNOSIS — E78.00 PURE HYPERCHOLESTEROLEMIA: ICD-10-CM

## 2021-06-25 RX ORDER — ATENOLOL 50 MG/1
TABLET ORAL
Qty: 90 TABLET | Refills: 1 | Status: SHIPPED | OUTPATIENT
Start: 2021-06-25 | End: 2021-09-09 | Stop reason: SDUPTHER

## 2021-06-27 ASSESSMENT — ENCOUNTER SYMPTOMS: ABDOMINAL PAIN: 1

## 2021-06-30 ASSESSMENT — ENCOUNTER SYMPTOMS
ABDOMINAL PAIN: 1
RESPIRATORY NEGATIVE: 1

## 2021-08-03 ENCOUNTER — LAB (OUTPATIENT)
Dept: LAB | Facility: HOSPITAL | Age: 76
End: 2021-08-03

## 2021-08-03 ENCOUNTER — OFFICE VISIT (OUTPATIENT)
Dept: GASTROENTEROLOGY | Facility: CLINIC | Age: 76
End: 2021-08-03

## 2021-08-03 VITALS
HEART RATE: 78 BPM | SYSTOLIC BLOOD PRESSURE: 179 MMHG | HEIGHT: 62 IN | TEMPERATURE: 97.8 F | RESPIRATION RATE: 16 BRPM | BODY MASS INDEX: 29.44 KG/M2 | WEIGHT: 160 LBS | DIASTOLIC BLOOD PRESSURE: 65 MMHG

## 2021-08-03 DIAGNOSIS — R19.7 DIARRHEA, UNSPECIFIED TYPE: Chronic | ICD-10-CM

## 2021-08-03 DIAGNOSIS — R11.0 NAUSEA: Chronic | ICD-10-CM

## 2021-08-03 DIAGNOSIS — K21.9 GASTROESOPHAGEAL REFLUX DISEASE, UNSPECIFIED WHETHER ESOPHAGITIS PRESENT: Chronic | ICD-10-CM

## 2021-08-03 DIAGNOSIS — R74.8 ELEVATED ALKALINE PHOSPHATASE LEVEL: ICD-10-CM

## 2021-08-03 DIAGNOSIS — R19.7 DIARRHEA, UNSPECIFIED TYPE: ICD-10-CM

## 2021-08-03 DIAGNOSIS — R10.84 GENERALIZED ABDOMINAL PAIN: Primary | Chronic | ICD-10-CM

## 2021-08-03 DIAGNOSIS — Z86.010 PERSONAL HISTORY OF COLONIC POLYPS: ICD-10-CM

## 2021-08-03 DIAGNOSIS — R10.84 GENERALIZED ABDOMINAL PAIN: Chronic | ICD-10-CM

## 2021-08-03 DIAGNOSIS — K62.89 ANAL OR RECTAL PAIN: Chronic | ICD-10-CM

## 2021-08-03 PROBLEM — L98.9 LESION OF SKIN OF SCALP: Status: ACTIVE | Noted: 2017-04-20

## 2021-08-03 PROBLEM — R10.9 ABDOMINAL PAIN: Status: ACTIVE | Noted: 2021-08-03

## 2021-08-03 LAB
ALBUMIN SERPL-MCNC: 4.2 G/DL (ref 3.5–5.2)
ALBUMIN/GLOB SERPL: 1.6 G/DL
ALP SERPL-CCNC: 123 U/L (ref 39–117)
ALT SERPL W P-5'-P-CCNC: 19 U/L (ref 1–33)
ANION GAP SERPL CALCULATED.3IONS-SCNC: 12.7 MMOL/L (ref 5–15)
AST SERPL-CCNC: 16 U/L (ref 1–32)
BILIRUB SERPL-MCNC: 0.5 MG/DL (ref 0–1.2)
BUN SERPL-MCNC: 20 MG/DL (ref 8–23)
BUN/CREAT SERPL: 21.1 (ref 7–25)
CALCIUM SPEC-SCNC: 9.3 MG/DL (ref 8.6–10.5)
CHLORIDE SERPL-SCNC: 89 MMOL/L (ref 98–107)
CO2 SERPL-SCNC: 24.3 MMOL/L (ref 22–29)
CREAT SERPL-MCNC: 0.95 MG/DL (ref 0.57–1)
DEPRECATED RDW RBC AUTO: 40.1 FL (ref 37–54)
ERYTHROCYTE [DISTWIDTH] IN BLOOD BY AUTOMATED COUNT: 13.1 % (ref 12.3–15.4)
GFR SERPL CREATININE-BSD FRML MDRD: 58 ML/MIN/1.73
GLOBULIN UR ELPH-MCNC: 2.7 GM/DL
GLUCOSE SERPL-MCNC: 123 MG/DL (ref 65–99)
HCT VFR BLD AUTO: 34.4 % (ref 34–46.6)
HGB BLD-MCNC: 12 G/DL (ref 12–15.9)
IGA1 MFR SER: 122 MG/DL (ref 70–400)
LIPASE SERPL-CCNC: 46 U/L (ref 13–60)
MCH RBC QN AUTO: 29.4 PG (ref 26.6–33)
MCHC RBC AUTO-ENTMCNC: 34.9 G/DL (ref 31.5–35.7)
MCV RBC AUTO: 84.3 FL (ref 79–97)
PLATELET # BLD AUTO: 278 10*3/MM3 (ref 140–450)
PMV BLD AUTO: 11.3 FL (ref 6–12)
POTASSIUM SERPL-SCNC: 4.7 MMOL/L (ref 3.5–5.2)
PROT SERPL-MCNC: 6.9 G/DL (ref 6–8.5)
RBC # BLD AUTO: 4.08 10*6/MM3 (ref 3.77–5.28)
SODIUM SERPL-SCNC: 126 MMOL/L (ref 136–145)
WBC # BLD AUTO: 8.46 10*3/MM3 (ref 3.4–10.8)

## 2021-08-03 PROCEDURE — 83516 IMMUNOASSAY NONANTIBODY: CPT

## 2021-08-03 PROCEDURE — 80053 COMPREHEN METABOLIC PANEL: CPT

## 2021-08-03 PROCEDURE — 99204 OFFICE O/P NEW MOD 45 MIN: CPT | Performed by: NURSE PRACTITIONER

## 2021-08-03 PROCEDURE — 82784 ASSAY IGA/IGD/IGG/IGM EACH: CPT

## 2021-08-03 PROCEDURE — 83690 ASSAY OF LIPASE: CPT

## 2021-08-03 PROCEDURE — 85027 COMPLETE CBC AUTOMATED: CPT

## 2021-08-03 PROCEDURE — 36415 COLL VENOUS BLD VENIPUNCTURE: CPT

## 2021-08-03 RX ORDER — DICYCLOMINE HCL 20 MG
20 TABLET ORAL 3 TIMES DAILY PRN
Qty: 30 TABLET | Refills: 1 | Status: SHIPPED | OUTPATIENT
Start: 2021-08-03 | End: 2023-01-12

## 2021-08-03 RX ORDER — IRBESARTAN AND HYDROCHLOROTHIAZIDE 300; 12.5 MG/1; MG/1
1 TABLET, FILM COATED ORAL DAILY
COMMUNITY
End: 2021-08-11 | Stop reason: HOSPADM

## 2021-08-03 RX ORDER — ONDANSETRON 4 MG/1
4 TABLET, FILM COATED ORAL EVERY 8 HOURS PRN
Qty: 30 TABLET | Refills: 1 | Status: SHIPPED | OUTPATIENT
Start: 2021-08-03 | End: 2021-09-07 | Stop reason: SDUPTHER

## 2021-08-04 LAB
ADV 40+41 DNA STL QL NAA+NON-PROBE: NOT DETECTED
ASTRO TYP 1-8 RNA STL QL NAA+NON-PROBE: NOT DETECTED
C CAYETANENSIS DNA STL QL NAA+NON-PROBE: NOT DETECTED
C COLI+JEJ+UPSA DNA STL QL NAA+NON-PROBE: NOT DETECTED
C DIFF TOX GENS STL QL NAA+PROBE: NOT DETECTED
CRYPTOSP DNA STL QL NAA+NON-PROBE: NOT DETECTED
E HISTOLYT DNA STL QL NAA+NON-PROBE: NOT DETECTED
EAEC PAA PLAS AGGR+AATA ST NAA+NON-PRB: NOT DETECTED
EC STX1+STX2 GENES STL QL NAA+NON-PROBE: NOT DETECTED
EPEC EAE GENE STL QL NAA+NON-PROBE: NOT DETECTED
ETEC LTA+ST1A+ST1B TOX ST NAA+NON-PROBE: NOT DETECTED
G LAMBLIA DNA STL QL NAA+NON-PROBE: NOT DETECTED
NOROVIRUS GI+II RNA STL QL NAA+NON-PROBE: NOT DETECTED
P SHIGELLOIDES DNA STL QL NAA+NON-PROBE: NOT DETECTED
RVA RNA STL QL NAA+NON-PROBE: NOT DETECTED
S ENT+BONG DNA STL QL NAA+NON-PROBE: NOT DETECTED
SAPO I+II+IV+V RNA STL QL NAA+NON-PROBE: NOT DETECTED
SHIGELLA SP+EIEC IPAH ST NAA+NON-PROBE: NOT DETECTED
TTG IGA SER-ACNC: 2 U/ML (ref 0–3)
V CHOL+PARA+VUL DNA STL QL NAA+NON-PROBE: NOT DETECTED
V CHOLERAE DNA STL QL NAA+NON-PROBE: NOT DETECTED
Y ENTEROCOL DNA STL QL NAA+NON-PROBE: NOT DETECTED

## 2021-08-04 PROCEDURE — 83993 ASSAY FOR CALPROTECTIN FECAL: CPT

## 2021-08-04 PROCEDURE — 82656 EL-1 FECAL QUAL/SEMIQ: CPT

## 2021-08-04 PROCEDURE — 87493 C DIFF AMPLIFIED PROBE: CPT

## 2021-08-04 PROCEDURE — 0097U HC BIOFIRE FILMARRAY GI PANEL: CPT

## 2021-08-06 LAB — ELASTASE PANC STL-MCNT: >500 UG ELAST./G

## 2021-08-09 DIAGNOSIS — R10.84 GENERALIZED ABDOMINAL PAIN: Primary | ICD-10-CM

## 2021-08-09 RX ORDER — ESOMEPRAZOLE MAGNESIUM 40 MG/1
CAPSULE, DELAYED RELEASE ORAL
Qty: 30 CAPSULE | Refills: 3 | Status: SHIPPED | OUTPATIENT
Start: 2021-08-09

## 2021-08-09 RX ORDER — SUCRALFATE 1 G/1
1 TABLET ORAL 4 TIMES DAILY
Qty: 56 TABLET | Refills: 0 | Status: SHIPPED | OUTPATIENT
Start: 2021-08-09 | End: 2021-08-23

## 2021-08-10 ENCOUNTER — APPOINTMENT (OUTPATIENT)
Dept: CT IMAGING | Facility: HOSPITAL | Age: 76
End: 2021-08-10

## 2021-08-10 ENCOUNTER — HOSPITAL ENCOUNTER (INPATIENT)
Facility: HOSPITAL | Age: 76
LOS: 1 days | Discharge: HOME | End: 2021-08-11
Attending: EMERGENCY MEDICINE | Admitting: INTERNAL MEDICINE

## 2021-08-10 ENCOUNTER — APPOINTMENT (OUTPATIENT)
Dept: GENERAL RADIOLOGY | Facility: HOSPITAL | Age: 76
End: 2021-08-10

## 2021-08-10 ENCOUNTER — TELEPHONE (OUTPATIENT)
Dept: GASTROENTEROLOGY | Facility: CLINIC | Age: 76
End: 2021-08-10

## 2021-08-10 DIAGNOSIS — N28.9 RENAL INSUFFICIENCY: Primary | ICD-10-CM

## 2021-08-10 DIAGNOSIS — E87.1 HYPONATREMIA: ICD-10-CM

## 2021-08-10 PROBLEM — N17.9 ACUTE KIDNEY INJURY: Status: ACTIVE | Noted: 2021-08-10

## 2021-08-10 LAB
ALBUMIN SERPL-MCNC: 4.5 G/DL (ref 3.5–5.2)
ALBUMIN/GLOB SERPL: 1.7 G/DL
ALP SERPL-CCNC: 121 U/L (ref 39–117)
ALT SERPL W P-5'-P-CCNC: 17 U/L (ref 1–33)
ANION GAP SERPL CALCULATED.3IONS-SCNC: 10.1 MMOL/L (ref 5–15)
ANION GAP SERPL CALCULATED.3IONS-SCNC: 11.7 MMOL/L (ref 5–15)
ANION GAP SERPL CALCULATED.3IONS-SCNC: 7.3 MMOL/L (ref 5–15)
AST SERPL-CCNC: 16 U/L (ref 1–32)
BACTERIA UR QL AUTO: ABNORMAL /HPF
BASOPHILS # BLD AUTO: 0.03 10*3/MM3 (ref 0–0.2)
BASOPHILS NFR BLD AUTO: 0.3 % (ref 0–1.5)
BILIRUB SERPL-MCNC: 0.7 MG/DL (ref 0–1.2)
BILIRUB UR QL STRIP: NEGATIVE
BUN SERPL-MCNC: 15 MG/DL (ref 8–23)
BUN SERPL-MCNC: 17 MG/DL (ref 8–23)
BUN SERPL-MCNC: 21 MG/DL (ref 8–23)
BUN/CREAT SERPL: 12.9 (ref 7–25)
BUN/CREAT SERPL: 13 (ref 7–25)
BUN/CREAT SERPL: 16.4 (ref 7–25)
CALCIUM SPEC-SCNC: 8.8 MG/DL (ref 8.6–10.5)
CALCIUM SPEC-SCNC: 9 MG/DL (ref 8.6–10.5)
CALCIUM SPEC-SCNC: 9.5 MG/DL (ref 8.6–10.5)
CALPROTECTIN STL-MCNT: 142 UG/G (ref 0–120)
CHLORIDE SERPL-SCNC: 82 MMOL/L (ref 98–107)
CHLORIDE SERPL-SCNC: 87 MMOL/L (ref 98–107)
CHLORIDE SERPL-SCNC: 90 MMOL/L (ref 98–107)
CLARITY UR: CLEAR
CO2 SERPL-SCNC: 24.9 MMOL/L (ref 22–29)
CO2 SERPL-SCNC: 25.3 MMOL/L (ref 22–29)
CO2 SERPL-SCNC: 25.7 MMOL/L (ref 22–29)
COLOR UR: YELLOW
CREAT SERPL-MCNC: 1.15 MG/DL (ref 0.57–1)
CREAT SERPL-MCNC: 1.28 MG/DL (ref 0.57–1)
CREAT SERPL-MCNC: 1.32 MG/DL (ref 0.57–1)
DEPRECATED RDW RBC AUTO: 35.8 FL (ref 37–54)
EOSINOPHIL # BLD AUTO: 0.07 10*3/MM3 (ref 0–0.4)
EOSINOPHIL NFR BLD AUTO: 0.6 % (ref 0.3–6.2)
ERYTHROCYTE [DISTWIDTH] IN BLOOD BY AUTOMATED COUNT: 11.9 % (ref 12.3–15.4)
GFR SERPL CREATININE-BSD FRML MDRD: 39 ML/MIN/1.73
GFR SERPL CREATININE-BSD FRML MDRD: 41 ML/MIN/1.73
GFR SERPL CREATININE-BSD FRML MDRD: 46 ML/MIN/1.73
GLOBULIN UR ELPH-MCNC: 2.7 GM/DL
GLUCOSE BLDC GLUCOMTR-MCNC: 127 MG/DL (ref 70–130)
GLUCOSE BLDC GLUCOMTR-MCNC: 130 MG/DL (ref 70–130)
GLUCOSE SERPL-MCNC: 106 MG/DL (ref 65–99)
GLUCOSE SERPL-MCNC: 127 MG/DL (ref 65–99)
GLUCOSE SERPL-MCNC: 132 MG/DL (ref 65–99)
GLUCOSE UR STRIP-MCNC: NEGATIVE MG/DL
HBA1C MFR BLD: 6.6 % (ref 4.8–5.6)
HCT VFR BLD AUTO: 35.8 % (ref 34–46.6)
HGB BLD-MCNC: 12.7 G/DL (ref 12–15.9)
HGB UR QL STRIP.AUTO: NEGATIVE
HYALINE CASTS UR QL AUTO: ABNORMAL /LPF
IMM GRANULOCYTES # BLD AUTO: 0.12 10*3/MM3 (ref 0–0.05)
IMM GRANULOCYTES NFR BLD AUTO: 1 % (ref 0–0.5)
KETONES UR QL STRIP: NEGATIVE
LEUKOCYTE ESTERASE UR QL STRIP.AUTO: NEGATIVE
LIPASE SERPL-CCNC: 49 U/L (ref 13–60)
LYMPHOCYTES # BLD AUTO: 1.86 10*3/MM3 (ref 0.7–3.1)
LYMPHOCYTES NFR BLD AUTO: 16.1 % (ref 19.6–45.3)
MCH RBC QN AUTO: 29.4 PG (ref 26.6–33)
MCHC RBC AUTO-ENTMCNC: 35.5 G/DL (ref 31.5–35.7)
MCV RBC AUTO: 82.9 FL (ref 79–97)
MONOCYTES # BLD AUTO: 0.87 10*3/MM3 (ref 0.1–0.9)
MONOCYTES NFR BLD AUTO: 7.5 % (ref 5–12)
NEUTROPHILS NFR BLD AUTO: 74.5 % (ref 42.7–76)
NEUTROPHILS NFR BLD AUTO: 8.59 10*3/MM3 (ref 1.7–7)
NITRITE UR QL STRIP: POSITIVE
NRBC BLD AUTO-RTO: 0 /100 WBC (ref 0–0.2)
PH UR STRIP.AUTO: 6 [PH] (ref 5–8)
PLATELET # BLD AUTO: 273 10*3/MM3 (ref 140–450)
PMV BLD AUTO: 10.1 FL (ref 6–12)
POTASSIUM SERPL-SCNC: 4.4 MMOL/L (ref 3.5–5.2)
POTASSIUM SERPL-SCNC: 4.5 MMOL/L (ref 3.5–5.2)
POTASSIUM SERPL-SCNC: 4.7 MMOL/L (ref 3.5–5.2)
PROT SERPL-MCNC: 7.2 G/DL (ref 6–8.5)
PROT UR QL STRIP: NEGATIVE
RBC # BLD AUTO: 4.32 10*6/MM3 (ref 3.77–5.28)
RBC # UR: ABNORMAL /HPF
REF LAB TEST METHOD: ABNORMAL
SARS-COV-2 RNA PNL SPEC NAA+PROBE: NOT DETECTED
SODIUM SERPL-SCNC: 119 MMOL/L (ref 136–145)
SODIUM SERPL-SCNC: 122 MMOL/L (ref 136–145)
SODIUM SERPL-SCNC: 123 MMOL/L (ref 136–145)
SP GR UR STRIP: 1.02 (ref 1–1.03)
SQUAMOUS #/AREA URNS HPF: ABNORMAL /HPF
TROPONIN T SERPL-MCNC: <0.01 NG/ML (ref 0–0.03)
UROBILINOGEN UR QL STRIP: ABNORMAL
WBC # BLD AUTO: 11.54 10*3/MM3 (ref 3.4–10.8)
WBC UR QL AUTO: ABNORMAL /HPF

## 2021-08-10 PROCEDURE — 84484 ASSAY OF TROPONIN QUANT: CPT | Performed by: PHYSICIAN ASSISTANT

## 2021-08-10 PROCEDURE — 83036 HEMOGLOBIN GLYCOSYLATED A1C: CPT | Performed by: INTERNAL MEDICINE

## 2021-08-10 PROCEDURE — 74177 CT ABD & PELVIS W/CONTRAST: CPT

## 2021-08-10 PROCEDURE — 71045 X-RAY EXAM CHEST 1 VIEW: CPT

## 2021-08-10 PROCEDURE — 82962 GLUCOSE BLOOD TEST: CPT

## 2021-08-10 PROCEDURE — 81001 URINALYSIS AUTO W/SCOPE: CPT | Performed by: PHYSICIAN ASSISTANT

## 2021-08-10 PROCEDURE — 25010000002 IOPAMIDOL 61 % SOLUTION: Performed by: EMERGENCY MEDICINE

## 2021-08-10 PROCEDURE — 25010000002 PROCHLORPERAZINE 10 MG/2ML SOLUTION: Performed by: PHYSICIAN ASSISTANT

## 2021-08-10 PROCEDURE — 93005 ELECTROCARDIOGRAM TRACING: CPT | Performed by: PHYSICIAN ASSISTANT

## 2021-08-10 PROCEDURE — 85025 COMPLETE CBC W/AUTO DIFF WBC: CPT | Performed by: PHYSICIAN ASSISTANT

## 2021-08-10 PROCEDURE — 80053 COMPREHEN METABOLIC PANEL: CPT | Performed by: PHYSICIAN ASSISTANT

## 2021-08-10 PROCEDURE — 99284 EMERGENCY DEPT VISIT MOD MDM: CPT

## 2021-08-10 PROCEDURE — 87635 SARS-COV-2 COVID-19 AMP PRB: CPT | Performed by: INTERNAL MEDICINE

## 2021-08-10 PROCEDURE — 99223 1ST HOSP IP/OBS HIGH 75: CPT | Performed by: INTERNAL MEDICINE

## 2021-08-10 PROCEDURE — 25010000002 ENOXAPARIN PER 10 MG: Performed by: INTERNAL MEDICINE

## 2021-08-10 PROCEDURE — 83690 ASSAY OF LIPASE: CPT | Performed by: PHYSICIAN ASSISTANT

## 2021-08-10 RX ORDER — SODIUM CHLORIDE 0.9 % (FLUSH) 0.9 %
3-10 SYRINGE (ML) INJECTION AS NEEDED
Status: DISCONTINUED | OUTPATIENT
Start: 2021-08-10 | End: 2021-08-11 | Stop reason: HOSPADM

## 2021-08-10 RX ORDER — ESCITALOPRAM OXALATE 10 MG/1
10 TABLET ORAL DAILY
Status: DISCONTINUED | OUTPATIENT
Start: 2021-08-10 | End: 2021-08-11 | Stop reason: HOSPADM

## 2021-08-10 RX ORDER — DONEPEZIL HYDROCHLORIDE 5 MG/1
10 TABLET, FILM COATED ORAL NIGHTLY
Status: DISCONTINUED | OUTPATIENT
Start: 2021-08-10 | End: 2021-08-11 | Stop reason: HOSPADM

## 2021-08-10 RX ORDER — ACETAMINOPHEN 325 MG/1
650 TABLET ORAL EVERY 4 HOURS PRN
Status: DISCONTINUED | OUTPATIENT
Start: 2021-08-10 | End: 2021-08-11 | Stop reason: HOSPADM

## 2021-08-10 RX ORDER — LEVOTHYROXINE SODIUM 0.03 MG/1
25 TABLET ORAL DAILY
Status: DISCONTINUED | OUTPATIENT
Start: 2021-08-11 | End: 2021-08-11 | Stop reason: HOSPADM

## 2021-08-10 RX ORDER — PROCHLORPERAZINE EDISYLATE 5 MG/ML
10 INJECTION INTRAMUSCULAR; INTRAVENOUS ONCE
Status: COMPLETED | OUTPATIENT
Start: 2021-08-10 | End: 2021-08-10

## 2021-08-10 RX ORDER — SODIUM CHLORIDE 0.9 % (FLUSH) 0.9 %
10 SYRINGE (ML) INJECTION AS NEEDED
Status: DISCONTINUED | OUTPATIENT
Start: 2021-08-10 | End: 2021-08-11 | Stop reason: HOSPADM

## 2021-08-10 RX ORDER — DEXTROSE MONOHYDRATE 25 G/50ML
25 INJECTION, SOLUTION INTRAVENOUS
Status: DISCONTINUED | OUTPATIENT
Start: 2021-08-10 | End: 2021-08-11 | Stop reason: HOSPADM

## 2021-08-10 RX ORDER — CARBOXYMETHYLCELLULOSE SODIUM 5 MG/ML
1 SOLUTION/ DROPS OPHTHALMIC 3 TIMES DAILY PRN
Status: DISCONTINUED | OUTPATIENT
Start: 2021-08-10 | End: 2021-08-11 | Stop reason: HOSPADM

## 2021-08-10 RX ORDER — ACETAMINOPHEN 650 MG/1
650 SUPPOSITORY RECTAL EVERY 4 HOURS PRN
Status: DISCONTINUED | OUTPATIENT
Start: 2021-08-10 | End: 2021-08-11 | Stop reason: HOSPADM

## 2021-08-10 RX ORDER — ACETAMINOPHEN 160 MG/5ML
650 SOLUTION ORAL EVERY 4 HOURS PRN
Status: DISCONTINUED | OUTPATIENT
Start: 2021-08-10 | End: 2021-08-11 | Stop reason: HOSPADM

## 2021-08-10 RX ORDER — FLUTICASONE PROPIONATE 50 MCG
2 SPRAY, SUSPENSION (ML) NASAL DAILY
Status: DISCONTINUED | OUTPATIENT
Start: 2021-08-10 | End: 2021-08-11 | Stop reason: HOSPADM

## 2021-08-10 RX ORDER — ASPIRIN 81 MG/1
81 TABLET, CHEWABLE ORAL DAILY
Status: DISCONTINUED | OUTPATIENT
Start: 2021-08-10 | End: 2021-08-11 | Stop reason: HOSPADM

## 2021-08-10 RX ORDER — NICOTINE POLACRILEX 4 MG
1 LOZENGE BUCCAL
Status: DISCONTINUED | OUTPATIENT
Start: 2021-08-10 | End: 2021-08-11 | Stop reason: HOSPADM

## 2021-08-10 RX ORDER — ATORVASTATIN CALCIUM 20 MG/1
20 TABLET, FILM COATED ORAL DAILY
Status: DISCONTINUED | OUTPATIENT
Start: 2021-08-10 | End: 2021-08-11 | Stop reason: HOSPADM

## 2021-08-10 RX ORDER — ATENOLOL 50 MG/1
50 TABLET ORAL DAILY
Status: DISCONTINUED | OUTPATIENT
Start: 2021-08-10 | End: 2021-08-11 | Stop reason: HOSPADM

## 2021-08-10 RX ORDER — SUCRALFATE 1 G/1
1 TABLET ORAL 4 TIMES DAILY
Status: DISCONTINUED | OUTPATIENT
Start: 2021-08-10 | End: 2021-08-11 | Stop reason: HOSPADM

## 2021-08-10 RX ORDER — SODIUM CHLORIDE 0.9 % (FLUSH) 0.9 %
3 SYRINGE (ML) INJECTION EVERY 12 HOURS SCHEDULED
Status: DISCONTINUED | OUTPATIENT
Start: 2021-08-10 | End: 2021-08-11 | Stop reason: HOSPADM

## 2021-08-10 RX ORDER — DICYCLOMINE HYDROCHLORIDE 10 MG/1
10 CAPSULE ORAL 3 TIMES DAILY PRN
Status: DISCONTINUED | OUTPATIENT
Start: 2021-08-10 | End: 2021-08-11 | Stop reason: HOSPADM

## 2021-08-10 RX ORDER — ONDANSETRON 2 MG/ML
4 INJECTION INTRAMUSCULAR; INTRAVENOUS EVERY 6 HOURS PRN
Status: DISCONTINUED | OUTPATIENT
Start: 2021-08-10 | End: 2021-08-11 | Stop reason: HOSPADM

## 2021-08-10 RX ORDER — SODIUM CHLORIDE 9 MG/ML
100 INJECTION, SOLUTION INTRAVENOUS CONTINUOUS
Status: DISCONTINUED | OUTPATIENT
Start: 2021-08-10 | End: 2021-08-11 | Stop reason: HOSPADM

## 2021-08-10 RX ORDER — PANTOPRAZOLE SODIUM 40 MG/1
40 TABLET, DELAYED RELEASE ORAL
Refills: 3 | Status: DISCONTINUED | OUTPATIENT
Start: 2021-08-11 | End: 2021-08-11 | Stop reason: HOSPADM

## 2021-08-10 RX ADMIN — ESCITALOPRAM OXALATE 10 MG: 10 TABLET ORAL at 16:07

## 2021-08-10 RX ADMIN — SODIUM CHLORIDE 100 ML/HR: 9 INJECTION, SOLUTION INTRAVENOUS at 14:49

## 2021-08-10 RX ADMIN — DONEPEZIL HYDROCHLORIDE 10 MG: 5 TABLET ORAL at 21:08

## 2021-08-10 RX ADMIN — SUCRALFATE 1 G: 1 TABLET ORAL at 18:30

## 2021-08-10 RX ADMIN — ASPIRIN 81 MG CHEWABLE TABLET 81 MG: 81 TABLET CHEWABLE at 16:07

## 2021-08-10 RX ADMIN — IOPAMIDOL 100 ML: 612 INJECTION, SOLUTION INTRAVENOUS at 11:28

## 2021-08-10 RX ADMIN — ACETAMINOPHEN 650 MG: 325 TABLET ORAL at 16:07

## 2021-08-10 RX ADMIN — ATORVASTATIN CALCIUM 20 MG: 20 TABLET, FILM COATED ORAL at 16:07

## 2021-08-10 RX ADMIN — SUCRALFATE 1 G: 1 TABLET ORAL at 21:08

## 2021-08-10 RX ADMIN — ATENOLOL 50 MG: 50 TABLET ORAL at 16:07

## 2021-08-10 RX ADMIN — FLUTICASONE PROPIONATE 2 SPRAY: 50 SPRAY, METERED NASAL at 16:12

## 2021-08-10 RX ADMIN — SODIUM CHLORIDE 1000 ML: 9 INJECTION, SOLUTION INTRAVENOUS at 10:46

## 2021-08-10 RX ADMIN — SODIUM CHLORIDE, PRESERVATIVE FREE 3 ML: 5 INJECTION INTRAVENOUS at 21:08

## 2021-08-10 RX ADMIN — ENOXAPARIN SODIUM 40 MG: 40 INJECTION SUBCUTANEOUS at 16:07

## 2021-08-10 RX ADMIN — SODIUM CHLORIDE, PRESERVATIVE FREE 10 ML: 5 INJECTION INTRAVENOUS at 14:49

## 2021-08-10 RX ADMIN — PROCHLORPERAZINE EDISYLATE 10 MG: 5 INJECTION INTRAMUSCULAR; INTRAVENOUS at 10:46

## 2021-08-11 VITALS
DIASTOLIC BLOOD PRESSURE: 68 MMHG | TEMPERATURE: 97.7 F | OXYGEN SATURATION: 96 % | SYSTOLIC BLOOD PRESSURE: 155 MMHG | HEART RATE: 59 BPM | WEIGHT: 165.12 LBS | HEIGHT: 62 IN | BODY MASS INDEX: 30.39 KG/M2 | RESPIRATION RATE: 18 BRPM

## 2021-08-11 LAB
ANION GAP SERPL CALCULATED.3IONS-SCNC: 7.9 MMOL/L (ref 5–15)
BASOPHILS # BLD AUTO: 0.02 10*3/MM3 (ref 0–0.2)
BASOPHILS NFR BLD AUTO: 0.3 % (ref 0–1.5)
BUN SERPL-MCNC: 12 MG/DL (ref 8–23)
BUN/CREAT SERPL: 11.3 (ref 7–25)
CALCIUM SPEC-SCNC: 8.4 MG/DL (ref 8.6–10.5)
CHLORIDE SERPL-SCNC: 94 MMOL/L (ref 98–107)
CO2 SERPL-SCNC: 25.1 MMOL/L (ref 22–29)
CREAT SERPL-MCNC: 1.06 MG/DL (ref 0.57–1)
DEPRECATED RDW RBC AUTO: 36.2 FL (ref 37–54)
EOSINOPHIL # BLD AUTO: 0.08 10*3/MM3 (ref 0–0.4)
EOSINOPHIL NFR BLD AUTO: 1.2 % (ref 0.3–6.2)
ERYTHROCYTE [DISTWIDTH] IN BLOOD BY AUTOMATED COUNT: 12 % (ref 12.3–15.4)
GFR SERPL CREATININE-BSD FRML MDRD: 51 ML/MIN/1.73
GLUCOSE BLDC GLUCOMTR-MCNC: 105 MG/DL (ref 70–130)
GLUCOSE BLDC GLUCOMTR-MCNC: 155 MG/DL (ref 70–130)
GLUCOSE SERPL-MCNC: 98 MG/DL (ref 65–99)
HCT VFR BLD AUTO: 32.9 % (ref 34–46.6)
HGB BLD-MCNC: 11.4 G/DL (ref 12–15.9)
IMM GRANULOCYTES # BLD AUTO: 0.06 10*3/MM3 (ref 0–0.05)
IMM GRANULOCYTES NFR BLD AUTO: 0.9 % (ref 0–0.5)
LYMPHOCYTES # BLD AUTO: 2.1 10*3/MM3 (ref 0.7–3.1)
LYMPHOCYTES NFR BLD AUTO: 31.1 % (ref 19.6–45.3)
MCH RBC QN AUTO: 29.1 PG (ref 26.6–33)
MCHC RBC AUTO-ENTMCNC: 34.7 G/DL (ref 31.5–35.7)
MCV RBC AUTO: 83.9 FL (ref 79–97)
MONOCYTES # BLD AUTO: 0.71 10*3/MM3 (ref 0.1–0.9)
MONOCYTES NFR BLD AUTO: 10.5 % (ref 5–12)
NEUTROPHILS NFR BLD AUTO: 3.78 10*3/MM3 (ref 1.7–7)
NEUTROPHILS NFR BLD AUTO: 56 % (ref 42.7–76)
NRBC BLD AUTO-RTO: 0 /100 WBC (ref 0–0.2)
PLATELET # BLD AUTO: 246 10*3/MM3 (ref 140–450)
PMV BLD AUTO: 10 FL (ref 6–12)
POTASSIUM SERPL-SCNC: 4.3 MMOL/L (ref 3.5–5.2)
RBC # BLD AUTO: 3.92 10*6/MM3 (ref 3.77–5.28)
SODIUM SERPL-SCNC: 127 MMOL/L (ref 136–145)
WBC # BLD AUTO: 6.75 10*3/MM3 (ref 3.4–10.8)

## 2021-08-11 PROCEDURE — 82962 GLUCOSE BLOOD TEST: CPT

## 2021-08-11 PROCEDURE — 63710000001 INSULIN ASPART PER 5 UNITS: Performed by: INTERNAL MEDICINE

## 2021-08-11 PROCEDURE — 85025 COMPLETE CBC W/AUTO DIFF WBC: CPT | Performed by: INTERNAL MEDICINE

## 2021-08-11 PROCEDURE — 99238 HOSP IP/OBS DSCHRG MGMT 30/<: CPT | Performed by: INTERNAL MEDICINE

## 2021-08-11 PROCEDURE — 80048 BASIC METABOLIC PNL TOTAL CA: CPT | Performed by: INTERNAL MEDICINE

## 2021-08-11 RX ORDER — LISINOPRIL 20 MG/1
20 TABLET ORAL DAILY
Qty: 30 TABLET | Refills: 0 | Status: SHIPPED | OUTPATIENT
Start: 2021-08-11 | End: 2022-09-22

## 2021-08-11 RX ADMIN — ATENOLOL 50 MG: 50 TABLET ORAL at 08:07

## 2021-08-11 RX ADMIN — ACETAMINOPHEN 650 MG: 325 TABLET ORAL at 11:33

## 2021-08-11 RX ADMIN — LEVOTHYROXINE SODIUM 25 MCG: 25 TABLET ORAL at 08:07

## 2021-08-11 RX ADMIN — ESCITALOPRAM OXALATE 10 MG: 10 TABLET ORAL at 08:07

## 2021-08-11 RX ADMIN — SUCRALFATE 1 G: 1 TABLET ORAL at 12:41

## 2021-08-11 RX ADMIN — FLUTICASONE PROPIONATE 2 SPRAY: 50 SPRAY, METERED NASAL at 08:11

## 2021-08-11 RX ADMIN — SUCRALFATE 1 G: 1 TABLET ORAL at 08:07

## 2021-08-11 RX ADMIN — ATORVASTATIN CALCIUM 20 MG: 20 TABLET, FILM COATED ORAL at 08:07

## 2021-08-11 RX ADMIN — SODIUM CHLORIDE 100 ML/HR: 9 INJECTION, SOLUTION INTRAVENOUS at 12:45

## 2021-08-11 RX ADMIN — ASPIRIN 81 MG CHEWABLE TABLET 81 MG: 81 TABLET CHEWABLE at 08:07

## 2021-08-11 RX ADMIN — PANTOPRAZOLE SODIUM 40 MG: 40 TABLET, DELAYED RELEASE ORAL at 06:41

## 2021-08-11 RX ADMIN — INSULIN ASPART 2 UNITS: 100 INJECTION, SOLUTION INTRAVENOUS; SUBCUTANEOUS at 12:41

## 2021-08-12 ENCOUNTER — CARE COORDINATION (OUTPATIENT)
Dept: CARE COORDINATION | Age: 76
End: 2021-08-12

## 2021-09-07 ENCOUNTER — OFFICE VISIT (OUTPATIENT)
Dept: GASTROENTEROLOGY | Facility: CLINIC | Age: 76
End: 2021-09-07

## 2021-09-07 ENCOUNTER — LAB (OUTPATIENT)
Dept: LAB | Facility: HOSPITAL | Age: 76
End: 2021-09-07

## 2021-09-07 VITALS
HEART RATE: 61 BPM | BODY MASS INDEX: 28.71 KG/M2 | WEIGHT: 156 LBS | TEMPERATURE: 97.7 F | DIASTOLIC BLOOD PRESSURE: 54 MMHG | RESPIRATION RATE: 16 BRPM | HEIGHT: 62 IN | SYSTOLIC BLOOD PRESSURE: 165 MMHG

## 2021-09-07 DIAGNOSIS — K62.89 ANAL OR RECTAL PAIN: ICD-10-CM

## 2021-09-07 DIAGNOSIS — R19.7 DIARRHEA, UNSPECIFIED TYPE: ICD-10-CM

## 2021-09-07 DIAGNOSIS — R10.84 GENERALIZED ABDOMINAL PAIN: Primary | ICD-10-CM

## 2021-09-07 DIAGNOSIS — R11.0 NAUSEA: ICD-10-CM

## 2021-09-07 DIAGNOSIS — Z86.010 PERSONAL HISTORY OF COLONIC POLYPS: ICD-10-CM

## 2021-09-07 DIAGNOSIS — K76.0 FATTY (CHANGE OF) LIVER, NOT ELSEWHERE CLASSIFIED: ICD-10-CM

## 2021-09-07 DIAGNOSIS — K21.9 GASTROESOPHAGEAL REFLUX DISEASE, UNSPECIFIED WHETHER ESOPHAGITIS PRESENT: ICD-10-CM

## 2021-09-07 DIAGNOSIS — R74.8 ELEVATED ALKALINE PHOSPHATASE LEVEL: ICD-10-CM

## 2021-09-07 LAB
ALBUMIN SERPL-MCNC: 4.3 G/DL (ref 3.5–5.2)
ALBUMIN/GLOB SERPL: 2 G/DL
ALP SERPL-CCNC: 102 U/L (ref 39–117)
ALT SERPL W P-5'-P-CCNC: 19 U/L (ref 1–33)
ANION GAP SERPL CALCULATED.3IONS-SCNC: 11.6 MMOL/L (ref 5–15)
AST SERPL-CCNC: 17 U/L (ref 1–32)
BILIRUB SERPL-MCNC: 0.4 MG/DL (ref 0–1.2)
BUN SERPL-MCNC: 17 MG/DL (ref 8–23)
BUN/CREAT SERPL: 15 (ref 7–25)
CALCIUM SPEC-SCNC: 9.2 MG/DL (ref 8.6–10.5)
CHLORIDE SERPL-SCNC: 101 MMOL/L (ref 98–107)
CO2 SERPL-SCNC: 24.4 MMOL/L (ref 22–29)
CREAT SERPL-MCNC: 1.13 MG/DL (ref 0.57–1)
GFR SERPL CREATININE-BSD FRML MDRD: 47 ML/MIN/1.73
GGT SERPL-CCNC: 18 U/L (ref 5–36)
GLOBULIN UR ELPH-MCNC: 2.2 GM/DL
GLUCOSE SERPL-MCNC: 133 MG/DL (ref 65–99)
POTASSIUM SERPL-SCNC: 4.5 MMOL/L (ref 3.5–5.2)
PROT SERPL-MCNC: 6.5 G/DL (ref 6–8.5)
SODIUM SERPL-SCNC: 137 MMOL/L (ref 136–145)

## 2021-09-07 PROCEDURE — 80053 COMPREHEN METABOLIC PANEL: CPT

## 2021-09-07 PROCEDURE — 82977 ASSAY OF GGT: CPT

## 2021-09-07 PROCEDURE — 84080 ASSAY ALKALINE PHOSPHATASES: CPT

## 2021-09-07 PROCEDURE — 84075 ASSAY ALKALINE PHOSPHATASE: CPT

## 2021-09-07 PROCEDURE — 99214 OFFICE O/P EST MOD 30 MIN: CPT | Performed by: NURSE PRACTITIONER

## 2021-09-07 PROCEDURE — 36415 COLL VENOUS BLD VENIPUNCTURE: CPT

## 2021-09-07 RX ORDER — ONDANSETRON 4 MG/1
4 TABLET, FILM COATED ORAL EVERY 8 HOURS PRN
Qty: 30 TABLET | Refills: 1 | Status: SHIPPED | OUTPATIENT
Start: 2021-09-07 | End: 2023-01-12

## 2021-09-08 ENCOUNTER — TELEPHONE (OUTPATIENT)
Dept: GASTROENTEROLOGY | Facility: CLINIC | Age: 76
End: 2021-09-08

## 2021-09-08 LAB
ALP BONE CFR SERPL: 17 % (ref 14–68)
ALP INTEST CFR SERPL: 7 % (ref 0–18)
ALP LIVER CFR SERPL: 76 % (ref 18–85)
ALP SERPL-CCNC: 111 IU/L (ref 48–121)

## 2021-09-09 ENCOUNTER — OFFICE VISIT (OUTPATIENT)
Dept: PRIMARY CARE CLINIC | Age: 76
End: 2021-09-09
Payer: MEDICARE

## 2021-09-09 VITALS
HEART RATE: 68 BPM | DIASTOLIC BLOOD PRESSURE: 70 MMHG | BODY MASS INDEX: 28.34 KG/M2 | WEIGHT: 154 LBS | RESPIRATION RATE: 16 BRPM | SYSTOLIC BLOOD PRESSURE: 150 MMHG | OXYGEN SATURATION: 98 % | HEIGHT: 62 IN | TEMPERATURE: 97.7 F

## 2021-09-09 DIAGNOSIS — I15.9 SECONDARY HYPERTENSION: ICD-10-CM

## 2021-09-09 DIAGNOSIS — E03.9 ACQUIRED HYPOTHYROIDISM: ICD-10-CM

## 2021-09-09 DIAGNOSIS — K21.9 GASTROESOPHAGEAL REFLUX DISEASE, UNSPECIFIED WHETHER ESOPHAGITIS PRESENT: ICD-10-CM

## 2021-09-09 DIAGNOSIS — E78.00 PURE HYPERCHOLESTEROLEMIA: ICD-10-CM

## 2021-09-09 DIAGNOSIS — E11.9 TYPE 2 DIABETES MELLITUS WITHOUT COMPLICATION, WITHOUT LONG-TERM CURRENT USE OF INSULIN (HCC): ICD-10-CM

## 2021-09-09 DIAGNOSIS — K58.0 IRRITABLE BOWEL SYNDROME WITH DIARRHEA: ICD-10-CM

## 2021-09-09 DIAGNOSIS — E87.1 HYPONATREMIA: Primary | ICD-10-CM

## 2021-09-09 PROCEDURE — G8417 CALC BMI ABV UP PARAM F/U: HCPCS | Performed by: NURSE PRACTITIONER

## 2021-09-09 PROCEDURE — 1036F TOBACCO NON-USER: CPT | Performed by: NURSE PRACTITIONER

## 2021-09-09 PROCEDURE — 3017F COLORECTAL CA SCREEN DOC REV: CPT | Performed by: NURSE PRACTITIONER

## 2021-09-09 PROCEDURE — 1123F ACP DISCUSS/DSCN MKR DOCD: CPT | Performed by: NURSE PRACTITIONER

## 2021-09-09 PROCEDURE — 4040F PNEUMOC VAC/ADMIN/RCVD: CPT | Performed by: NURSE PRACTITIONER

## 2021-09-09 PROCEDURE — G8427 DOCREV CUR MEDS BY ELIG CLIN: HCPCS | Performed by: NURSE PRACTITIONER

## 2021-09-09 PROCEDURE — G8399 PT W/DXA RESULTS DOCUMENT: HCPCS | Performed by: NURSE PRACTITIONER

## 2021-09-09 PROCEDURE — 2022F DILAT RTA XM EVC RTNOPTHY: CPT | Performed by: NURSE PRACTITIONER

## 2021-09-09 PROCEDURE — 1111F DSCHRG MED/CURRENT MED MERGE: CPT | Performed by: NURSE PRACTITIONER

## 2021-09-09 PROCEDURE — 99214 OFFICE O/P EST MOD 30 MIN: CPT | Performed by: NURSE PRACTITIONER

## 2021-09-09 PROCEDURE — 1090F PRES/ABSN URINE INCON ASSESS: CPT | Performed by: NURSE PRACTITIONER

## 2021-09-09 PROCEDURE — 3044F HG A1C LEVEL LT 7.0%: CPT | Performed by: NURSE PRACTITIONER

## 2021-09-09 RX ORDER — SUCRALFATE 1 G/1
1 TABLET ORAL 4 TIMES DAILY
COMMUNITY
Start: 2021-08-10 | End: 2022-06-23 | Stop reason: SDUPTHER

## 2021-09-09 RX ORDER — ATENOLOL 50 MG/1
TABLET ORAL
Qty: 90 TABLET | Refills: 1 | Status: SHIPPED | OUTPATIENT
Start: 2021-09-09 | End: 2022-03-10

## 2021-09-09 RX ORDER — LISINOPRIL 30 MG/1
30 TABLET ORAL DAILY
Qty: 30 TABLET | Refills: 5 | Status: SHIPPED | OUTPATIENT
Start: 2021-09-09 | End: 2022-01-05 | Stop reason: DRUGHIGH

## 2021-09-09 RX ORDER — DICYCLOMINE HCL 20 MG
20 TABLET ORAL EVERY 6 HOURS
COMMUNITY
Start: 2021-08-03 | End: 2022-06-23 | Stop reason: SDUPTHER

## 2021-09-09 RX ORDER — CARBOXYMETHYLCELLULOSE SODIUM 5 MG/ML
1 SOLUTION/ DROPS OPHTHALMIC DAILY PRN
COMMUNITY
End: 2021-09-09 | Stop reason: SDUPTHER

## 2021-09-09 RX ORDER — ONDANSETRON 4 MG/1
4 TABLET, FILM COATED ORAL EVERY 8 HOURS PRN
COMMUNITY
Start: 2021-09-07 | End: 2022-06-23 | Stop reason: ALTCHOICE

## 2021-09-09 RX ORDER — ESOMEPRAZOLE MAGNESIUM 40 MG/1
CAPSULE, DELAYED RELEASE ORAL
COMMUNITY
Start: 2021-08-09 | End: 2021-09-09 | Stop reason: SDUPTHER

## 2021-09-09 RX ORDER — CARBOXYMETHYLCELLULOSE SODIUM 5 MG/ML
1 SOLUTION/ DROPS OPHTHALMIC DAILY PRN
Qty: 1 EACH | Refills: 3 | Status: SHIPPED | OUTPATIENT
Start: 2021-09-09 | End: 2022-06-23 | Stop reason: SDUPTHER

## 2021-09-09 RX ORDER — LISINOPRIL 20 MG/1
20 TABLET ORAL DAILY
COMMUNITY
Start: 2021-08-11 | End: 2021-10-05 | Stop reason: DRUGHIGH

## 2021-09-09 RX ORDER — ESOMEPRAZOLE MAGNESIUM 40 MG/1
CAPSULE, DELAYED RELEASE ORAL
Qty: 90 CAPSULE | Refills: 3 | Status: SHIPPED | OUTPATIENT
Start: 2021-09-09 | End: 2022-06-23 | Stop reason: SDUPTHER

## 2021-09-09 RX ORDER — LISINOPRIL 20 MG/1
20 TABLET ORAL DAILY
Qty: 90 TABLET | Refills: 3 | Status: CANCELLED | OUTPATIENT
Start: 2021-09-09

## 2021-09-09 SDOH — ECONOMIC STABILITY: FOOD INSECURITY: WITHIN THE PAST 12 MONTHS, THE FOOD YOU BOUGHT JUST DIDN'T LAST AND YOU DIDN'T HAVE MONEY TO GET MORE.: NEVER TRUE

## 2021-09-09 SDOH — ECONOMIC STABILITY: FOOD INSECURITY: WITHIN THE PAST 12 MONTHS, YOU WORRIED THAT YOUR FOOD WOULD RUN OUT BEFORE YOU GOT MONEY TO BUY MORE.: NEVER TRUE

## 2021-09-09 ASSESSMENT — ENCOUNTER SYMPTOMS
ABDOMINAL PAIN: 1
NAUSEA: 1
ALLERGIC/IMMUNOLOGIC NEGATIVE: 1
DIARRHEA: 1
RESPIRATORY NEGATIVE: 1
GASTROINTESTINAL NEGATIVE: 1
EYES NEGATIVE: 1

## 2021-09-09 ASSESSMENT — SOCIAL DETERMINANTS OF HEALTH (SDOH): HOW HARD IS IT FOR YOU TO PAY FOR THE VERY BASICS LIKE FOOD, HOUSING, MEDICAL CARE, AND HEATING?: NOT HARD AT ALL

## 2021-09-09 NOTE — PROGRESS NOTES
Chief Complaint   Patient presents with    Follow-up    Hypertension    Follow-Up from [place] [address] Irritable Bowel Syndrome       Have you seen any other physician or provider since your last visit yes [facility] ER and hospital admission for IBS, Gastroenterologist yesterday    Have you had any other diagnostic tests since your last visit? yes -     Have you changed or stopped any medications since your last visit? yes - started Lisinopril and stopped Irbesartan            SUBJECTIVE:    Patient Karen Mandujano is a 78 y.o. female. Chief Complaint   Patient presents with    Follow-up    Hypertension    Follow-Up from [place] [address] Irritable Bowel Syndrome     HPI:      Patient's medications, allergies, past medical, surgical, social and family histories were reviewed and updated as appropriate. Review of Systems   Constitutional: Negative. HENT: Negative. Eyes: Negative. Respiratory: Negative. Cardiovascular: Negative. Gastrointestinal: Positive for abdominal pain, diarrhea and nausea. Endocrine: Negative. Genitourinary: Negative. Musculoskeletal: Negative. Skin: Negative. Allergic/Immunologic: Negative. Neurological: Negative. Hematological: Negative. Psychiatric/Behavioral: Negative. OBJECTIVE:  BP (!) 150/78 (Site: Left Upper Arm, Position: Sitting, Cuff Size: Medium Adult)   Pulse 68   Temp 97.7 °F (36.5 °C) (Temporal)   Resp 16   Ht 5' 2\" (1.575 m)   Wt 154 lb (69.9 kg)   SpO2 98% Comment: room air  BMI 28.17 kg/m²    Physical Exam    No results found for requested labs within last 30 days.        Hemoglobin A1C (%)   Date Value   03/16/2021 6.9 (H)     Microalbumin, Random Urine (mg/dL)   Date Value   04/01/2019 <1.20     LDL Calculated (mg/dL)   Date Value   03/16/2021 68         Lab Results   Component Value Date    WBC 6.4 03/16/2021    NEUTROABS 3.4 04/07/2015    HGB 12.4 03/16/2021    HCT 39.4 03/16/2021    MCV 91.4 03/16/2021     03/16/2021       Lab Results   Component Value Date    TSH 3.01 03/16/2021         ASSESSMENT/PLAN:     There are no diagnoses linked to this encounter.      Written by Pablito HERZOG, acting as a scribe for Nicholas Organ on 9/9/2021 at 4:27 PM.

## 2021-09-09 NOTE — PROGRESS NOTES
Post-Discharge Transitional Care Management Services or Hospital Follow Up      Deo Jasso   YOB: 1945    Date of Office Visit:  9/9/2021  Date of Hospital Admission: 12/27/18  Date of Hospital Discharge: 12/27/18  Readmission Risk Score(high >=14%.  Medium >=10%):No data recorded    Care management risk score Rising risk (score 2-5) and Complex Care (Scores >=6): 2     Non face to face  following discharge, date last encounter closed (first attempt may have been earlier): *No documented post hospital discharge outreach found in the last 14 days *No documented post hospital discharge outreach found in the last 14 days    Call initiated 2 business days of discharge: *No response recorded in the last 14 days     Patient Active Problem List   Diagnosis    HTN (hypertension)    Hyperlipemia    Hypothyroid    Type 2 diabetes mellitus without complication, without long-term current use of insulin ([facility] [address]    Osteoporosis    Vitamin D deficiency    Vitamin B12 deficiency    Lesion of skin of scalp    Chronic allergic rhinitis    Acute bacterial sinusitis       Allergies   Allergen Reactions    Latex Rash    Ampicillin     Codeine     Erythromycin     Pcn [Penicillins]        Medications listed as ordered at the time of discharge from hospital   [place], [address] [facility]  [address] Medication Instructions EUGENIO:    Printed on:09/09/21 3366   Medication Information                      Accu-Chek Multiclix Lancets MISC  Finger stick tid             alendronate (FOSAMAX) 70 MG tablet  take 1 tablet by mouth every week             aspirin 81 MG EC tablet  Take 1 tablet by mouth daily             atenolol (TENORMIN) 50 MG tablet  take 1 tablet by mouth once daily             atorvastatin (LIPITOR) 20 MG tablet  Take 1 tablet by mouth daily             Biotin 5000 MCG TABS  Take by mouth             blood glucose test strips (ACCU-CHEK TAYLA PLUS) strip  use 1 three times a day             calcium carbonate 600 MG TABS tablet  Take 1 tablet by mouth daily. carboxymethylcellulose (REFRESH PLUS) 0.5 % SOLN ophthalmic solution  Apply 1 drop to eye daily as needed (dry eyes)             dicyclomine (BENTYL) 20 MG tablet  Take 20 mg by mouth every 6 hours              donepezil (ARICEPT) 10 MG tablet  Take 1 tablet by mouth nightly             ergocalciferol (DRISDOL) 1.25 MG (71776 UT) capsule  Take 1 capsule by mouth once a week 90 day supply             escitalopram (LEXAPRO) 10 MG tablet  take 1 tablet by mouth once daily             esomeprazole (NEXIUM) 40 MG delayed release capsule  1 po daily in the am 30 minutes before breakfast             fexofenadine (ALLEGRA) 180 MG tablet  Take 1 tablet by mouth daily             fluticasone (FLONASE) 50 MCG/ACT nasal spray  instill 2 sprays into each nostril once daily. 90 day supply             Lactobacillus (PROBIOTIC ACIDOPHILUS PO)  Take by mouth             levothyroxine (SYNTHROID) 25 MCG tablet  take 1 tablet by mouth once daily             lisinopril (PRINIVIL;ZESTRIL) 20 MG tablet  Take 20 mg by mouth daily             lisinopril (PRINIVIL;ZESTRIL) 30 MG tablet  Take 1 tablet by mouth daily             metFORMIN (GLUCOPHAGE) 500 MG tablet  Take 1 tablet by mouth 2 times daily (with meals)             montelukast (SINGULAIR) 10 MG tablet  take 1 tablet by mouth at bedtime             nitroGLYCERIN (NITROSTAT) 0.4 MG SL tablet  Place 1 tablet under the tongue as needed for Chest pain             nystatin-triamcinolone (MYCOLOG II) 315366-7.1 UNIT/GM-% cream  Apply topically 2 times daily.              ondansetron (ZOFRAN) 4 MG tablet  Take 4 mg by mouth every 8 hours as needed             sucralfate (CARAFATE) 1 GM tablet  Take 1 g by mouth 4 times daily                    Medications marked \"taking\" at this time  Outpatient Medications Marked as Taking for the 9/9/21 encounter (Office Visit) with MICKI Gleason   Medication Sig Dispense Refill   [facility] lisinopril (PRINIVIL;ZESTRIL) 20 MG tablet Take 20 mg by mouth daily      sucralfate (CARAFATE) 1 GM tablet Take 1 g by mouth 4 times daily       ondansetron (ZOFRAN) 4 MG tablet Take 4 mg by mouth every 8 hours as needed      dicyclomine (BENTYL) 20 MG tablet Take 20 mg by mouth every 6 hours       atenolol (TENORMIN) 50 MG tablet take 1 tablet by mouth once daily 90 tablet 1    carboxymethylcellulose (REFRESH PLUS) 0.5 % SOLN ophthalmic solution Apply 1 drop to eye daily as needed (dry eyes) 1 each 3    esomeprazole (NEXIUM) 40 MG delayed release capsule 1 po daily in the am 30 minutes before breakfast 90 capsule 3    lisinopril (PRINIVIL;ZESTRIL) 30 MG tablet Take 1 tablet by mouth daily 30 tablet 5    montelukast (SINGULAIR) 10 MG tablet take 1 tablet by mouth at bedtime 90 tablet 3    metFORMIN (GLUCOPHAGE) 500 MG tablet Take 1 tablet by mouth 2 times daily (with meals) (Patient taking differently: Take 500 mg by mouth 3 times daily (with meals) ) 180 tablet 3    levothyroxine (SYNTHROID) 25 MCG tablet take 1 tablet by mouth once daily 90 tablet 3    fexofenadine (ALLEGRA) 180 MG tablet Take 1 tablet by mouth daily 90 tablet 3    fluticasone (FLONASE) 50 MCG/ACT nasal spray instill 2 sprays into each nostril once daily.  90 day supply 16 g 3    escitalopram (LEXAPRO) 10 MG tablet take 1 tablet by mouth once daily 90 tablet 3    ergocalciferol (DRISDOL) 1.25 MG (73116 UT) capsule Take 1 capsule by mouth once a week 90 day supply 12 capsule 3    donepezil (ARICEPT) 10 MG tablet Take 1 tablet by mouth nightly 90 tablet 1    atorvastatin (LIPITOR) 20 MG tablet Take 1 tablet by mouth daily 90 tablet 3    alendronate (FOSAMAX) 70 MG tablet take 1 tablet by mouth every week 12 tablet 3    Accu-Chek Multiclix Lancets MISC Finger stick tid 100 each 3    blood glucose test strips (ACCU-CHEK TAYLA PLUS) strip use 1 three times a day 300 strip 3    nystatin-triamcinolone (MYCOLOG II) 956536-5.1 UNIT/GM-% cream Apply topically 2 times daily. 60 g 3    nitroGLYCERIN (NITROSTAT) 0.4 MG SL tablet Place 1 tablet under the tongue as needed for Chest pain 25 tablet 3    aspirin 81 MG EC tablet Take 1 tablet by mouth daily 90 tablet 3    Biotin 5000 MCG TABS Take by mouth      Lactobacillus (PROBIOTIC ACIDOPHILUS PO) Take by mouth      calcium carbonate 600 MG TABS tablet Take 1 tablet by mouth daily. Medications patient taking as of now reconciled against medications ordered at time of hospital discharge: Yes    Chief Complaint   Patient presents with    Follow-up    Hypertension    Follow-Up from [place] [address] Irritable Bowel Syndrome       HPI   She was hospitalized for low sodium. She was brought into the hospital by her neighbor who said she was confused. Weak and having n/v and diarrhea. She was found to be in acute renal failure. Patient has had hypertension for several years. She has been compliant with taking the new medications, without side effects from it. She has been following a low-sodium, is active and occasionally exercises. Weight is decreasing rapidly, compared to last visit due to her IBS. Her blood pressure is elevated 150/78 at this time. Her BP medication was changed to Lisinopril while she was admitted to [facility].  Inpatient course: Discharge summary reviewed- see chart. Interval history/Current status: good    Review of Systems   Constitutional: Negative. HENT: Negative. Respiratory: Negative. Cardiovascular: Negative. Gastrointestinal: Negative. Genitourinary: Negative. Musculoskeletal: Negative. Skin: Negative. Neurological: Negative. Psychiatric/Behavioral: Negative.         Vitals:    09/09/21 1626 09/09/21 1644   BP: (!) 150/78 (!) 150/70   Site: Left Upper Arm Right Upper Arm   Position: Sitting Sitting   Cuff Size: Medium Adult Medium Adult   Pulse: 68    Resp: 16    Temp: 97.7 °F (36.5 °C)    TempSrc: Temporal SpO2: 98%    Weight: 154 lb (69.9 kg)    Height: 5' 2\" (1.575 m)      Body mass index is 28.17 kg/m². Wt Readings from Last 3 Encounters:   09/09/21 154 lb (69.9 kg)   06/16/21 160 lb 3.2 oz (72.7 kg)   05/20/21 159 lb 9.6 oz (72.4 kg)     BP Readings from Last 3 Encounters:   09/09/21 (!) 150/70   06/16/21 (!) 140/72   05/20/21 (!) 156/74       Physical Exam  Vitals and nursing note reviewed. Constitutional:       Appearance: She is well-developed. HENT:      Head: Normocephalic and atraumatic. Eyes:      Conjunctiva/sclera: Conjunctivae normal.      Pupils: Pupils are equal, round, and reactive to light. Neck:      Thyroid: No thyromegaly. Vascular: No JVD. Cardiovascular:      Rate and Rhythm: Normal rate and regular rhythm. Heart sounds: No murmur heard. No friction rub. No gallop. Pulmonary:      Effort: Pulmonary effort is normal. No respiratory distress. Breath sounds: Normal breath sounds. No wheezing or rales. Abdominal:      General: Bowel sounds are normal. There is no distension. Palpations: Abdomen is soft. Tenderness: There is no abdominal tenderness. There is no guarding. Musculoskeletal:         General: No tenderness. Cervical back: Normal range of motion and neck supple. Skin:     General: Skin is warm and dry. Findings: No rash. Neurological:      Mental Status: She is alert and oriented to person, place, and time. Psychiatric:         Judgment: Judgment normal.             Assessment/Plan:  1. Hyponatremia  Will check level for stability.   - BASIC METABOLIC PANEL; Future  - GA DISCHARGE MEDS RECONCILED W/ CURRENT OUTPATIENT MED LIST    2. Gastroesophageal reflux disease, unspecified whether esophagitis present    - sucralfate (CARAFATE) 1 GM tablet; Take 1 g by mouth 4 times daily   - ondansetron (ZOFRAN) 4 MG tablet;  Take 4 mg by mouth every 8 hours as needed  - esomeprazole (NEXIUM) 40 MG delayed release capsule; 1 po daily in the am 30 minutes before breakfast  Dispense: 90 capsule; Refill: 3    3. Secondary hypertension  Change to Lisinopril 30 mg. Blood pressure not under control.     - lisinopril (PRINIVIL;ZESTRIL) 30 MG tablet; Take 1 tablet by mouth daily  Dispense: 30 tablet; Refill: 5  - atenolol (TENORMIN) 50 MG tablet; take 1 tablet by mouth once daily  Dispense: 90 tablet; Refill: 1  4. Pure hypercholesterolemia        5. Acquired hypothyroidism  Stable . I am going to check the TSH every few months. I am going to continue the current dose of Levothyroxine. I have advised her on the importance of taking the medication on a daily basis. Lab Results   Component Value Date    TSH 3.01 03/16/2021         6. Type 2 diabetes mellitus without complication, without long-term current use of insulin (HCC)  Stable. I advised her regarding diabetic diet, exercise and weight control. Also, I advised her to stay on the current medication, monitor her fingerstick closely. I am going to check the A1c every few months. I will check microalbumin on a yearly basis. I have also advised her to have a yearly eye exam and to monitor her feet closely. Along with instruction of possible complications related to diabetes, even with good control. A1C will be checked  in few months. Lab Results   Component Value Date    LABA1C 6.9 (H) 03/16/2021         7. Irritable bowel syndrome with diarrhea    - dicyclomine (BENTYL) 20 MG tablet;  Take 20 mg by mouth every 6 hours   - GAMMA GT; Future        Medical Decision Making: moderate complexity

## 2021-09-10 ENCOUNTER — HOSPITAL ENCOUNTER (OUTPATIENT)
Facility: HOSPITAL | Age: 76
Discharge: HOME | End: 2021-09-10
Payer: MEDICARE

## 2021-09-10 DIAGNOSIS — E55.9 VITAMIN D DEFICIENCY: ICD-10-CM

## 2021-09-10 DIAGNOSIS — E03.9 ACQUIRED HYPOTHYROIDISM: ICD-10-CM

## 2021-09-10 DIAGNOSIS — K58.0 IRRITABLE BOWEL SYNDROME WITH DIARRHEA: ICD-10-CM

## 2021-09-10 DIAGNOSIS — E11.9 TYPE 2 DIABETES MELLITUS WITHOUT COMPLICATION, WITHOUT LONG-TERM CURRENT USE OF INSULIN (HCC): ICD-10-CM

## 2021-09-10 DIAGNOSIS — E87.1 HYPONATREMIA: ICD-10-CM

## 2021-09-10 LAB
A/G RATIO: 1.9 (ref 0.8–2)
ALBUMIN SERPL-MCNC: 4 G/DL (ref 3.4–4.8)
ALP BLD-CCNC: 103 U/L (ref 25–100)
ALT SERPL-CCNC: 18 U/L (ref 4–36)
ANION GAP SERPL CALCULATED.3IONS-SCNC: 10 MMOL/L (ref 3–16)
AST SERPL-CCNC: 15 U/L (ref 8–33)
BILIRUB SERPL-MCNC: 0.3 MG/DL (ref 0.3–1.2)
BUN BLDV-MCNC: 22 MG/DL (ref 6–20)
CALCIUM SERPL-MCNC: 9.7 MG/DL (ref 8.5–10.5)
CHLORIDE BLD-SCNC: 101 MMOL/L (ref 98–107)
CO2: 26 MMOL/L (ref 20–30)
CREAT SERPL-MCNC: 1.1 MG/DL (ref 0.4–1.2)
GFR AFRICAN AMERICAN: 59
GFR NON-AFRICAN AMERICAN: 48
GLOBULIN: 2.1 G/DL
GLUCOSE BLD-MCNC: 96 MG/DL (ref 74–106)
HBA1C MFR BLD: 6.3 %
HCT VFR BLD CALC: 33.8 % (ref 37–47)
HEMOGLOBIN: 10.5 G/DL (ref 11.5–16.5)
MCH RBC QN AUTO: 29.1 PG (ref 27–32)
MCHC RBC AUTO-ENTMCNC: 31.1 G/DL (ref 31–35)
MCV RBC AUTO: 93.6 FL (ref 80–100)
PDW BLD-RTO: 12.8 % (ref 11–16)
PLATELET # BLD: 270 K/UL (ref 150–400)
PMV BLD AUTO: 10.6 FL (ref 6–10)
POTASSIUM SERPL-SCNC: 4.7 MMOL/L (ref 3.4–5.1)
RBC # BLD: 3.61 M/UL (ref 3.8–5.8)
SODIUM BLD-SCNC: 137 MMOL/L (ref 136–145)
TOTAL PROTEIN: 6.1 G/DL (ref 6.4–8.3)
TSH SERPL DL<=0.05 MIU/L-ACNC: 2.01 UIU/ML (ref 0.27–4.2)
VITAMIN D 25-HYDROXY: 17.5 (ref 32–100)
WBC # BLD: 6.5 K/UL (ref 4–11)

## 2021-09-10 PROCEDURE — 85027 COMPLETE CBC AUTOMATED: CPT

## 2021-09-10 PROCEDURE — 83036 HEMOGLOBIN GLYCOSYLATED A1C: CPT

## 2021-09-10 PROCEDURE — 82306 VITAMIN D 25 HYDROXY: CPT

## 2021-09-10 PROCEDURE — 82977 ASSAY OF GGT: CPT

## 2021-09-10 PROCEDURE — 80053 COMPREHEN METABOLIC PANEL: CPT

## 2021-09-10 PROCEDURE — 84443 ASSAY THYROID STIM HORMONE: CPT

## 2021-09-11 LAB — GAMMA GLUTAMYL TRANSFERASE: 16 U/L (ref 5–36)

## 2021-09-14 ENCOUNTER — TELEPHONE (OUTPATIENT)
Dept: PRIMARY CARE CLINIC | Age: 76
End: 2021-09-14

## 2021-09-14 NOTE — TELEPHONE ENCOUNTER
----- Message from MICKI Mendez sent at 9/12/2021 10:49 PM EDT -----  Her vitamin D is lower than it was recommend that she take 5000 international units daily. She is anemic. This may be related to her recent hospitalization I recommend that we repeat a CBC with iron TIBC and ferritin within the next month. Her A1c is good.

## 2021-10-05 ENCOUNTER — OFFICE VISIT (OUTPATIENT)
Dept: PRIMARY CARE CLINIC | Age: 76
End: 2021-10-05
Payer: MEDICARE

## 2021-10-05 VITALS
SYSTOLIC BLOOD PRESSURE: 130 MMHG | BODY MASS INDEX: 28.82 KG/M2 | HEIGHT: 62 IN | RESPIRATION RATE: 16 BRPM | TEMPERATURE: 97.4 F | OXYGEN SATURATION: 98 % | DIASTOLIC BLOOD PRESSURE: 84 MMHG | WEIGHT: 156.6 LBS | HEART RATE: 62 BPM

## 2021-10-05 DIAGNOSIS — Z23 NEED FOR INFLUENZA VACCINATION: ICD-10-CM

## 2021-10-05 DIAGNOSIS — Z12.31 ENCOUNTER FOR SCREENING MAMMOGRAM FOR BREAST CANCER: ICD-10-CM

## 2021-10-05 DIAGNOSIS — I15.9 SECONDARY HYPERTENSION: ICD-10-CM

## 2021-10-05 DIAGNOSIS — E11.9 TYPE 2 DIABETES MELLITUS WITHOUT COMPLICATION, WITHOUT LONG-TERM CURRENT USE OF INSULIN (HCC): Primary | ICD-10-CM

## 2021-10-05 DIAGNOSIS — I10 ESSENTIAL HYPERTENSION: ICD-10-CM

## 2021-10-05 DIAGNOSIS — E55.9 VITAMIN D DEFICIENCY: ICD-10-CM

## 2021-10-05 DIAGNOSIS — E03.9 ACQUIRED HYPOTHYROIDISM: ICD-10-CM

## 2021-10-05 PROCEDURE — G0008 ADMIN INFLUENZA VIRUS VAC: HCPCS | Performed by: NURSE PRACTITIONER

## 2021-10-05 PROCEDURE — 99214 OFFICE O/P EST MOD 30 MIN: CPT | Performed by: NURSE PRACTITIONER

## 2021-10-05 PROCEDURE — 1090F PRES/ABSN URINE INCON ASSESS: CPT | Performed by: NURSE PRACTITIONER

## 2021-10-05 PROCEDURE — G8417 CALC BMI ABV UP PARAM F/U: HCPCS | Performed by: NURSE PRACTITIONER

## 2021-10-05 PROCEDURE — G8427 DOCREV CUR MEDS BY ELIG CLIN: HCPCS | Performed by: NURSE PRACTITIONER

## 2021-10-05 PROCEDURE — 90694 VACC AIIV4 NO PRSRV 0.5ML IM: CPT | Performed by: NURSE PRACTITIONER

## 2021-10-05 PROCEDURE — 1036F TOBACCO NON-USER: CPT | Performed by: NURSE PRACTITIONER

## 2021-10-05 PROCEDURE — 3017F COLORECTAL CA SCREEN DOC REV: CPT | Performed by: NURSE PRACTITIONER

## 2021-10-05 PROCEDURE — 4040F PNEUMOC VAC/ADMIN/RCVD: CPT | Performed by: NURSE PRACTITIONER

## 2021-10-05 PROCEDURE — 1123F ACP DISCUSS/DSCN MKR DOCD: CPT | Performed by: NURSE PRACTITIONER

## 2021-10-05 PROCEDURE — 3044F HG A1C LEVEL LT 7.0%: CPT | Performed by: NURSE PRACTITIONER

## 2021-10-05 PROCEDURE — 2022F DILAT RTA XM EVC RTNOPTHY: CPT | Performed by: NURSE PRACTITIONER

## 2021-10-05 PROCEDURE — G8399 PT W/DXA RESULTS DOCUMENT: HCPCS | Performed by: NURSE PRACTITIONER

## 2021-10-05 PROCEDURE — G8484 FLU IMMUNIZE NO ADMIN: HCPCS | Performed by: NURSE PRACTITIONER

## 2021-10-05 ASSESSMENT — ENCOUNTER SYMPTOMS
RESPIRATORY NEGATIVE: 1
GASTROINTESTINAL NEGATIVE: 1
EYES NEGATIVE: 1
ALLERGIC/IMMUNOLOGIC NEGATIVE: 1

## 2021-10-05 NOTE — PROGRESS NOTES
Chief Complaint   Patient presents with    [facility]    Diabetes    Hypertension       Have you seen any other physician or provider since your last visit no    Have you had any other diagnostic tests since your last visit? yes - labs    Have you changed or stopped any medications since your last visit? no     Vaccine Information Sheet, \"Influenza - Inactivated\"  given to Laci Sinha, or parent/legal guardian of  Laci Sinha and verbalized understanding. Patient responses:    Have you ever had a reaction to a flu vaccine? No  Do you have any current illness? No  Have you ever had Guillian Hoskins Syndrome? No  Do you have a serious allergy to any of the follow: Neomycin, Polymyxin, Thimerosal, eggs or egg products? No    Flu vaccine given per order. Please see immunization tab. Risks and benefits explained. Current VIS given. Immunizations Administered     Name Date Dose Route    Influenza, Quadv, adjuvanted, 65 yrs +, IM, PF (Fluad) 10/5/2021 0.5 mL Intramuscular    Site: Deltoid- Left    Lot: [id number]    NDC: 84485-581-04               SUBJECTIVE:    Patient Dona Ku is a 78 y.o. female. Chief Complaint   Patient presents with    1 Month Follow-Up    Discuss Labs    Diabetes    Hypertension     HPI:  Patient has had hypertension for several years. She has been compliant with taking the increased Lisinopril, without side effects from it. She has been following a low-sodium, is active and frequently exercises. Weight is fluctuating a bit, compared to last visit. Her blood pressure is stable 130/84 at this time. Patient has had diabetes for the past several years. She has been compliant with the medications and denies any side effects from it. She has been monitoring fingersticks on a daily basis. Her fingerstick range is between . She denies any hypoglycemic symptoms. She has been following a diabetic diet and has been active.   Her last eye exam was less than a year ago. She is using oral meds only. She is taking vit d supplement. Her stomach is doing much better. She is taking Aricept and doing better with her memory. Patient's medications, allergies, past medical, surgical, social and family histories were reviewed and updated as appropriate. Review of Systems   Constitutional: Negative. HENT: Negative. Eyes: Negative. Respiratory: Negative. Cardiovascular: Negative. Gastrointestinal: Negative. Endocrine: Negative. Genitourinary: Negative. Musculoskeletal: Negative. Skin: Negative. Allergic/Immunologic: Negative. Neurological: Negative. Hematological: Negative. Psychiatric/Behavioral: Negative.         OBJECTIVE:  /84 (Site: Left Upper Arm, Position: Sitting, Cuff Size: Medium Adult)   Pulse 62   Temp 97.4 °F (36.3 °C) (Temporal)   Resp 16   Ht 5' 2\" (1.575 m)   Wt 156 lb 9.6 oz (71 kg)   SpO2 98% Comment: room air  BMI 28.64 kg/m²    Physical Exam    Results in Past 30 Days  Result Component Current Result Ref Range Previous Result Ref Range   Albumin/Globulin Ratio 1.9 (9/10/2021) 0.8 - 2.0 Not in Time Range    Albumin 4.0 (9/10/2021) 3.4 - 4.8 g/dL Not in Time Range    Alkaline Phosphatase 103 (H) (9/10/2021) 25 - 100 U/L Not in Time Range    ALT 18 (9/10/2021) 4 - 36 U/L Not in Time Range    AST 15 (9/10/2021) 8 - 33 U/L Not in Time Range    BUN 22 (H) (9/10/2021) 6 - 20 mg/dL Not in Time Range    Calcium 9.7 (9/10/2021) 8.5 - 10.5 mg/dL Not in Time Range    Chloride 101 (9/10/2021) 98 - 107 mmol/L Not in Time Range    CO2 26 (9/10/2021) 20 - 30 mmol/L Not in Time Range    CREATININE 1.1 (9/10/2021) 0.4 - 1.2 mg/dL Not in Time Range    GFR  59 (L) (9/10/2021) >59 Not in Time Range    GFR Non- 48 (L) (9/10/2021) >59 Not in Time Range    Globulin 2.1 (9/10/2021) g/dL Not in Time Range    Glucose 96 (9/10/2021) 74 - 106 mg/dL Not in Time Range Results   Component Value Date    TSH 2.01 09/10/2021       - TSH without Reflex; Future    6. Vitamin D deficiency    - Vitamin B12 & Folate; Future  - Vitamin D 25 Hydroxy; Future    7. Encounter for screening mammogram for breast cancer    - [facility] Digital Screen Bilateral [[id number]];  Future       Written by Alyssa HERZOG, acting as a scribe for [employer] on 10/5/2021 at 12:34 AM.

## 2021-10-12 ENCOUNTER — HOSPITAL ENCOUNTER (OUTPATIENT)
Dept: MAMMOGRAPHY | Facility: HOSPITAL | Age: 76
Discharge: HOME | End: 2021-10-12
Payer: MEDICARE

## 2021-10-12 VITALS — BODY MASS INDEX: 30.36 KG/M2 | WEIGHT: 165 LBS | HEIGHT: 62 IN

## 2021-10-12 DIAGNOSIS — Z12.31 ENCOUNTER FOR SCREENING MAMMOGRAM FOR BREAST CANCER: ICD-10-CM

## 2021-10-12 PROCEDURE — 77063 BREAST TOMOSYNTHESIS BI: CPT

## 2021-11-30 DIAGNOSIS — R41.3 MEMORY LOSS: ICD-10-CM

## 2021-11-30 RX ORDER — DONEPEZIL HYDROCHLORIDE 10 MG/1
TABLET, FILM COATED ORAL
Qty: 90 TABLET | Refills: 1 | Status: SHIPPED | OUTPATIENT
Start: 2021-11-30 | End: 2021-12-01 | Stop reason: SDUPTHER

## 2021-12-01 ENCOUNTER — TELEPHONE (OUTPATIENT)
Dept: PRIMARY CARE CLINIC | Age: 76
End: 2021-12-01

## 2021-12-01 DIAGNOSIS — R41.3 MEMORY LOSS: ICD-10-CM

## 2021-12-01 RX ORDER — NITROGLYCERIN 0.4 MG/1
0.4 TABLET SUBLINGUAL PRN
Qty: 25 TABLET | Refills: 3 | Status: SHIPPED | OUTPATIENT
Start: 2021-12-01 | End: 2022-06-23 | Stop reason: SDUPTHER

## 2021-12-01 RX ORDER — DONEPEZIL HYDROCHLORIDE 10 MG/1
TABLET, FILM COATED ORAL
Qty: 90 TABLET | Refills: 1 | Status: SHIPPED | OUTPATIENT
Start: 2021-12-01 | End: 2022-06-23 | Stop reason: SDUPTHER

## 2021-12-01 NOTE — TELEPHONE ENCOUNTER
----- Message from Anilbarak Chica sent at 12/1/2021  3:13 PM EST -----  Subject: Refill Request    QUESTIONS  Name of Medication? donepezil (ARICEPT) 10 MG tablet  Patient-reported dosage and instructions? TAKE ONE TABLET BY MOUTH EVERY   DAY AT NIGHT  How many days do you have left? 0  Preferred Pharmacy? [facility] phone number (if available)? [phone]  ---------------------------------------------------------------------------  --------------,  Name of Medication? nitroGLYCERIN (NITROSTAT) 0.4 MG SL tablet  Patient-reported dosage and instructions? Place 1 tablet under the tongue   as needed for Chest pain  How many days do you have left? 0  Preferred Pharmacy? [facility] phone number (if available)? [phone]  ---------------------------------------------------------------------------  --------------  CALL BACK INFO  What is the best way for the office to contact you? OK to leave message on   voicemail  Preferred Call Back Phone Number?  [phone]

## 2021-12-21 ENCOUNTER — OFFICE VISIT (OUTPATIENT)
Dept: GASTROENTEROLOGY | Facility: CLINIC | Age: 76
End: 2021-12-21

## 2021-12-21 VITALS
WEIGHT: 159 LBS | BODY MASS INDEX: 29.26 KG/M2 | RESPIRATION RATE: 16 BRPM | TEMPERATURE: 98 F | SYSTOLIC BLOOD PRESSURE: 168 MMHG | HEIGHT: 62 IN | DIASTOLIC BLOOD PRESSURE: 78 MMHG | HEART RATE: 63 BPM

## 2021-12-21 DIAGNOSIS — I10 ESSENTIAL HYPERTENSION: ICD-10-CM

## 2021-12-21 DIAGNOSIS — K21.9 GASTROESOPHAGEAL REFLUX DISEASE WITHOUT ESOPHAGITIS: Chronic | ICD-10-CM

## 2021-12-21 DIAGNOSIS — K76.0 FATTY (CHANGE OF) LIVER, NOT ELSEWHERE CLASSIFIED: Chronic | ICD-10-CM

## 2021-12-21 DIAGNOSIS — R11.0 NAUSEA: ICD-10-CM

## 2021-12-21 DIAGNOSIS — Z86.010 PERSONAL HISTORY OF COLONIC POLYPS: ICD-10-CM

## 2021-12-21 DIAGNOSIS — R74.8 ELEVATED ALKALINE PHOSPHATASE LEVEL: Chronic | ICD-10-CM

## 2021-12-21 DIAGNOSIS — K58.0 IRRITABLE BOWEL SYNDROME WITH DIARRHEA: Primary | Chronic | ICD-10-CM

## 2021-12-21 DIAGNOSIS — Z11.59 ENCOUNTER FOR SCREENING FOR OTHER VIRAL DISEASES: ICD-10-CM

## 2021-12-21 PROCEDURE — 99214 OFFICE O/P EST MOD 30 MIN: CPT | Performed by: NURSE PRACTITIONER

## 2022-01-03 ENCOUNTER — HOSPITAL ENCOUNTER (OUTPATIENT)
Facility: HOSPITAL | Age: 77
Discharge: HOME | End: 2022-01-03
Payer: MEDICARE

## 2022-01-03 DIAGNOSIS — E11.9 TYPE 2 DIABETES MELLITUS WITHOUT COMPLICATION, WITHOUT LONG-TERM CURRENT USE OF INSULIN (HCC): ICD-10-CM

## 2022-01-03 DIAGNOSIS — E55.9 VITAMIN D DEFICIENCY: ICD-10-CM

## 2022-01-03 DIAGNOSIS — I15.9 SECONDARY HYPERTENSION: ICD-10-CM

## 2022-01-03 DIAGNOSIS — E03.9 ACQUIRED HYPOTHYROIDISM: ICD-10-CM

## 2022-01-03 LAB
A/G RATIO: 2.1 (ref 0.8–2)
ALBUMIN SERPL-MCNC: 4.1 G/DL (ref 3.4–4.8)
ALP BLD-CCNC: 154 U/L (ref 25–100)
ALT SERPL-CCNC: 16 U/L (ref 4–36)
ANION GAP SERPL CALCULATED.3IONS-SCNC: 10 MMOL/L (ref 3–16)
AST SERPL-CCNC: 15 U/L (ref 8–33)
BILIRUB SERPL-MCNC: 0.3 MG/DL (ref 0.3–1.2)
BUN BLDV-MCNC: 16 MG/DL (ref 6–20)
CALCIUM SERPL-MCNC: 9.1 MG/DL (ref 8.5–10.5)
CHLORIDE BLD-SCNC: 106 MMOL/L (ref 98–107)
CHOLESTEROL, TOTAL: 141 MG/DL (ref 0–200)
CO2: 27 MMOL/L (ref 20–30)
CREAT SERPL-MCNC: 0.9 MG/DL (ref 0.4–1.2)
FOLATE: 12.61 NG/ML
GFR AFRICAN AMERICAN: >59
GFR NON-AFRICAN AMERICAN: >60
GLOBULIN: 2 G/DL
GLUCOSE BLD-MCNC: 127 MG/DL (ref 74–106)
HBA1C MFR BLD: 6.3 %
HCT VFR BLD CALC: 36.2 % (ref 37–47)
HDLC SERPL-MCNC: 49 MG/DL (ref 40–60)
HEMOGLOBIN: 11.5 G/DL (ref 11.5–16.5)
LDL CHOLESTEROL CALCULATED: 67 MG/DL
MCH RBC QN AUTO: 28.8 PG (ref 27–32)
MCHC RBC AUTO-ENTMCNC: 31.8 G/DL (ref 31–35)
MCV RBC AUTO: 90.5 FL (ref 80–100)
PDW BLD-RTO: 12.3 % (ref 11–16)
PLATELET # BLD: 276 K/UL (ref 150–400)
PMV BLD AUTO: 10.8 FL (ref 6–10)
POTASSIUM SERPL-SCNC: 4.7 MMOL/L (ref 3.4–5.1)
RBC # BLD: 4 M/UL (ref 3.8–5.8)
SODIUM BLD-SCNC: 143 MMOL/L (ref 136–145)
TOTAL PROTEIN: 6.1 G/DL (ref 6.4–8.3)
TRIGL SERPL-MCNC: 124 MG/DL (ref 0–249)
TSH SERPL DL<=0.05 MIU/L-ACNC: 2.92 UIU/ML (ref 0.27–4.2)
VITAMIN B-12: 1024 PG/ML (ref 211–911)
VITAMIN D 25-HYDROXY: 25.5 (ref 32–100)
VLDLC SERPL CALC-MCNC: 25 MG/DL
WBC # BLD: 6.6 K/UL (ref 4–11)

## 2022-01-03 PROCEDURE — 82607 VITAMIN B-12: CPT

## 2022-01-03 PROCEDURE — 85027 COMPLETE CBC AUTOMATED: CPT

## 2022-01-03 PROCEDURE — 83036 HEMOGLOBIN GLYCOSYLATED A1C: CPT

## 2022-01-03 PROCEDURE — 84443 ASSAY THYROID STIM HORMONE: CPT

## 2022-01-03 PROCEDURE — 80061 LIPID PANEL: CPT

## 2022-01-03 PROCEDURE — 82306 VITAMIN D 25 HYDROXY: CPT

## 2022-01-03 PROCEDURE — 80053 COMPREHEN METABOLIC PANEL: CPT

## 2022-01-03 PROCEDURE — 82746 ASSAY OF FOLIC ACID SERUM: CPT

## 2022-01-05 ENCOUNTER — OFFICE VISIT (OUTPATIENT)
Dept: PRIMARY CARE CLINIC | Age: 77
End: 2022-01-05
Payer: MEDICARE

## 2022-01-05 VITALS
HEIGHT: 62 IN | OXYGEN SATURATION: 96 % | TEMPERATURE: 97.4 F | DIASTOLIC BLOOD PRESSURE: 75 MMHG | SYSTOLIC BLOOD PRESSURE: 175 MMHG | RESPIRATION RATE: 16 BRPM | WEIGHT: 159 LBS | HEART RATE: 66 BPM | BODY MASS INDEX: 29.26 KG/M2

## 2022-01-05 DIAGNOSIS — I15.9 SECONDARY HYPERTENSION: Primary | ICD-10-CM

## 2022-01-05 DIAGNOSIS — E03.9 ACQUIRED HYPOTHYROIDISM: ICD-10-CM

## 2022-01-05 DIAGNOSIS — R41.3 MEMORY LOSS: ICD-10-CM

## 2022-01-05 DIAGNOSIS — E11.9 TYPE 2 DIABETES MELLITUS WITHOUT COMPLICATION, WITHOUT LONG-TERM CURRENT USE OF INSULIN (HCC): ICD-10-CM

## 2022-01-05 PROCEDURE — G8399 PT W/DXA RESULTS DOCUMENT: HCPCS | Performed by: NURSE PRACTITIONER

## 2022-01-05 PROCEDURE — 99214 OFFICE O/P EST MOD 30 MIN: CPT | Performed by: NURSE PRACTITIONER

## 2022-01-05 PROCEDURE — 3044F HG A1C LEVEL LT 7.0%: CPT | Performed by: NURSE PRACTITIONER

## 2022-01-05 PROCEDURE — 3017F COLORECTAL CA SCREEN DOC REV: CPT | Performed by: NURSE PRACTITIONER

## 2022-01-05 PROCEDURE — 1036F TOBACCO NON-USER: CPT | Performed by: NURSE PRACTITIONER

## 2022-01-05 PROCEDURE — G8484 FLU IMMUNIZE NO ADMIN: HCPCS | Performed by: NURSE PRACTITIONER

## 2022-01-05 PROCEDURE — 4040F PNEUMOC VAC/ADMIN/RCVD: CPT | Performed by: NURSE PRACTITIONER

## 2022-01-05 PROCEDURE — G8427 DOCREV CUR MEDS BY ELIG CLIN: HCPCS | Performed by: NURSE PRACTITIONER

## 2022-01-05 PROCEDURE — G8417 CALC BMI ABV UP PARAM F/U: HCPCS | Performed by: NURSE PRACTITIONER

## 2022-01-05 PROCEDURE — 2022F DILAT RTA XM EVC RTNOPTHY: CPT | Performed by: NURSE PRACTITIONER

## 2022-01-05 PROCEDURE — 1090F PRES/ABSN URINE INCON ASSESS: CPT | Performed by: NURSE PRACTITIONER

## 2022-01-05 PROCEDURE — 1123F ACP DISCUSS/DSCN MKR DOCD: CPT | Performed by: NURSE PRACTITIONER

## 2022-01-05 RX ORDER — AMLODIPINE BESYLATE 5 MG/1
5 TABLET ORAL DAILY
Qty: 30 TABLET | Refills: 3 | Status: SHIPPED | OUTPATIENT
Start: 2022-01-05 | End: 2022-05-12 | Stop reason: SDUPTHER

## 2022-01-05 RX ORDER — LISINOPRIL 40 MG/1
40 TABLET ORAL DAILY
Qty: 90 TABLET | Refills: 1 | Status: SHIPPED | OUTPATIENT
Start: 2022-01-05 | End: 2022-06-23 | Stop reason: SDUPTHER

## 2022-01-05 ASSESSMENT — PATIENT HEALTH QUESTIONNAIRE - PHQ9
1. LITTLE INTEREST OR PLEASURE IN DOING THINGS: 1
3. TROUBLE FALLING OR STAYING ASLEEP: 0
2. FEELING DOWN, DEPRESSED OR HOPELESS: 1
7. TROUBLE CONCENTRATING ON THINGS, SUCH AS READING THE NEWSPAPER OR WATCHING TELEVISION: 1
SUM OF ALL RESPONSES TO PHQ9 QUESTIONS 1 & 2: 2
6. FEELING BAD ABOUT YOURSELF - OR THAT YOU ARE A FAILURE OR HAVE LET YOURSELF OR YOUR FAMILY DOWN: 0
5. POOR APPETITE OR OVEREATING: 0
SUM OF ALL RESPONSES TO PHQ QUESTIONS 1-9: 4
10. IF YOU CHECKED OFF ANY PROBLEMS, HOW DIFFICULT HAVE THESE PROBLEMS MADE IT FOR YOU TO DO YOUR WORK, TAKE CARE OF THINGS AT HOME, OR GET ALONG WITH OTHER PEOPLE: 1
8. MOVING OR SPEAKING SO SLOWLY THAT OTHER PEOPLE COULD HAVE NOTICED. OR THE OPPOSITE, BEING SO FIGETY OR RESTLESS THAT YOU HAVE BEEN MOVING AROUND A LOT MORE THAN USUAL: 0
SUM OF ALL RESPONSES TO PHQ QUESTIONS 1-9: 4
9. THOUGHTS THAT YOU WOULD BE BETTER OFF DEAD, OR OF HURTING YOURSELF: 0
SUM OF ALL RESPONSES TO PHQ QUESTIONS 1-9: 4
SUM OF ALL RESPONSES TO PHQ QUESTIONS 1-9: 4
4. FEELING TIRED OR HAVING LITTLE ENERGY: 1

## 2022-01-05 NOTE — PROGRESS NOTES
Chief Complaint   Patient presents with    Follow-up    Diabetes    Hypertension       Have you seen any other physician or provider since your last visit yes eye doctor, gastrointerologist    Have you had any other diagnostic tests since your last visit? yes - labs,mammogram    Have you changed or stopped any medications since your last visit? no        SUBJECTIVE:    Patient Jerri Hernandez is a 78 y.o. female. Chief Complaint   Patient presents with    Follow-up    Diabetes    Hypertension     HPI:  Patient has had hypertension for few several years. She has been compliant with taking medications, without side effects from it. She has been following a low-sodium, is active and rarely exercises. Weight is stable, compared to last visit. Her blood pressure is elevated 160/82 at this time. Patient has had diabetes for the past several years. She has been compliant with the medications and denies any side effects from it. She has been monitoring fingersticks on a daily basis. Her fingerstick range is between 120-140. She denies any hypoglycemic symptoms. She has been following a diabetic diet and has been active. Her last eye exam was less than a year ago. She is using oral meds only. She is taking Aricept 10 mg and tolerating well. She says she knows it is helping a lot and she denies side effects. Patient's medications, allergies, past medical, surgical, social and family histories were reviewed and updated as appropriate. Review of Systems   Constitutional: Negative. HENT: Negative. Eyes: Negative. Respiratory: Negative. Cardiovascular: Negative. Gastrointestinal: Negative. Endocrine: Negative. Genitourinary: Negative. Musculoskeletal: Negative. Skin: Negative. Allergic/Immunologic: Negative. Neurological: Negative. Hematological: Negative. Psychiatric/Behavioral: Negative.         OBJECTIVE:  BP (!) 175/75 (Site: Left Upper Arm, Position: Sitting, Cuff Size: Medium Adult)   Pulse 66   Temp 97.4 °F (36.3 °C) (Temporal)   Resp 16   Ht 5' 2\" (1.575 m)   Wt 159 lb (72.1 kg)   SpO2 96% Comment: room air  BMI 29.08 kg/m²    Physical Exam    Results in Past 30 Days  Result Component Current Result Ref Range Previous Result Ref Range   Albumin/Globulin Ratio 2.1 (H) (1/3/2022) 0.8 - 2.0 Not in Time Range    Albumin 4.1 (1/3/2022) 3.4 - 4.8 g/dL Not in Time Range    Alkaline Phosphatase 154 (H) (1/3/2022) 25 - 100 U/L Not in Time Range    ALT 16 (1/3/2022) 4 - 36 U/L Not in Time Range    AST 15 (1/3/2022) 8 - 33 U/L Not in Time Range    BUN 16 (1/3/2022) 6 - 20 mg/dL Not in Time Range    Calcium 9.1 (1/3/2022) 8.5 - 10.5 mg/dL Not in Time Range    Chloride 106 (1/3/2022) 98 - 107 mmol/L Not in Time Range    CO2 27 (1/3/2022) 20 - 30 mmol/L Not in Time Range    CREATININE 0.9 (1/3/2022) 0.4 - 1.2 mg/dL Not in Time Range    GFR  >59 (1/3/2022) >59 Not in Time Range    GFR Non- >60 (1/3/2022) >59 Not in Time Range    Globulin 2.0 (1/3/2022) Not Established g/dL Not in Time Range    Glucose 127 (H) (1/3/2022) 74 - 106 mg/dL Not in Time Range    Potassium 4.7 (1/3/2022) 3.4 - 5.1 mmol/L Not in Time Range    Sodium 143 (1/3/2022) 136 - 145 mmol/L Not in Time Range    Total Bilirubin 0.3 (1/3/2022) 0.3 - 1.2 mg/dL Not in Time Range    Total Protein 6.1 (L) (1/3/2022) 6.4 - 8.3 g/dL Not in Time Range        Hemoglobin A1C (%)   Date Value   01/03/2022 6.3 (H)     Microalbumin, Random Urine (mg/dL)   Date Value   04/01/2019 <1.20     LDL Calculated (mg/dL)   Date Value   01/03/2022 67         Lab Results   Component Value Date    WBC 6.6 01/03/2022    NEUTROABS 3.4 04/07/2015    HGB 11.5 01/03/2022    HCT 36.2 01/03/2022    MCV 90.5 01/03/2022     01/03/2022       Lab Results   Component Value Date    TSH 2.92 01/03/2022         ASSESSMENT/PLAN:     1. Secondary hypertension  BP is stable.  I have advised her on

## 2022-02-23 ENCOUNTER — OFFICE VISIT (OUTPATIENT)
Dept: PRIMARY CARE CLINIC | Age: 77
End: 2022-02-23
Payer: MEDICARE

## 2022-02-23 VITALS
WEIGHT: 156 LBS | BODY MASS INDEX: 28.71 KG/M2 | HEART RATE: 63 BPM | TEMPERATURE: 96.7 F | RESPIRATION RATE: 16 BRPM | HEIGHT: 62 IN | OXYGEN SATURATION: 98 % | DIASTOLIC BLOOD PRESSURE: 80 MMHG | SYSTOLIC BLOOD PRESSURE: 122 MMHG

## 2022-02-23 DIAGNOSIS — I15.9 SECONDARY HYPERTENSION: ICD-10-CM

## 2022-02-23 DIAGNOSIS — E55.9 VITAMIN D DEFICIENCY: ICD-10-CM

## 2022-02-23 DIAGNOSIS — E11.9 TYPE 2 DIABETES MELLITUS WITHOUT COMPLICATION, WITHOUT LONG-TERM CURRENT USE OF INSULIN (HCC): Primary | ICD-10-CM

## 2022-02-23 DIAGNOSIS — E03.9 ACQUIRED HYPOTHYROIDISM: ICD-10-CM

## 2022-02-23 PROCEDURE — 1036F TOBACCO NON-USER: CPT | Performed by: NURSE PRACTITIONER

## 2022-02-23 PROCEDURE — 99214 OFFICE O/P EST MOD 30 MIN: CPT | Performed by: NURSE PRACTITIONER

## 2022-02-23 PROCEDURE — G8427 DOCREV CUR MEDS BY ELIG CLIN: HCPCS | Performed by: NURSE PRACTITIONER

## 2022-02-23 PROCEDURE — 3017F COLORECTAL CA SCREEN DOC REV: CPT | Performed by: NURSE PRACTITIONER

## 2022-02-23 PROCEDURE — 1123F ACP DISCUSS/DSCN MKR DOCD: CPT | Performed by: NURSE PRACTITIONER

## 2022-02-23 PROCEDURE — 2022F DILAT RTA XM EVC RTNOPTHY: CPT | Performed by: NURSE PRACTITIONER

## 2022-02-23 PROCEDURE — G8417 CALC BMI ABV UP PARAM F/U: HCPCS | Performed by: NURSE PRACTITIONER

## 2022-02-23 PROCEDURE — G8399 PT W/DXA RESULTS DOCUMENT: HCPCS | Performed by: NURSE PRACTITIONER

## 2022-02-23 PROCEDURE — G8484 FLU IMMUNIZE NO ADMIN: HCPCS | Performed by: NURSE PRACTITIONER

## 2022-02-23 PROCEDURE — 4040F PNEUMOC VAC/ADMIN/RCVD: CPT | Performed by: NURSE PRACTITIONER

## 2022-02-23 PROCEDURE — 1090F PRES/ABSN URINE INCON ASSESS: CPT | Performed by: NURSE PRACTITIONER

## 2022-02-23 PROCEDURE — 3044F HG A1C LEVEL LT 7.0%: CPT | Performed by: NURSE PRACTITIONER

## 2022-02-23 ASSESSMENT — ENCOUNTER SYMPTOMS
RESPIRATORY NEGATIVE: 1
ALLERGIC/IMMUNOLOGIC NEGATIVE: 1
GASTROINTESTINAL NEGATIVE: 1
EYES NEGATIVE: 1

## 2022-02-23 NOTE — PROGRESS NOTES
Chief Complaint   Patient presents with    Follow-up    Diabetes    Hypertension       Have you seen any other physician or provider since your last visit yes eye doctor     Have you had any other diagnostic tests since your last visit? no    Have you changed or stopped any medications since your last visit? no     I have recommended that this patient have a sigmoidoscopy but she declines at this time. I have discussed the risks and benefits of this examination with her. The patient verbalizes understanding. Diabetic retinal exam completed this year? Yes                       * If yes please have patient sign a records release to obtain record to update Health Maintenance                       * If no, please order referral for patient to be scheduled         SUBJECTIVE:    Patient Kandis Figueroa is a 78 y.o. female. Chief Complaint   Patient presents with    Follow-up    Diabetes    Hypertension     HPI:  Patient has had hypertension for several years. She has been compliant with taking medications, without side effects from it. She has been following a low-sodium, is active and rarely exercises. Weight is stable, compared to last visit. Her blood pressure is elevated 160/82 at this time. Patient has had diabetes for the past several years. She has been compliant with the medications and denies any side effects from it. She has been monitoring fingersticks on a daily basis. Her fingerstick range is between 110-200. She denies any hypoglycemic symptoms. She has been following a diabetic diet and has been active. Her last eye exam was less than a year ago. She is using oral meds only. She recently had a birthday and has been eating cake. She is concerned about elevated sugars. Patient's medications, allergies, past medical, surgical, social and family histories were reviewed and updated as appropriate. Review of Systems   Constitutional: Negative. HENT: Negative.     Eyes: Negative. Respiratory: Negative. Cardiovascular: Negative. Gastrointestinal: Negative. Endocrine: Negative. Genitourinary: Negative. Musculoskeletal: Negative. Skin: Negative. Allergic/Immunologic: Negative. Neurological: Negative. Hematological: Negative. Psychiatric/Behavioral: Negative. OBJECTIVE:  /80 (Site: Right Upper Arm, Position: Sitting, Cuff Size: Medium Adult)   Pulse 63   Temp 96.7 °F (35.9 °C) (Temporal)   Resp 16   Ht 5' 2\" (1.575 m)   Wt 156 lb (70.8 kg)   SpO2 98% Comment: room air  BMI 28.53 kg/m²    Physical Exam  Vitals and nursing note reviewed. Constitutional:       Appearance: She is well-developed. HENT:      Head: Normocephalic and atraumatic. Eyes:      Conjunctiva/sclera: Conjunctivae normal.      Pupils: Pupils are equal, round, and reactive to light. Neck:      Thyroid: No thyromegaly. Vascular: No JVD. Cardiovascular:      Rate and Rhythm: Normal rate and regular rhythm. Heart sounds: No murmur heard. No friction rub. No gallop. Pulmonary:      Effort: Pulmonary effort is normal. No respiratory distress. Breath sounds: Normal breath sounds. No wheezing or rales. Abdominal:      General: Bowel sounds are normal. There is no distension. Palpations: Abdomen is soft. Tenderness: There is no abdominal tenderness. There is no guarding. Musculoskeletal:         General: No tenderness. Cervical back: Normal range of motion and neck supple. Skin:     General: Skin is warm and dry. Findings: No rash. Neurological:      Mental Status: She is alert and oriented to person, place, and time. Psychiatric:         Judgment: Judgment normal.         No results found for requested labs within last 30 days.        Hemoglobin A1C (%)   Date Value   01/03/2022 6.3 (H)     Microalbumin, Random Urine (mg/dL)   Date Value   04/01/2019 <1.20     LDL Calculated (mg/dL)   Date Value   01/03/2022 67 Lab Results   Component Value Date    WBC 6.6 01/03/2022    NEUTROABS 3.4 04/07/2015    HGB 11.5 01/03/2022    HCT 36.2 01/03/2022    MCV 90.5 01/03/2022     01/03/2022       Lab Results   Component Value Date    TSH 2.92 01/03/2022         ASSESSMENT/PLAN:     1. Secondary hypertension  Stable on Norvasc, Lisinopril and atenolol    2. Acquired hypothyroidism     Stable . I am going to check the TSH every few months. I am going to continue the current dose of Levothyroxine. I have advised her on the importance of taking the medication on a daily basis. Lab Results   Component Value Date    TSH 2.92 01/03/2022       - TSH; Future    3. Type 2 diabetes mellitus without complication, without long-term current use of insulin (HCC)  Stable. I advised her regarding diabetic diet, exercise and weight control. Also, I advised her to stay on the current medication, monitor her fingerstick closely. I am going to check the A1c every few months. I will check microalbumin on a yearly basis. I have also advised her to have a yearly eye exam and to monitor her feet closely. Along with instruction of possible complications related to diabetes, even with good control. A1C will be checked  in few months. Lab Results   Component Value Date    LABA1C 6.3 (H) 01/03/2022       - Hemoglobin A1C; Future  - Comprehensive Metabolic Panel; Future  - CBC; Future  - Microalbumin / Creatinine Urine Ratio; Future    4. Vitamin D deficiency  stavle continue vit d supplement.         Written by Shellie HERZOG, acting as a scribe for Matilda Pollack on 2/23/2022 at 12:15 AM. no

## 2022-03-10 DIAGNOSIS — E78.00 PURE HYPERCHOLESTEROLEMIA: ICD-10-CM

## 2022-03-10 RX ORDER — ATENOLOL 50 MG/1
TABLET ORAL
Qty: 90 TABLET | Refills: 1 | Status: SHIPPED | OUTPATIENT
Start: 2022-03-10 | End: 2022-06-23 | Stop reason: SDUPTHER

## 2022-04-11 DIAGNOSIS — E03.9 ACQUIRED HYPOTHYROIDISM: ICD-10-CM

## 2022-04-11 RX ORDER — LEVOTHYROXINE SODIUM 0.03 MG/1
TABLET ORAL
Qty: 90 TABLET | Refills: 3 | Status: SHIPPED | OUTPATIENT
Start: 2022-04-11 | End: 2022-06-23 | Stop reason: SDUPTHER

## 2022-04-11 RX ORDER — MONTELUKAST SODIUM 10 MG/1
TABLET ORAL
Qty: 90 TABLET | Refills: 3 | Status: SHIPPED | OUTPATIENT
Start: 2022-04-11 | End: 2022-06-23 | Stop reason: SDUPTHER

## 2022-05-12 DIAGNOSIS — I15.9 SECONDARY HYPERTENSION: ICD-10-CM

## 2022-05-12 RX ORDER — AMLODIPINE BESYLATE 5 MG/1
5 TABLET ORAL DAILY
Qty: 30 TABLET | Refills: 3 | Status: SHIPPED | OUTPATIENT
Start: 2022-05-12 | End: 2022-06-23 | Stop reason: SDUPTHER

## 2022-06-21 ENCOUNTER — OFFICE VISIT (OUTPATIENT)
Dept: GASTROENTEROLOGY | Facility: CLINIC | Age: 77
End: 2022-06-21

## 2022-06-21 VITALS
OXYGEN SATURATION: 97 % | BODY MASS INDEX: 29.11 KG/M2 | HEIGHT: 62 IN | SYSTOLIC BLOOD PRESSURE: 134 MMHG | TEMPERATURE: 98.6 F | WEIGHT: 158.2 LBS | DIASTOLIC BLOOD PRESSURE: 76 MMHG | HEART RATE: 89 BPM

## 2022-06-21 DIAGNOSIS — K76.0 FATTY (CHANGE OF) LIVER, NOT ELSEWHERE CLASSIFIED: ICD-10-CM

## 2022-06-21 DIAGNOSIS — R74.8 ELEVATED ALKALINE PHOSPHATASE LEVEL: ICD-10-CM

## 2022-06-21 DIAGNOSIS — R11.0 NAUSEA: ICD-10-CM

## 2022-06-21 DIAGNOSIS — Z11.59 ENCOUNTER FOR SCREENING FOR OTHER VIRAL DISEASES: ICD-10-CM

## 2022-06-21 DIAGNOSIS — K21.9 GASTROESOPHAGEAL REFLUX DISEASE WITHOUT ESOPHAGITIS: ICD-10-CM

## 2022-06-21 DIAGNOSIS — Z86.010 PERSONAL HISTORY OF COLONIC POLYPS: ICD-10-CM

## 2022-06-21 DIAGNOSIS — I10 ESSENTIAL HYPERTENSION: ICD-10-CM

## 2022-06-21 DIAGNOSIS — E66.3 OVERWEIGHT WITH BODY MASS INDEX (BMI) OF 28 TO 28.9 IN ADULT: ICD-10-CM

## 2022-06-21 DIAGNOSIS — K58.0 IRRITABLE BOWEL SYNDROME WITH DIARRHEA: Primary | ICD-10-CM

## 2022-06-21 PROCEDURE — 99214 OFFICE O/P EST MOD 30 MIN: CPT | Performed by: NURSE PRACTITIONER

## 2022-06-21 RX ORDER — AMLODIPINE BESYLATE 5 MG/1
5 TABLET ORAL DAILY
COMMUNITY
End: 2023-01-12 | Stop reason: ALTCHOICE

## 2022-06-23 ENCOUNTER — OFFICE VISIT (OUTPATIENT)
Dept: PRIMARY CARE CLINIC | Age: 77
End: 2022-06-23
Payer: MEDICARE

## 2022-06-23 ENCOUNTER — HOSPITAL ENCOUNTER (OUTPATIENT)
Facility: HOSPITAL | Age: 77
Discharge: HOME | End: 2022-06-23
Payer: MEDICARE

## 2022-06-23 VITALS
HEIGHT: 62 IN | WEIGHT: 154.6 LBS | TEMPERATURE: 97.1 F | BODY MASS INDEX: 28.45 KG/M2 | SYSTOLIC BLOOD PRESSURE: 128 MMHG | RESPIRATION RATE: 14 BRPM | OXYGEN SATURATION: 95 % | DIASTOLIC BLOOD PRESSURE: 64 MMHG | HEART RATE: 60 BPM

## 2022-06-23 DIAGNOSIS — K58.0 IRRITABLE BOWEL SYNDROME WITH DIARRHEA: ICD-10-CM

## 2022-06-23 DIAGNOSIS — F33.42 RECURRENT MAJOR DEPRESSIVE DISORDER, IN FULL REMISSION (HCC): ICD-10-CM

## 2022-06-23 DIAGNOSIS — J30.9 ALLERGIC RHINITIS, UNSPECIFIED SEASONALITY, UNSPECIFIED TRIGGER: ICD-10-CM

## 2022-06-23 DIAGNOSIS — F33.1 MAJOR DEPRESSIVE DISORDER, RECURRENT, MODERATE (HCC): ICD-10-CM

## 2022-06-23 DIAGNOSIS — E03.9 ACQUIRED HYPOTHYROIDISM: ICD-10-CM

## 2022-06-23 DIAGNOSIS — F33.0 MAJOR DEPRESSIVE DISORDER, RECURRENT, MILD (HCC): ICD-10-CM

## 2022-06-23 DIAGNOSIS — E78.00 PURE HYPERCHOLESTEROLEMIA: ICD-10-CM

## 2022-06-23 DIAGNOSIS — E11.9 TYPE 2 DIABETES MELLITUS WITHOUT COMPLICATION, WITHOUT LONG-TERM CURRENT USE OF INSULIN (HCC): ICD-10-CM

## 2022-06-23 DIAGNOSIS — F32.A DEPRESSION, UNSPECIFIED DEPRESSION TYPE: ICD-10-CM

## 2022-06-23 DIAGNOSIS — I10 ESSENTIAL HYPERTENSION: ICD-10-CM

## 2022-06-23 DIAGNOSIS — R41.3 MEMORY LOSS: ICD-10-CM

## 2022-06-23 DIAGNOSIS — M81.0 OSTEOPOROSIS, UNSPECIFIED OSTEOPOROSIS TYPE, UNSPECIFIED PATHOLOGICAL FRACTURE PRESENCE: ICD-10-CM

## 2022-06-23 DIAGNOSIS — E55.9 VITAMIN D DEFICIENCY: ICD-10-CM

## 2022-06-23 DIAGNOSIS — R07.89 ATYPICAL CHEST PAIN: Primary | ICD-10-CM

## 2022-06-23 DIAGNOSIS — I15.9 SECONDARY HYPERTENSION: ICD-10-CM

## 2022-06-23 DIAGNOSIS — K21.9 GASTROESOPHAGEAL REFLUX DISEASE, UNSPECIFIED WHETHER ESOPHAGITIS PRESENT: ICD-10-CM

## 2022-06-23 PROBLEM — F33.9 MAJOR DEPRESSIVE DISORDER, RECURRENT, UNSPECIFIED (HCC): Status: ACTIVE | Noted: 2022-06-23

## 2022-06-23 LAB
A/G RATIO: 1.8 (ref 0.8–2)
ALBUMIN SERPL-MCNC: 4.2 G/DL (ref 3.4–4.8)
ALP BLD-CCNC: 143 U/L (ref 25–100)
ALT SERPL-CCNC: 20 U/L (ref 4–36)
ANION GAP SERPL CALCULATED.3IONS-SCNC: 9 MMOL/L (ref 3–16)
AST SERPL-CCNC: 19 U/L (ref 8–33)
BILIRUB SERPL-MCNC: 0.3 MG/DL (ref 0.3–1.2)
BUN BLDV-MCNC: 20 MG/DL (ref 6–20)
CALCIUM SERPL-MCNC: 9 MG/DL (ref 8.5–10.5)
CHLORIDE BLD-SCNC: 105 MMOL/L (ref 98–107)
CO2: 27 MMOL/L (ref 20–30)
CREAT SERPL-MCNC: 1 MG/DL (ref 0.4–1.2)
CREATININE URINE: 157.5 MG/DL (ref 1.5–300)
GFR AFRICAN AMERICAN: >59
GFR NON-AFRICAN AMERICAN: 54
GLOBULIN: 2.3 G/DL
GLUCOSE BLD-MCNC: 120 MG/DL (ref 74–106)
HBA1C MFR BLD: 6.6 %
HCT VFR BLD CALC: 36.3 % (ref 37–47)
HEMOGLOBIN: 11.3 G/DL (ref 11.5–16.5)
MCH RBC QN AUTO: 28.4 PG (ref 27–32)
MCHC RBC AUTO-ENTMCNC: 31.1 G/DL (ref 31–35)
MCV RBC AUTO: 91.2 FL (ref 80–100)
MICROALBUMIN UR-MCNC: 6.2 MG/DL (ref 0–22)
MICROALBUMIN/CREAT UR-RTO: 39.4 MG/G (ref 0–30)
PDW BLD-RTO: 13.3 % (ref 11–16)
PLATELET # BLD: 244 K/UL (ref 150–400)
PMV BLD AUTO: 11.8 FL (ref 6–10)
POTASSIUM SERPL-SCNC: 4.8 MMOL/L (ref 3.4–5.1)
RBC # BLD: 3.98 M/UL (ref 3.8–5.8)
SODIUM BLD-SCNC: 141 MMOL/L (ref 136–145)
TOTAL PROTEIN: 6.5 G/DL (ref 6.4–8.3)
TSH SERPL DL<=0.05 MIU/L-ACNC: 2.87 UIU/ML (ref 0.27–4.2)
WBC # BLD: 7.4 K/UL (ref 4–11)

## 2022-06-23 PROCEDURE — 80053 COMPREHEN METABOLIC PANEL: CPT

## 2022-06-23 PROCEDURE — 85027 COMPLETE CBC AUTOMATED: CPT

## 2022-06-23 PROCEDURE — 84443 ASSAY THYROID STIM HORMONE: CPT

## 2022-06-23 PROCEDURE — G8417 CALC BMI ABV UP PARAM F/U: HCPCS | Performed by: NURSE PRACTITIONER

## 2022-06-23 PROCEDURE — 82570 ASSAY OF URINE CREATININE: CPT

## 2022-06-23 PROCEDURE — 2022F DILAT RTA XM EVC RTNOPTHY: CPT | Performed by: NURSE PRACTITIONER

## 2022-06-23 PROCEDURE — 3044F HG A1C LEVEL LT 7.0%: CPT | Performed by: NURSE PRACTITIONER

## 2022-06-23 PROCEDURE — 1036F TOBACCO NON-USER: CPT | Performed by: NURSE PRACTITIONER

## 2022-06-23 PROCEDURE — 1090F PRES/ABSN URINE INCON ASSESS: CPT | Performed by: NURSE PRACTITIONER

## 2022-06-23 PROCEDURE — 82043 UR ALBUMIN QUANTITATIVE: CPT

## 2022-06-23 PROCEDURE — 1123F ACP DISCUSS/DSCN MKR DOCD: CPT | Performed by: NURSE PRACTITIONER

## 2022-06-23 PROCEDURE — G8427 DOCREV CUR MEDS BY ELIG CLIN: HCPCS | Performed by: NURSE PRACTITIONER

## 2022-06-23 PROCEDURE — 83036 HEMOGLOBIN GLYCOSYLATED A1C: CPT

## 2022-06-23 PROCEDURE — 99214 OFFICE O/P EST MOD 30 MIN: CPT | Performed by: NURSE PRACTITIONER

## 2022-06-23 PROCEDURE — G8399 PT W/DXA RESULTS DOCUMENT: HCPCS | Performed by: NURSE PRACTITIONER

## 2022-06-23 PROCEDURE — 3017F COLORECTAL CA SCREEN DOC REV: CPT | Performed by: NURSE PRACTITIONER

## 2022-06-23 RX ORDER — LANCETS
EACH MISCELLANEOUS
Qty: 100 EACH | Refills: 3 | Status: SHIPPED | OUTPATIENT
Start: 2022-06-23

## 2022-06-23 RX ORDER — ERGOCALCIFEROL (VITAMIN D2) 1250 MCG
50000 CAPSULE ORAL WEEKLY
Qty: 12 CAPSULE | Refills: 3 | Status: SHIPPED | OUTPATIENT
Start: 2022-06-23 | End: 2022-10-19 | Stop reason: SDUPTHER

## 2022-06-23 RX ORDER — DONEPEZIL HYDROCHLORIDE 10 MG/1
TABLET, FILM COATED ORAL
Qty: 90 TABLET | Refills: 1 | Status: SHIPPED | OUTPATIENT
Start: 2022-06-23 | End: 2022-10-19 | Stop reason: SDUPTHER

## 2022-06-23 RX ORDER — AMLODIPINE BESYLATE 5 MG/1
5 TABLET ORAL DAILY
Qty: 30 TABLET | Refills: 3 | Status: SHIPPED | OUTPATIENT
Start: 2022-06-23 | End: 2022-10-19 | Stop reason: ALTCHOICE

## 2022-06-23 RX ORDER — ATENOLOL 50 MG/1
TABLET ORAL
Qty: 90 TABLET | Refills: 1 | Status: SHIPPED | OUTPATIENT
Start: 2022-06-23 | End: 2022-10-19 | Stop reason: ALTCHOICE

## 2022-06-23 RX ORDER — MONTELUKAST SODIUM 10 MG/1
TABLET ORAL
Qty: 90 TABLET | Refills: 3 | Status: SHIPPED | OUTPATIENT
Start: 2022-06-23 | End: 2022-10-19 | Stop reason: SDUPTHER

## 2022-06-23 RX ORDER — FEXOFENADINE HCL 180 MG/1
180 TABLET ORAL DAILY
Qty: 90 TABLET | Refills: 3 | Status: SHIPPED | OUTPATIENT
Start: 2022-06-23 | End: 2022-10-19 | Stop reason: SDUPTHER

## 2022-06-23 RX ORDER — ASPIRIN 81 MG/1
81 TABLET ORAL DAILY
Qty: 90 TABLET | Refills: 3 | Status: SHIPPED | OUTPATIENT
Start: 2022-06-23

## 2022-06-23 RX ORDER — TRIAMCINOLONE ACETONIDE 1 MG/G
CREAM TOPICAL
COMMUNITY
Start: 2022-05-17 | End: 2022-10-19 | Stop reason: ALTCHOICE

## 2022-06-23 RX ORDER — SUCRALFATE 1 G/1
1 TABLET ORAL 4 TIMES DAILY
Qty: 120 TABLET | Refills: 2 | Status: SHIPPED | OUTPATIENT
Start: 2022-06-23 | End: 2022-10-19 | Stop reason: ALTCHOICE

## 2022-06-23 RX ORDER — ATORVASTATIN CALCIUM 20 MG/1
20 TABLET, FILM COATED ORAL DAILY
Qty: 90 TABLET | Refills: 3 | Status: SHIPPED | OUTPATIENT
Start: 2022-06-23 | End: 2022-10-19 | Stop reason: SDUPTHER

## 2022-06-23 RX ORDER — ALENDRONATE SODIUM 70 MG/1
TABLET ORAL
Qty: 12 TABLET | Refills: 3 | Status: SHIPPED | OUTPATIENT
Start: 2022-06-23 | End: 2022-10-19 | Stop reason: SDUPTHER

## 2022-06-23 RX ORDER — DICYCLOMINE HCL 20 MG
20 TABLET ORAL EVERY 6 HOURS
Qty: 120 TABLET | Refills: 2 | Status: SHIPPED | OUTPATIENT
Start: 2022-06-23 | End: 2022-10-19 | Stop reason: SINTOL

## 2022-06-23 RX ORDER — FLUTICASONE PROPIONATE 50 MCG
SPRAY, SUSPENSION (ML) NASAL
Qty: 16 G | Refills: 3 | Status: SHIPPED | OUTPATIENT
Start: 2022-06-23 | End: 2022-10-19 | Stop reason: SDUPTHER

## 2022-06-23 RX ORDER — NITROGLYCERIN 80 MG/1
1 PATCH TRANSDERMAL DAILY
Qty: 30 PATCH | Refills: 3 | Status: SHIPPED | OUTPATIENT
Start: 2022-06-23 | End: 2022-08-25

## 2022-06-23 RX ORDER — ESOMEPRAZOLE MAGNESIUM 40 MG/1
CAPSULE, DELAYED RELEASE ORAL
Qty: 90 CAPSULE | Refills: 3 | Status: SHIPPED | OUTPATIENT
Start: 2022-06-23 | End: 2022-10-19 | Stop reason: SDUPTHER

## 2022-06-23 RX ORDER — NITROGLYCERIN 0.4 MG/1
0.4 TABLET SUBLINGUAL PRN
Qty: 25 TABLET | Refills: 3 | Status: SHIPPED | OUTPATIENT
Start: 2022-06-23

## 2022-06-23 RX ORDER — CARBOXYMETHYLCELLULOSE SODIUM 5 MG/ML
1 SOLUTION/ DROPS OPHTHALMIC DAILY PRN
Qty: 1 EACH | Refills: 3 | Status: SHIPPED | OUTPATIENT
Start: 2022-06-23 | End: 2022-10-19 | Stop reason: SDUPTHER

## 2022-06-23 RX ORDER — LEVOTHYROXINE SODIUM 0.03 MG/1
TABLET ORAL
Qty: 90 TABLET | Refills: 3 | Status: SHIPPED | OUTPATIENT
Start: 2022-06-23 | End: 2022-10-19 | Stop reason: SDUPTHER

## 2022-06-23 RX ORDER — ESCITALOPRAM OXALATE 10 MG/1
TABLET ORAL
Qty: 90 TABLET | Refills: 3 | Status: SHIPPED | OUTPATIENT
Start: 2022-06-23 | End: 2022-10-19 | Stop reason: SDUPTHER

## 2022-06-23 RX ORDER — BLOOD SUGAR DIAGNOSTIC
STRIP MISCELLANEOUS
Qty: 300 STRIP | Refills: 3 | Status: SHIPPED | OUTPATIENT
Start: 2022-06-23

## 2022-06-23 RX ORDER — LISINOPRIL 40 MG/1
40 TABLET ORAL DAILY
Qty: 90 TABLET | Refills: 1 | Status: SHIPPED | OUTPATIENT
Start: 2022-06-23 | End: 2022-10-19 | Stop reason: ALTCHOICE

## 2022-06-23 ASSESSMENT — ENCOUNTER SYMPTOMS
RESPIRATORY NEGATIVE: 1
EYES NEGATIVE: 1
ALLERGIC/IMMUNOLOGIC NEGATIVE: 1
GASTROINTESTINAL NEGATIVE: 1

## 2022-06-23 NOTE — PROGRESS NOTES
Chief Complaint   Patient presents with    Diabetes    Hypertension       Have you seen any other physician or provider since your last visit no    Have you had any other diagnostic tests since your last visit? no    Have you changed or stopped any medications since your last visit? no              SUBJECTIVE:    Patient Toya Valle is a 78 y.o. female. Chief Complaint   Patient presents with    Diabetes    Hypertension     HPI:  Patient has had hypertension for several years. She has been compliant with taking medications, without side effects from it. She has been following a low-sodium, is active and rarely exercises. Weight is fluctuating a bit, compared to last visit. Her blood pressure is stable 128/64 at this time. She has had some intermittent sharp chest pain. It comes and goes. She has had some subtle left arm pain. She is high risk age, diabetes, htn, and sedentary life style puts her at risk. Patient has had diabetes for the past several years. She has been compliant with the medications and denies any side effects from it. She has been monitoring fingersticks on a daily basis. Her fingerstick range is between 200-70. She has had some hypoglycemic symptoms. She has been following a diabetic diet and has been active. Her last eye exam was less than a year ago. She is using oral meds only. Patient's medications, allergies, past medical, surgical, social and family histories were reviewed and updated as appropriate. Review of Systems   Constitutional: Negative. HENT: Negative. Eyes: Negative. Respiratory: Negative. Cardiovascular: Positive for chest pain. Gastrointestinal: Negative. Endocrine: Negative. Genitourinary: Negative. Musculoskeletal: Negative. Skin: Negative. Allergic/Immunologic: Negative. Neurological: Negative. Hematological: Negative. Psychiatric/Behavioral: Negative.         OBJECTIVE:  /64 (Site: Right Upper Arm, Position: Sitting, Cuff Size: Medium Adult)   Pulse 60   Temp 97.1 °F (36.2 °C) (Temporal)   Resp 14   Ht 5' 2\" (1.575 m)   Wt 154 lb 9.6 oz (70.1 kg)   SpO2 95% Comment: room air  BMI 28.28 kg/m²    Physical Exam  Vitals and nursing note reviewed. Constitutional:       Appearance: She is well-developed. HENT:      Head: Normocephalic and atraumatic. Eyes:      Conjunctiva/sclera: Conjunctivae normal.      Pupils: Pupils are equal, round, and reactive to light. Neck:      Thyroid: No thyromegaly. Vascular: No JVD. Cardiovascular:      Rate and Rhythm: Normal rate and regular rhythm. Heart sounds: No murmur heard. No friction rub. No gallop. Pulmonary:      Effort: Pulmonary effort is normal. No respiratory distress. Breath sounds: Normal breath sounds. No wheezing or rales. Abdominal:      General: Bowel sounds are normal. There is no distension. Palpations: Abdomen is soft. Tenderness: There is no abdominal tenderness. There is no guarding. Musculoskeletal:         General: No tenderness. Cervical back: Normal range of motion and neck supple. Skin:     General: Skin is warm and dry. Findings: No rash. Neurological:      Mental Status: She is alert and oriented to person, place, and time.    Psychiatric:         Judgment: Judgment normal.         Results in Past 30 Days  Result Component Current Result Ref Range Previous Result Ref Range   Albumin/Globulin Ratio 1.8 (6/23/2022) 0.8 - 2.0 Not in Time Range    Albumin 4.2 (6/23/2022) 3.4 - 4.8 g/dL Not in Time Range    Alkaline Phosphatase 143 (H) (6/23/2022) 25 - 100 U/L Not in Time Range    ALT 20 (6/23/2022) 4 - 36 U/L Not in Time Range    AST 19 (6/23/2022) 8 - 33 U/L Not in Time Range    BUN 20 (6/23/2022) 6 - 20 mg/dL Not in Time Range    Calcium 9.0 (6/23/2022) 8.5 - 10.5 mg/dL Not in Time Range    Chloride 105 (6/23/2022) 98 - 107 mmol/L Not in Time Range    CO2 27 (6/23/2022) 20 - 30 mmol/L Not in Time Range    CREATININE 1.0 (6/23/2022) 0.4 - 1.2 mg/dL Not in Time Range    GFR  >59 (6/23/2022) >59 Not in Time Range    GFR Non- 54 (L) (6/23/2022) >59 Not in Time Range    Globulin 2.3 (6/23/2022) Not Established g/dL Not in Time Range    Glucose 120 (H) (6/23/2022) 74 - 106 mg/dL Not in Time Range    Potassium 4.8 (6/23/2022) 3.4 - 5.1 mmol/L Not in Time Range    Sodium 141 (6/23/2022) 136 - 145 mmol/L Not in Time Range    Total Bilirubin 0.3 (6/23/2022) 0.3 - 1.2 mg/dL Not in Time Range    Total Protein 6.5 (6/23/2022) 6.4 - 8.3 g/dL Not in Time Range        Hemoglobin A1C (%)   Date Value   06/23/2022 6.6 (H)     Microalbumin, Random Urine (mg/dL)   Date Value   06/23/2022 6.20     LDL Calculated (mg/dL)   Date Value   01/03/2022 67         Lab Results   Component Value Date    WBC 7.4 06/23/2022    NEUTROABS 3.4 04/07/2015    HGB 11.3 06/23/2022    HCT 36.3 06/23/2022    MCV 91.2 06/23/2022     06/23/2022       Lab Results   Component Value Date    TSH 2.87 06/23/2022         ASSESSMENT/PLAN:     1. Atypical chest pain    - nitroGLYCERIN (NITROSTAT) 0.4 MG SL tablet; Place 1 tablet under the tongue as needed for Chest pain  Dispense: 25 tablet; Refill: 3  - External Referral To Cardiology    2. Secondary hypertension    - amLODIPine (NORVASC) 5 MG tablet; Take 1 tablet by mouth daily  Dispense: 30 tablet; Refill: 3  - lisinopril (PRINIVIL;ZESTRIL) 40 MG tablet; Take 1 tablet by mouth daily  Dispense: 90 tablet; Refill: 1    3. Type 2 diabetes mellitus without complication, without long-term current use of insulin (HCC)    - metFORMIN (GLUCOPHAGE) 500 MG tablet; Take 1 tablet by mouth 3 times daily (with meals)  Dispense: 90 tablet; Refill: 3  - blood glucose test strips (ACCU-CHEK TAYLA PLUS) strip; use 1 three times a day  Dispense: 300 strip; Refill: 3  - Accu-Chek Multiclix Lancets MISC; Finger stick tid  Dispense: 100 each; Refill: 3    4.  Acquired hypothyroidism    - levothyroxine (SYNTHROID) 25 MCG tablet; take 1 tablet by mouth once daily  Dispense: 90 tablet; Refill: 3    5. Allergic rhinitis, unspecified seasonality, unspecified trigger    - fluticasone (FLONASE) 50 MCG/ACT nasal spray; instill 2 sprays into each nostril once daily. 90 day supply  Dispense: 16 g; Refill: 3  - fexofenadine (ALLEGRA) 180 MG tablet; Take 1 tablet by mouth daily  Dispense: 90 tablet; Refill: 3    6. Gastroesophageal reflux disease, unspecified whether esophagitis present    - esomeprazole (NEXIUM) 40 MG delayed release capsule; 1 po daily in the am 30 minutes before breakfast  Dispense: 90 capsule; Refill: 3  - sucralfate (CARAFATE) 1 GM tablet; Take 1 tablet by mouth 4 times daily  Dispense: 120 tablet; Refill: 2    7. Depression, unspecified depression type    - escitalopram (LEXAPRO) 10 MG tablet; take 1 tablet by mouth once daily  Dispense: 90 tablet; Refill: 3    8. Vitamin D deficiency    - ergocalciferol (DRISDOL) 1.25 MG (85625 UT) capsule; Take 1 capsule by mouth once a week 90 day supply  Dispense: 12 capsule; Refill: 3    9. Memory loss    - donepezil (ARICEPT) 10 MG tablet; TAKE ONE TABLET BY MOUTH EVERY DAY AT NIGHT  Dispense: 90 tablet; Refill: 1    10. Pure hypercholesterolemia    - atorvastatin (LIPITOR) 20 MG tablet; Take 1 tablet by mouth daily  Dispense: 90 tablet; Refill: 3  - atenolol (TENORMIN) 50 MG tablet; TAKE ONE TABLET BY MOUTH EVERY DAY  Dispense: 90 tablet; Refill: 1    11. Essential hypertension    - aspirin 81 MG EC tablet; Take 1 tablet by mouth daily  Dispense: 90 tablet; Refill: 3    12. Osteoporosis, unspecified osteoporosis type, unspecified pathological fracture presence    - alendronate (FOSAMAX) 70 MG tablet; take 1 tablet by mouth every week  Dispense: 12 tablet; Refill: 3    13. Irritable bowel syndrome with diarrhea    - dicyclomine (BENTYL) 20 MG tablet; Take 1 tablet by mouth every 6 hours  Dispense: 120 tablet; Refill: 2    14.

## 2022-07-11 ENCOUNTER — LAB (OUTPATIENT)
Dept: LAB | Facility: HOSPITAL | Age: 77
End: 2022-07-11

## 2022-07-11 DIAGNOSIS — Z11.59 ENCOUNTER FOR SCREENING FOR OTHER VIRAL DISEASES: ICD-10-CM

## 2022-07-11 DIAGNOSIS — K76.0 FATTY (CHANGE OF) LIVER, NOT ELSEWHERE CLASSIFIED: ICD-10-CM

## 2022-07-11 DIAGNOSIS — I10 ESSENTIAL HYPERTENSION: ICD-10-CM

## 2022-07-11 DIAGNOSIS — R74.8 ELEVATED ALKALINE PHOSPHATASE LEVEL: ICD-10-CM

## 2022-07-11 LAB
ALBUMIN SERPL-MCNC: 4.4 G/DL (ref 3.5–5.2)
ALBUMIN/GLOB SERPL: 2 G/DL
ALP SERPL-CCNC: 140 U/L (ref 39–117)
ALT SERPL W P-5'-P-CCNC: 21 U/L (ref 1–33)
ANION GAP SERPL CALCULATED.3IONS-SCNC: 10 MMOL/L (ref 5–15)
AST SERPL-CCNC: 18 U/L (ref 1–32)
BILIRUB SERPL-MCNC: 0.4 MG/DL (ref 0–1.2)
BUN SERPL-MCNC: 22 MG/DL (ref 8–23)
BUN/CREAT SERPL: 20.6 (ref 7–25)
CALCIUM SPEC-SCNC: 9.4 MG/DL (ref 8.6–10.5)
CHLORIDE SERPL-SCNC: 105 MMOL/L (ref 98–107)
CO2 SERPL-SCNC: 25 MMOL/L (ref 22–29)
CREAT SERPL-MCNC: 1.07 MG/DL (ref 0.57–1)
EGFRCR SERPLBLD CKD-EPI 2021: 54.3 ML/MIN/1.73
GLOBULIN UR ELPH-MCNC: 2.2 GM/DL
GLUCOSE SERPL-MCNC: 140 MG/DL (ref 65–99)
HBV SURFACE AG SERPL QL IA: NORMAL
HCV AB SER DONR QL: NORMAL
POTASSIUM SERPL-SCNC: 4.5 MMOL/L (ref 3.5–5.2)
PROT SERPL-MCNC: 6.6 G/DL (ref 6–8.5)
SODIUM SERPL-SCNC: 140 MMOL/L (ref 136–145)

## 2022-07-11 PROCEDURE — 36415 COLL VENOUS BLD VENIPUNCTURE: CPT

## 2022-07-11 PROCEDURE — 87340 HEPATITIS B SURFACE AG IA: CPT

## 2022-07-11 PROCEDURE — 86803 HEPATITIS C AB TEST: CPT

## 2022-07-11 PROCEDURE — 83883 ASSAY NEPHELOMETRY NOT SPEC: CPT

## 2022-07-11 PROCEDURE — 86708 HEPATITIS A ANTIBODY: CPT

## 2022-07-11 PROCEDURE — 82172 ASSAY OF APOLIPOPROTEIN: CPT

## 2022-07-11 PROCEDURE — 80053 COMPREHEN METABOLIC PANEL: CPT

## 2022-07-11 PROCEDURE — 82977 ASSAY OF GGT: CPT

## 2022-07-11 PROCEDURE — 84478 ASSAY OF TRIGLYCERIDES: CPT

## 2022-07-11 PROCEDURE — 82465 ASSAY BLD/SERUM CHOLESTEROL: CPT

## 2022-07-11 PROCEDURE — 83010 ASSAY OF HAPTOGLOBIN QUANT: CPT

## 2022-07-11 PROCEDURE — 86317 IMMUNOASSAY INFECTIOUS AGENT: CPT

## 2022-07-11 PROCEDURE — 86704 HEP B CORE ANTIBODY TOTAL: CPT

## 2022-07-12 LAB
HAV AB SER QL IA: POSITIVE
HBV CORE AB SERPL QL IA: NEGATIVE
HBV SURFACE AB SER-ACNC: <3.1 MIU/ML

## 2022-07-13 LAB
A2 MACROGLOB SERPL-MCNC: 246 MG/DL (ref 110–276)
ALT SERPL W P-5'-P-CCNC: 25 IU/L (ref 0–40)
APO A-I SERPL-MCNC: 147 MG/DL (ref 116–209)
AST SERPL W P-5'-P-CCNC: 22 IU/L (ref 0–40)
BILIRUB SERPL-MCNC: 0.4 MG/DL (ref 0–1.2)
CHOLEST SERPL-MCNC: 162 MG/DL (ref 100–199)
FIBROSIS SCORING:: ABNORMAL
FIBROSIS STAGE SERPL QL: ABNORMAL
GGT SERPL-CCNC: 16 IU/L (ref 0–60)
GLUCOSE SERPL-MCNC: 139 MG/DL (ref 65–99)
HAPTOGLOB SERPL-MCNC: 181 MG/DL (ref 42–346)
INTERPRETATIONS: (REFERENCE): ABNORMAL
LABORATORY COMMENT REPORT: ABNORMAL
LIVER FIBR SCORE SERPL CALC.FIBROSURE: 0.25 (ref 0–0.21)
NASH SCORING (REFERENCE): ABNORMAL
NECROINFLAMMATORY ACT GRADE SERPL QL: ABNORMAL
NECROINFLAMMATORY ACT SCORE SERPL: 0.75
SERVICE CMNT-IMP: ABNORMAL
STEATOSIS GRADE (REFERENCE): ABNORMAL
STEATOSIS GRADING (REFERENCE): ABNORMAL
STEATOSIS SCORE (REFERENCE): 0.62 (ref 0–0.3)
TRIGL SERPL-MCNC: 212 MG/DL (ref 0–149)

## 2022-07-19 ENCOUNTER — OFFICE VISIT (OUTPATIENT)
Dept: PRIMARY CARE CLINIC | Age: 77
End: 2022-07-19
Payer: MEDICARE

## 2022-07-19 VITALS — OXYGEN SATURATION: 96 % | HEART RATE: 90 BPM | TEMPERATURE: 98.4 F

## 2022-07-19 DIAGNOSIS — Z20.822 SUSPECTED COVID-19 VIRUS INFECTION: Primary | ICD-10-CM

## 2022-07-19 DIAGNOSIS — Z20.822 EXPOSURE TO COVID-19 VIRUS: ICD-10-CM

## 2022-07-19 PROCEDURE — 3017F COLORECTAL CA SCREEN DOC REV: CPT | Performed by: PHYSICIAN ASSISTANT

## 2022-07-19 PROCEDURE — 1123F ACP DISCUSS/DSCN MKR DOCD: CPT | Performed by: PHYSICIAN ASSISTANT

## 2022-07-19 PROCEDURE — 1036F TOBACCO NON-USER: CPT | Performed by: PHYSICIAN ASSISTANT

## 2022-07-19 PROCEDURE — G8417 CALC BMI ABV UP PARAM F/U: HCPCS | Performed by: PHYSICIAN ASSISTANT

## 2022-07-19 PROCEDURE — G8399 PT W/DXA RESULTS DOCUMENT: HCPCS | Performed by: PHYSICIAN ASSISTANT

## 2022-07-19 PROCEDURE — 99213 OFFICE O/P EST LOW 20 MIN: CPT | Performed by: PHYSICIAN ASSISTANT

## 2022-07-19 PROCEDURE — 1090F PRES/ABSN URINE INCON ASSESS: CPT | Performed by: PHYSICIAN ASSISTANT

## 2022-07-19 PROCEDURE — G8428 CUR MEDS NOT DOCUMENT: HCPCS | Performed by: PHYSICIAN ASSISTANT

## 2022-07-19 PROCEDURE — 87635 SARS-COV-2 COVID-19 AMP PRB: CPT | Performed by: PHYSICIAN ASSISTANT

## 2022-07-19 ASSESSMENT — ENCOUNTER SYMPTOMS
GASTROINTESTINAL NEGATIVE: 1
RESPIRATORY NEGATIVE: 1

## 2022-07-19 NOTE — PROGRESS NOTES
SUBJECTIVE:    Patient ID: Markel Curry is a 78 y. o.female. No chief complaint on file. HPI:    Pt here with c/o being exposed to covid 2 days ago. She denies any symptoms at this time. She would just like to be tested. Patient's medications, allergies, past medical, surgical, social and family histories were reviewed and updated as appropriate in electronic medical record. Current Outpatient Medications on File Prior to Visit   Medication Sig Dispense Refill    amLODIPine (NORVASC) 5 MG tablet Take 1 tablet by mouth daily 30 tablet 3    montelukast (SINGULAIR) 10 MG tablet take 1 tablet by mouth at bedtime 90 tablet 3    nitroGLYCERIN (NITROSTAT) 0.4 MG SL tablet Place 1 tablet under the tongue as needed for Chest pain 25 tablet 3    metFORMIN (GLUCOPHAGE) 500 MG tablet Take 1 tablet by mouth 3 times daily (with meals) 90 tablet 3    lisinopril (PRINIVIL;ZESTRIL) 40 MG tablet Take 1 tablet by mouth daily 90 tablet 1    levothyroxine (SYNTHROID) 25 MCG tablet take 1 tablet by mouth once daily 90 tablet 3    fluticasone (FLONASE) 50 MCG/ACT nasal spray instill 2 sprays into each nostril once daily.  90 day supply 16 g 3    fexofenadine (ALLEGRA) 180 MG tablet Take 1 tablet by mouth daily 90 tablet 3    esomeprazole (NEXIUM) 40 MG delayed release capsule 1 po daily in the am 30 minutes before breakfast 90 capsule 3    escitalopram (LEXAPRO) 10 MG tablet take 1 tablet by mouth once daily 90 tablet 3    ergocalciferol (DRISDOL) 1.25 MG (44219 UT) capsule Take 1 capsule by mouth once a week 90 day supply 12 capsule 3    donepezil (ARICEPT) 10 MG tablet TAKE ONE TABLET BY MOUTH EVERY DAY AT NIGHT 90 tablet 1    carboxymethylcellulose (REFRESH PLUS) 0.5 % SOLN ophthalmic solution Apply 1 drop to eye daily as needed (dry eyes) 1 each 3    blood glucose test strips (ACCU-CHEK TAYLA PLUS) strip use 1 three times a day 300 strip 3    atorvastatin (LIPITOR) 20 MG tablet Take 1 tablet by mouth daily 90 tablet 3    atenolol (TENORMIN) 50 MG tablet TAKE ONE TABLET BY MOUTH EVERY DAY 90 tablet 1    aspirin 81 MG EC tablet Take 1 tablet by mouth daily 90 tablet 3    alendronate (FOSAMAX) 70 MG tablet take 1 tablet by mouth every week 12 tablet 3    Accu-Chek Multiclix Lancets MISC Finger stick tid 100 each 3    sucralfate (CARAFATE) 1 GM tablet Take 1 tablet by mouth 4 times daily 120 tablet 2    dicyclomine (BENTYL) 20 MG tablet Take 1 tablet by mouth every 6 hours 120 tablet 2    triamcinolone (KENALOG) 0.1 % cream       nitroGLYCERIN (MINITRAN) 0.4 MG/HR Place 1 patch onto the skin daily 30 patch 3    Biotin 5000 MCG TABS Take by mouth      Lactobacillus (PROBIOTIC ACIDOPHILUS PO) Take by mouth      calcium carbonate 600 MG TABS tablet Take 1 tablet by mouth daily. Current Facility-Administered Medications on File Prior to Visit   Medication Dose Route Frequency Provider Last Rate Last Admin    cyanocobalamin injection 1,000 mcg  1,000 mcg IntraMUSCular Once Jeffy Cristi, APRN        cloNIDine (CATAPRES) tablet 0.1 mg  0.1 mg Oral BID Jeffy Cristi, APRN        cyanocobalamin injection 1,000 mcg  1,000 mcg IntraMUSCular Once [employer], DO            Review of Systems   Constitutional: Negative. HENT: Negative. Respiratory: Negative. Cardiovascular: Negative. Gastrointestinal: Negative. Skin: Negative. Neurological: Negative. Psychiatric/Behavioral: Negative.        Past Medical History:   Diagnosis Date    Anxiety     Arthritis     Asthma     Diabetes mellitus ([facility] [address]     GERD (gastroesophageal reflux disease)     Hyperlipidemia     Hypertension     Hypothyroidism     Melanoma ([facility] [address] 2003    skin cancer removed from [facility]    Osteoporosis     Sleep apnea     Thyroid nodule      Past Surgical History:   Procedure Laterality Date    BREAST BIOPSY      COLON SURGERY      POLYPS REMOVED    COLONOSCOPY  02/24/2017    Dr Abby Arenas Bilateral 07/2020    cataract    EYE SURGERY 06/23/2022 10:03 AM    CO2 27 06/23/2022 10:03 AM    GLUCOSE 120 06/23/2022 10:03 AM    BUN 20 06/23/2022 10:03 AM    CREATININE 1.0 06/23/2022 10:03 AM    CALCIUM 9.0 06/23/2022 10:03 AM    PROT 6.5 06/23/2022 10:03 AM    LABALBU 4.2 06/23/2022 10:03 AM    BILITOT 0.3 06/23/2022 10:03 AM    ALT 20 06/23/2022 10:03 AM    AST 19 06/23/2022 10:03 AM     Hemoglobin A1C (%)   Date Value   06/23/2022 6.6 (H)     Microalbumin, Random Urine (mg/dL)   Date Value   06/23/2022 6.20     LDL Calculated (mg/dL)   Date Value   01/03/2022 67       Lab Results   Component Value Date/Time    WBC 7.4 06/23/2022 10:03 AM    NEUTROABS 3.4 04/07/2015 09:32 AM    HGB 11.3 06/23/2022 10:03 AM    HCT 36.3 06/23/2022 10:03 AM    MCV 91.2 06/23/2022 10:03 AM     06/23/2022 10:03 AM     Lab Results   Component Value Date    TSH 2.87 06/23/2022       ASSESSMENT/PLAN:     1. Suspected COVID-19 virus infection  - POCT COVID-19 Rapid, NAAT    2. Exposure to COVID-19 virus  -She was negative for COVID in clinic. She is having no symptoms. She was advised to watch for symptoms and if they develop return to clinic for retesting. No orders of the defined types were placed in this encounter.        Electronically signed by Pam Alvarez on 7/19/2022 at 2:58 PM

## 2022-07-20 LAB
Lab: NORMAL
QC PASS/FAIL: NORMAL
SARS-COV-2 RDRP RESP QL NAA+PROBE: NEGATIVE

## 2022-08-11 ENCOUNTER — HOSPITAL ENCOUNTER (OUTPATIENT)
Dept: GENERAL RADIOLOGY | Facility: HOSPITAL | Age: 77
Discharge: HOME | End: 2022-08-11
Payer: MEDICARE

## 2022-08-11 ENCOUNTER — OFFICE VISIT (OUTPATIENT)
Dept: CARDIOLOGY | Facility: CLINIC | Age: 77
End: 2022-08-11

## 2022-08-11 ENCOUNTER — HOSPITAL ENCOUNTER (OUTPATIENT)
Facility: HOSPITAL | Age: 77
Discharge: HOME | End: 2022-08-11
Payer: MEDICARE

## 2022-08-11 VITALS
BODY MASS INDEX: 29.08 KG/M2 | WEIGHT: 158 LBS | SYSTOLIC BLOOD PRESSURE: 126 MMHG | HEART RATE: 63 BPM | DIASTOLIC BLOOD PRESSURE: 82 MMHG | OXYGEN SATURATION: 97 % | HEIGHT: 62 IN

## 2022-08-11 DIAGNOSIS — R06.02 SHORTNESS OF BREATH: ICD-10-CM

## 2022-08-11 DIAGNOSIS — R06.02 SOB (SHORTNESS OF BREATH): ICD-10-CM

## 2022-08-11 DIAGNOSIS — R07.9 CHEST PAIN, UNSPECIFIED TYPE: ICD-10-CM

## 2022-08-11 DIAGNOSIS — R07.89 CHEST PAIN, ATYPICAL: Primary | ICD-10-CM

## 2022-08-11 PROBLEM — R07.2 PRECORDIAL PAIN: Status: ACTIVE | Noted: 2022-08-11

## 2022-08-11 PROCEDURE — 93005 ELECTROCARDIOGRAM TRACING: CPT

## 2022-08-11 PROCEDURE — 71046 X-RAY EXAM CHEST 2 VIEWS: CPT

## 2022-08-11 PROCEDURE — 99204 OFFICE O/P NEW MOD 45 MIN: CPT | Performed by: INTERNAL MEDICINE

## 2022-08-11 PROCEDURE — 93000 ELECTROCARDIOGRAM COMPLETE: CPT | Performed by: INTERNAL MEDICINE

## 2022-08-11 RX ORDER — SUCRALFATE 1 G/1
1 TABLET ORAL 4 TIMES DAILY
COMMUNITY

## 2022-08-11 RX ORDER — NITROGLYCERIN 0.4 MG/1
0.4 TABLET SUBLINGUAL
COMMUNITY

## 2022-08-11 RX ORDER — PHENOL 1.4 %
600 AEROSOL, SPRAY (ML) MUCOUS MEMBRANE DAILY
COMMUNITY

## 2022-08-11 RX ORDER — ERGOCALCIFEROL 1.25 MG/1
50000 CAPSULE ORAL WEEKLY
COMMUNITY
Start: 2022-06-23

## 2022-08-22 ENCOUNTER — HOSPITAL ENCOUNTER (OUTPATIENT)
Dept: NON INVASIVE DIAGNOSTICS | Facility: HOSPITAL | Age: 77
Discharge: HOME | End: 2022-08-22
Payer: MEDICARE

## 2022-08-22 DIAGNOSIS — E11.9 TYPE 2 DIABETES MELLITUS WITHOUT COMPLICATION, WITHOUT LONG-TERM CURRENT USE OF INSULIN (HCC): ICD-10-CM

## 2022-08-22 LAB
LV EF: 63 %
LVEF MODALITY: NORMAL

## 2022-08-22 PROCEDURE — 93306 TTE W/DOPPLER COMPLETE: CPT

## 2022-08-24 ENCOUNTER — OUTSIDE FACILITY SERVICE (OUTPATIENT)
Dept: CARDIOLOGY | Facility: CLINIC | Age: 77
End: 2022-08-24

## 2022-08-24 PROCEDURE — 93308 TTE F-UP OR LMTD: CPT | Performed by: INTERNAL MEDICINE

## 2022-08-25 ENCOUNTER — HOSPITAL ENCOUNTER (OUTPATIENT)
Dept: NON INVASIVE DIAGNOSTICS | Facility: HOSPITAL | Age: 77
Discharge: HOME | End: 2022-08-25
Payer: MEDICARE

## 2022-08-25 ENCOUNTER — HOSPITAL ENCOUNTER (EMERGENCY)
Facility: HOSPITAL | Age: 77
Discharge: HOME | End: 2022-08-25
Attending: HOSPITALIST
Payer: MEDICARE

## 2022-08-25 ENCOUNTER — APPOINTMENT (OUTPATIENT)
Dept: GENERAL RADIOLOGY | Facility: HOSPITAL | Age: 77
End: 2022-08-25
Payer: MEDICARE

## 2022-08-25 VITALS
BODY MASS INDEX: 28.89 KG/M2 | WEIGHT: 157 LBS | HEIGHT: 62 IN | OXYGEN SATURATION: 98 % | RESPIRATION RATE: 17 BRPM | TEMPERATURE: 97.6 F | DIASTOLIC BLOOD PRESSURE: 76 MMHG | SYSTOLIC BLOOD PRESSURE: 182 MMHG | HEART RATE: 68 BPM

## 2022-08-25 DIAGNOSIS — T78.40XA ALLERGIC REACTION TO DRUG, INITIAL ENCOUNTER: Primary | ICD-10-CM

## 2022-08-25 DIAGNOSIS — I10 ESSENTIAL HYPERTENSION: ICD-10-CM

## 2022-08-25 LAB
A/G RATIO: 1.6 (ref 0.8–2)
ALBUMIN SERPL-MCNC: 4.4 G/DL (ref 3.4–4.8)
ALP BLD-CCNC: 167 U/L (ref 25–100)
ALT SERPL-CCNC: 17 U/L (ref 4–36)
ANION GAP SERPL CALCULATED.3IONS-SCNC: 12 MMOL/L (ref 3–16)
AST SERPL-CCNC: 16 U/L (ref 8–33)
BASOPHILS ABSOLUTE: 0 K/UL (ref 0–0.1)
BASOPHILS RELATIVE PERCENT: 0.4 %
BILIRUB SERPL-MCNC: 0.5 MG/DL (ref 0.3–1.2)
BUN BLDV-MCNC: 18 MG/DL (ref 6–20)
CALCIUM SERPL-MCNC: 9.3 MG/DL (ref 8.5–10.5)
CHLORIDE BLD-SCNC: 103 MMOL/L (ref 98–107)
CO2: 23 MMOL/L (ref 20–30)
CREAT SERPL-MCNC: 0.9 MG/DL (ref 0.4–1.2)
EOSINOPHILS ABSOLUTE: 0.1 K/UL (ref 0–0.4)
EOSINOPHILS RELATIVE PERCENT: 1.3 %
GFR AFRICAN AMERICAN: >59
GFR NON-AFRICAN AMERICAN: >60
GLOBULIN: 2.8 G/DL
GLUCOSE BLD-MCNC: 186 MG/DL
GLUCOSE BLD-MCNC: 217 MG/DL (ref 74–106)
HCT VFR BLD CALC: 37.7 % (ref 37–47)
HEMOGLOBIN: 11.9 G/DL (ref 11.5–16.5)
IMMATURE GRANULOCYTES #: 0 K/UL
IMMATURE GRANULOCYTES %: 0.4 % (ref 0–5)
LV EF: 82 %
LVEF MODALITY: NORMAL
LYMPHOCYTES ABSOLUTE: 1.3 K/UL (ref 1.5–4)
LYMPHOCYTES RELATIVE PERCENT: 18.1 %
MCH RBC QN AUTO: 28 PG (ref 27–32)
MCHC RBC AUTO-ENTMCNC: 31.6 G/DL (ref 31–35)
MCV RBC AUTO: 88.7 FL (ref 80–100)
MONOCYTES ABSOLUTE: 0.5 K/UL (ref 0.2–0.8)
MONOCYTES RELATIVE PERCENT: 7.6 %
NEUTROPHILS ABSOLUTE: 5 K/UL (ref 2–7.5)
NEUTROPHILS RELATIVE PERCENT: 72.2 %
PDW BLD-RTO: 13.2 % (ref 11–16)
PLATELET # BLD: 239 K/UL (ref 150–400)
PMV BLD AUTO: 10.9 FL (ref 6–10)
POTASSIUM REFLEX MAGNESIUM: 3.7 MMOL/L (ref 3.4–5.1)
RBC # BLD: 4.25 M/UL (ref 3.8–5.8)
SODIUM BLD-SCNC: 138 MMOL/L (ref 136–145)
TOTAL PROTEIN: 7.2 G/DL (ref 6.4–8.3)
TROPONIN: <0.3 NG/ML
WBC # BLD: 7 K/UL (ref 4–11)

## 2022-08-25 PROCEDURE — 3430000000 HC RX DIAGNOSTIC RADIOPHARMACEUTICAL: Performed by: INTERNAL MEDICINE

## 2022-08-25 PROCEDURE — 78452 HT MUSCLE IMAGE SPECT MULT: CPT

## 2022-08-25 PROCEDURE — 71045 X-RAY EXAM CHEST 1 VIEW: CPT

## 2022-08-25 PROCEDURE — 2580000003 HC RX 258: Performed by: HOSPITALIST

## 2022-08-25 PROCEDURE — 93017 CV STRESS TEST TRACING ONLY: CPT

## 2022-08-25 PROCEDURE — A9500 TC99M SESTAMIBI: HCPCS | Performed by: INTERNAL MEDICINE

## 2022-08-25 PROCEDURE — 6360000002 HC RX W HCPCS: Performed by: INTERNAL MEDICINE

## 2022-08-25 PROCEDURE — 80053 COMPREHEN METABOLIC PANEL: CPT

## 2022-08-25 PROCEDURE — 85025 COMPLETE CBC W/AUTO DIFF WBC: CPT

## 2022-08-25 PROCEDURE — 99285 EMERGENCY DEPT VISIT HI MDM: CPT

## 2022-08-25 PROCEDURE — 84484 ASSAY OF TROPONIN QUANT: CPT

## 2022-08-25 PROCEDURE — 36415 COLL VENOUS BLD VENIPUNCTURE: CPT

## 2022-08-25 PROCEDURE — 96374 THER/PROPH/DIAG INJ IV PUSH: CPT

## 2022-08-25 RX ORDER — 0.9 % SODIUM CHLORIDE 0.9 %
1000 INTRAVENOUS SOLUTION INTRAVENOUS ONCE
Status: COMPLETED | OUTPATIENT
Start: 2022-08-25 | End: 2022-08-25

## 2022-08-25 RX ORDER — NITROGLYCERIN 0.4 MG/1
0.4 TABLET SUBLINGUAL EVERY 5 MIN PRN
OUTPATIENT
Start: 2022-08-25 | End: 2022-08-25

## 2022-08-25 RX ORDER — ALBUTEROL SULFATE 90 UG/1
2 AEROSOL, METERED RESPIRATORY (INHALATION) PRN
OUTPATIENT
Start: 2022-08-25 | End: 2022-08-25

## 2022-08-25 RX ORDER — AMINOPHYLLINE DIHYDRATE 25 MG/ML
50 INJECTION, SOLUTION INTRAVENOUS PRN
OUTPATIENT
Start: 2022-08-25 | End: 2022-08-25

## 2022-08-25 RX ORDER — ATROPINE SULFATE 0.1 MG/ML
0.5 INJECTION INTRAVENOUS EVERY 5 MIN PRN
OUTPATIENT
Start: 2022-08-25 | End: 2022-08-25

## 2022-08-25 RX ORDER — METOPROLOL TARTRATE 5 MG/5ML
5 INJECTION INTRAVENOUS EVERY 5 MIN PRN
OUTPATIENT
Start: 2022-08-25 | End: 2022-08-25

## 2022-08-25 RX ORDER — SODIUM CHLORIDE 0.9 % (FLUSH) 0.9 %
5-40 SYRINGE (ML) INJECTION PRN
OUTPATIENT
Start: 2022-08-25 | End: 2022-08-25

## 2022-08-25 RX ORDER — SODIUM CHLORIDE 9 MG/ML
500 INJECTION, SOLUTION INTRAVENOUS CONTINUOUS PRN
OUTPATIENT
Start: 2022-08-25 | End: 2022-08-25

## 2022-08-25 RX ADMIN — TETRAKIS(2-METHOXYISOBUTYLISOCYANIDE)COPPER(I) TETRAFLUOROBORATE 10 MILLICURIE: 1 INJECTION, POWDER, LYOPHILIZED, FOR SOLUTION INTRAVENOUS at 08:10

## 2022-08-25 RX ADMIN — REGADENOSON 0.4 MG: 0.08 INJECTION, SOLUTION INTRAVENOUS at 09:25

## 2022-08-25 RX ADMIN — TETRAKIS(2-METHOXYISOBUTYLISOCYANIDE)COPPER(I) TETRAFLUOROBORATE 30 MILLICURIE: 1 INJECTION, POWDER, LYOPHILIZED, FOR SOLUTION INTRAVENOUS at 09:35

## 2022-08-25 RX ADMIN — SODIUM CHLORIDE 1000 ML: 9 INJECTION, SOLUTION INTRAVENOUS at 11:55

## 2022-08-25 ASSESSMENT — PAIN - FUNCTIONAL ASSESSMENT
PAIN_FUNCTIONAL_ASSESSMENT: 0-10
PAIN_FUNCTIONAL_ASSESSMENT: NONE - DENIES PAIN

## 2022-08-25 ASSESSMENT — PAIN SCALES - GENERAL: PAINLEVEL_OUTOF10: 0

## 2022-08-25 NOTE — ED NOTES
Reviewed discharge plan with Aniket Norris. Encouraged her to f/u with MICKI Ballard  and Dr Jane Mariano and she understood. NAD noted on discharge, gait steady.         Electronically signed by Kelsea Kinsey RN on 8/25/2022 at 12:49 PM       Kelsea Kinsey RN  08/25/22 [id number]

## 2022-08-25 NOTE — ED NOTES
Pt had presented to ER after having stress test- pt stated after having test she was ambulating down the [place] when she had severe pain in arms, elbows, legs, knees- states pain was a 10/10 but has eased once laying down in ER- pt states she also developed some chest tightness but that also has eased since laying down- unsure about being short of breat- patient states episode lasted about 15 min with slight nausea-pt states she had been having left arm pain for awhile but is one reason they decided to do stress test- pt is A/O x3 at this time- tells Dr. Elva Parson she does have slight HA and did not take any of her medications at this time      Pankaj Luz, RN  08/25/22 [id number], RN  08/25/22 [id number]

## 2022-08-25 NOTE — ED NOTES
I ask if patient had any family with her- pt states no and she does not wish to have anyone called at this time     Zhou Lamar RN  08/25/22 [id number]

## 2022-08-25 NOTE — ED TRIAGE NOTES
Called and spoke to [address] [facility] [address] at Cardiology clinic- states patient had stress test this morning- tolerated test ok with slight nausea- after finished patient started to c/o legs and arms hurting after walking down the hallway- pt had lexiscan

## 2022-08-25 NOTE — ED PROVIDER NOTES
[address] ENCOUNTER      Pt Name: Geetha Pathak  MRN: [id number]  YOB: 1945  Date of evaluation: 8/25/2022  Provider: Marylen Foyer, DO    CHIEF COMPLAINT       Chief Complaint   Patient presents with    Knee Pain    Chest Pain           HISTORY OF PRESENT ILLNESS  (Location/Symptom, Timing/Onset, Context/Setting, Quality, Duration, Modifying Factors, Severity.)   Geetha Pathak is a 78 y.o. female who presents to the emergency department for chest pain, all extremity pain, nausea, lightheadedness and sensation that she was going to pass out. Patient is a diabetic states that she had to fast this morning because she was having cardiac stress test performed in [facility] today. She states that she had already started to have the stress test done was almost completed. She was in the process of having the Lexiscan portion done of the test.  She states that she was already given the medication through the IV she was setting out in the lobby eating a snack because she had to fast this morning. She states that she also had to hold some of her blood pressure medication this morning prior to the test.  However while she was sitting in the lobby she states that she started had the sensation that she got hot all over she states that it felt like her joints especially her elbows knees were going to just pop from pressure. She states that she did get some mild lightheadedness. She felt like she was going to pass out but did not. She got nauseated but did not have any vomiting. She states that she also had a pressure sensation in her chest.  She did not sure if she was short of breath with it or not. Patient states that symptoms lasted approximately 15 minutes. She was brought over here to the emergency department for evaluation because she had not informed him of her symptoms over radiology.   Once the patient was here and placed in the room she states her symptoms have eased off and almost completely went away. She no longer complains of any chest pressure. She states the joint pain has almost completely resolved also now that she is lying in the bed in a comfortable position. She states she still has a very mild headache but denies any visual changes. Denies any loss of taste or smell. Denies any shortness of breath at this time. Denies any dysuria or change in her frequency. Denies any abdominal pain. No longer has any symptoms of nausea. Nursing notes were reviewed. REVIEW OFSYSTEMS    (2-9 systems for level 4, 10 or more for level 5)   ROS:  General:  No fevers, no chills, no weakness  Cardiovascular:  + chest pain/pressure, no palpitations  Respiratory:  No shortness of breath, no cough, no wheezing  Gastrointestinal:  No pain, + nausea, no vomiting, no diarrhea  Musculoskeletal:  No muscle pain, + joint pain/pressure to bilateral arms and legs  Skin:  No rash, no easy bruising  Neurologic:  No speech problems, + headache, no extremity weakness  Psychiatric:  No anxiety  Genitourinary:  No dysuria, no hematuria    Except as noted above the remainder of the review of systems was reviewed and negative.        PAST MEDICAL HISTORY     Past Medical History:   Diagnosis Date    Anxiety     Arthritis     Asthma     Diabetes mellitus ([facility] [address]     GERD (gastroesophageal reflux disease)     Hyperlipidemia     Hypertension     Hypothyroidism     Melanoma ([facility] [address] 2003    skin cancer removed from [facility]    Osteoporosis     Sleep apnea     Thyroid nodule          SURGICAL HISTORY       Past Surgical History:   Procedure Laterality Date    BREAST BIOPSY      COLON SURGERY      POLYPS REMOVED    COLONOSCOPY  02/24/2017    Dr David Abrams Bilateral 07/2020    cataract    EYE SURGERY Right 01/05/2022    laser    POLYPECTOMY      SKIN CANCER EXCISION      RIGHT LEG     THYROIDECTOMY      WRIST SURGERY      LEFT         CURRENT MEDICATIONS Previous Medications    ACCU-CHEK MULTICLIX LANCETS MISC    Finger stick tid    ALENDRONATE (FOSAMAX) 70 MG TABLET    take 1 tablet by mouth every week    AMLODIPINE (NORVASC) 5 MG TABLET    Take 1 tablet by mouth daily    ASPIRIN 81 MG EC TABLET    Take 1 tablet by mouth daily    ATENOLOL (TENORMIN) 50 MG TABLET    TAKE ONE TABLET BY MOUTH EVERY DAY    ATORVASTATIN (LIPITOR) 20 MG TABLET    Take 1 tablet by mouth daily    BIOTIN 5000 MCG TABS    Take by mouth    BLOOD GLUCOSE TEST STRIPS (ACCU-CHEK TAYLA PLUS) STRIP    use 1 three times a day    CALCIUM CARBONATE 600 MG TABS TABLET    Take 1 tablet by mouth daily. CARBOXYMETHYLCELLULOSE (REFRESH PLUS) 0.5 % SOLN OPHTHALMIC SOLUTION    Apply 1 drop to eye daily as needed (dry eyes)    DICYCLOMINE (BENTYL) 20 MG TABLET    Take 1 tablet by mouth every 6 hours    DONEPEZIL (ARICEPT) 10 MG TABLET    TAKE ONE TABLET BY MOUTH EVERY DAY AT NIGHT    ERGOCALCIFEROL (DRISDOL) 1.25 MG (81974 UT) CAPSULE    Take 1 capsule by mouth once a week 90 day supply    ESCITALOPRAM (LEXAPRO) 10 MG TABLET    take 1 tablet by mouth once daily    ESOMEPRAZOLE (NEXIUM) 40 MG DELAYED RELEASE CAPSULE    1 po daily in the am 30 minutes before breakfast    FEXOFENADINE (ALLEGRA) 180 MG TABLET    Take 1 tablet by mouth daily    FLUTICASONE (FLONASE) 50 MCG/ACT NASAL SPRAY    instill 2 sprays into each nostril once daily.  90 day supply    LACTOBACILLUS (PROBIOTIC ACIDOPHILUS PO)    Take by mouth    LEVOTHYROXINE (SYNTHROID) 25 MCG TABLET    take 1 tablet by mouth once daily    LISINOPRIL (PRINIVIL;ZESTRIL) 40 MG TABLET    Take 1 tablet by mouth daily    METFORMIN (GLUCOPHAGE) 500 MG TABLET    Take 1 tablet by mouth 3 times daily (with meals)    MONTELUKAST (SINGULAIR) 10 MG TABLET    take 1 tablet by mouth at bedtime    NITROGLYCERIN (NITROSTAT) 0.4 MG SL TABLET    Place 1 tablet under the tongue as needed for Chest pain    SUCRALFATE (CARAFATE) 1 GM TABLET    Take 1 tablet by mouth 4 times daily    TRIAMCINOLONE (KENALOG) 0.1 % CREAM           ALLERGIES     Latex, Ampicillin, Codeine, Erythromycin, and Pcn [penicillins]    FAMILY HISTORY       Family History   Problem Relation Age of Onset    Diabetes Mother     Heart Disease Mother         MI    Stroke Mother     Heart Disease Father         MI    Stroke Father     Diabetes Brother     Hypertension Brother     Diabetes Sister     Hypertension Sister     Breast Cancer Sister           SOCIAL HISTORY       Social History     Socioeconomic History    Marital status:      Spouse name: None    Number of children: None    Years of education: None    Highest education level: None   Tobacco Use    Smoking status: Never    Smokeless tobacco: Never   Substance and Sexual Activity    Alcohol use: No    Drug use: No    Sexual activity: Not Currently     Social Determinants of Health     Financial Resource Strain: Low Risk     Difficulty of Paying Living Expenses: Not hard at all   Food Insecurity: No Food Insecurity    Worried About Running Out of Food in the Last Year: Never true    Ran Out of Food in the Last Year: Never true         PHYSICAL EXAM    (up to 7 for level 4, 8 or more for level 5)     ED Triage Vitals   BP Temp Temp src Pulse Resp SpO2 Height Weight   -- -- -- -- -- -- -- --       Physical Exam  General :Patient is awake, alert, oriented, in no acute distress, nontoxic appearing  HEENT: Pupils are equally round and reactive to light, EOMI  Cardiac: Heart regular rate, rhythm, no murmurs, rubs, or gallops  Lungs: Lungs are clear to auscultation, there is no wheezing, rhonchi, or rales. There is no use of accessory muscles. Chest wall: There is no tenderness to palpation over the chest wall or over ribs  Abdomen: Abdomen is soft, nontender, nondistended. There is no firm or pulsatile masses, no rebound rigidity or guarding. Musculoskeletal: 5 out of 5 strength in all 4 extremities.   No focal muscle deficits are appreciated  Neuro: Motor intact, sensory intact, level of consciousness is normal  Dermatology: Skin is warm and dry  Psych: Mentation is grossly normal, cognition is grossly normal. Affect is appropriate. DIAGNOSTIC RESULTS     EKG: All EKG's are interpreted by the Emergency Department Physician who either signs or Co-signs this chart in the [address] a cardiologist.    The EKG interpreted by me shows sinus rhythm. Prolonged QT interval.  Heart rate is 78 bpm, FL interval is 166 ms, QRS durations 104 ms, QT is 458 and QTC is 523 ms. No acute ST depressions concerning for acute myocardial ischemia. No ST elevations concerning for acute myocardial infarction. RADIOLOGY:   Non-plain film images such as CT, Ultrasound and MRI are read by the radiologist. Plain radiographic images are visualized and preliminarily interpreted by the emergency physician with the below findings:      [] Radiologist's Report Reviewed:  XR CHEST PORTABLE   Final Result   Impression: No acute cardiopulmonary abnormality.             ED BEDSIDE ULTRASOUND:   Performed by ED Physician - none    LABS:    I have reviewed and interpreted all of the currently available lab results from this visit (ifapplicable):  Results for orders placed or performed during the hospital encounter of 08/25/22   CBC with Auto Differential   Result Value Ref Range    WBC 7.0 4.0 - 11.0 K/uL    RBC 4.25 3.80 - 5.80 M/uL    Hemoglobin 11.9 11.5 - 16.5 g/dL    Hematocrit 37.7 37.0 - 47.0 %    MCV 88.7 80.0 - 100.0 fL    MCH 28.0 27.0 - 32.0 pg    MCHC 31.6 31.0 - 35.0 g/dL    RDW 13.2 11.0 - 16.0 %    Platelets 678 065 - 352 K/uL    MPV 10.9 (H) 6.0 - 10.0 fL    Neutrophils % 72.2 %    Immature Granulocytes % 0.4 0.0 - 5.0 %    Lymphocytes % 18.1 %    Monocytes % 7.6 %    Eosinophils % 1.3 %    Basophils % 0.4 %    Neutrophils Absolute 5.0 2.0 - 7.5 K/uL    Immature Granulocytes # 0.0 K/uL    Lymphocytes Absolute 1.3 (L) 1.5 - 4.0 K/uL    Monocytes Absolute 0.5 0.2 - 0.8 K/uL Eosinophils Absolute 0.1 0.0 - 0.4 K/uL    Basophils Absolute 0.0 0.0 - 0.1 K/uL   Comprehensive Metabolic Panel w/ Reflex to MG   Result Value Ref Range    Sodium 138 136 - 145 mmol/L    Potassium reflex Magnesium 3.7 3.4 - 5.1 mmol/L    Chloride 103 98 - 107 mmol/L    CO2 23 20 - 30 mmol/L    Anion Gap 12 3 - 16    Glucose 217 (H) 74 - 106 mg/dL    BUN 18 6 - 20 mg/dL    Creatinine 0.9 0.4 - 1.2 mg/dL    GFR Non-African American >60 >59    GFR African American >59 >59    Calcium 9.3 8.5 - 10.5 mg/dL    Total Protein 7.2 6.4 - 8.3 g/dL    Albumin 4.4 3.4 - 4.8 g/dL    Albumin/Globulin Ratio 1.6 0.8 - 2.0    Total Bilirubin 0.5 0.3 - 1.2 mg/dL    Alkaline Phosphatase 167 (H) 25 - 100 U/L    ALT 17 4 - 36 U/L    AST 16 8 - 33 U/L    Globulin 2.8 Not Established g/dL   Troponin   Result Value Ref Range    Troponin <0.30 <0.30 ng/mL   POCT Glucose   Result Value Ref Range    Glucose 186 mg/dL        All other labs were within normal range or not returned as of this dictation. EMERGENCY DEPARTMENT COURSE and DIFFERENTIAL DIAGNOSIS/MDM:   Vitals:    Vitals:    08/25/22 1105 08/25/22 1117 08/25/22 1118 08/25/22 1158   BP: (!) 198/79  (!) 183/69 (!) 182/76   Pulse: 74  69 68   Resp: 19  14 17   Temp: 97.6 °F (36.4 °C)      SpO2: 97%  98% 98%   Weight:  157 lb (71.2 kg)     Height:  5' 2\" (1.575 m)         MEDICATIONS ADMINISTERED IN ED:  Medications   0.9 % sodium chloride bolus (1,000 mLs IntraVENous New Bag 8/25/22 1155)       After initial evaluation examination I did have conversation with the patient about the upcoming plan, treatment possible disposition which they are agreeable to the times dictation. Patient advised that we will give her a fluid bolus normal saline 1 L. We will also check a CBC, CMP, troponin and a twelve-lead EKG. She will also have a portable chest radiograph performed to rule out any acute abnormality.   The patient's symptoms have almost completely resolved prior to she got to the emergency department. I do believe this is most likely secondary to her being involved with the Jessica Pacheco and receiving the medication through the IV. We will also perform a fingerstick glucose since she is diabetic and she had to fast this morning. Patient also have a fingerstick glucose performed. Her final disposition will determine once her radiological diagnostic studies been performed reviewed. Patient's resting, stretcher no acute distress nontoxic. She is actually up walking around the department now without any difficulty. Blood work showed 7000, hemoglobin is 11.9, hematocrit 37.7, platelet counts 625. Comprehensive metabolic panel was benign except for glucose of 217 mildly elevated. Alkaline phosphatase is 167 mildly elevated. Troponin was nondetectable less than 0.30. Glucose was 186 on fingerstick. Portable chest radiograph read by radiology as no acute cardiopulmonary abnormality    Patient's radiological diagnostic studies were discussed with her and she does state understanding. Patient advised of her evaluation here being benign. Again I do believe this reaction was most likely secondary to the medication she received for the Lexiscan especially with the symptoms feeling like her joints were going to pop and she got hot flushed and felt like she was going to pass out almost like a vasovagal type presentation. But otherwise symptoms have completely resolved at this time. I do believe she still needs to follow-up with Dr. Jorge Muñoz her cardiologist within the next couple of days just for reevaluation to see if they are going to continue with the Jessica Pacheco if they will do some other type of diagnostic evaluation or procedure. Otherwise I do believe the patient's is stable and safe to be discharged home. Patient's presentation did not seem consistent with cardiac. The patient vies they do need to follow-up with a regular family physician within the next 1 to 2 days for evaluation. They are also given instruction of the symptoms worsens or new symptoms arise they should return back to emergency department for further evaluation work-up. Is this patient to be included in the SEP-1 Core Measure due to severe sepsis or septic shock? No   Exclusion criteria - the patient is NOT to be included for SEP-1 Core Measure due to: Infection is not suspected          CONSULTS:  None    PROCEDURES:  Procedures    CRITICAL CARE TIME    Total Critical Care time was 0 minutes, excluding separately reportable procedures. There was a high probability of clinically significant/life threatening deterioration in the patient's condition which required my urgent intervention. FINAL IMPRESSION      1. Allergic reaction to drug, initial encounter    2. Essential hypertension          DISPOSITION/PLAN   DISPOSITION Decision To Discharge 08/25/2022 12:23:46 PM      PATIENT REFERRED TO:  Robert Dacosta, APRN  [address] [facility]  [phone]    In 2 days      [facility] Emergency Department  [facility]  [address] [address],[facility] [address] [address]  [phone]    As needed, If symptoms worsen    Asim Maxwell MD  [address]  [phone]    Schedule an appointment as soon as possible for a visit in 1 week      DISCHARGE MEDICATIONS:  New Prescriptions    No medications on file       Comment: Please note this report has been produced using speech recognition software and may contain errorsrelated to that system including errors in grammar, punctuation, and spelling, as well as words and phrases that may be inappropriate. If there are any questions or concerns please feel free to contact the dictating providerfor clarification.     Ellen Truong DO  Attending Emergency Physician               Ellen Truong DO  08/25/22 2774

## 2022-08-26 ENCOUNTER — OUTSIDE FACILITY SERVICE (OUTPATIENT)
Dept: CARDIOLOGY | Facility: CLINIC | Age: 77
End: 2022-08-26

## 2022-08-26 PROCEDURE — 93018 CV STRESS TEST I&R ONLY: CPT | Performed by: INTERNAL MEDICINE

## 2022-08-26 PROCEDURE — 78452 HT MUSCLE IMAGE SPECT MULT: CPT | Performed by: INTERNAL MEDICINE

## 2022-09-22 ENCOUNTER — OFFICE VISIT (OUTPATIENT)
Dept: CARDIOLOGY | Facility: CLINIC | Age: 77
End: 2022-09-22

## 2022-09-22 VITALS
BODY MASS INDEX: 29.08 KG/M2 | HEART RATE: 71 BPM | DIASTOLIC BLOOD PRESSURE: 70 MMHG | OXYGEN SATURATION: 95 % | SYSTOLIC BLOOD PRESSURE: 172 MMHG | HEIGHT: 62 IN | WEIGHT: 158 LBS

## 2022-09-22 DIAGNOSIS — E78.2 MIXED HYPERLIPIDEMIA: ICD-10-CM

## 2022-09-22 DIAGNOSIS — I10 ESSENTIAL HYPERTENSION: Primary | ICD-10-CM

## 2022-09-22 PROCEDURE — 99214 OFFICE O/P EST MOD 30 MIN: CPT | Performed by: INTERNAL MEDICINE

## 2022-09-22 RX ORDER — SACUBITRIL AND VALSARTAN 49; 51 MG/1; MG/1
1 TABLET, FILM COATED ORAL 2 TIMES DAILY
Qty: 180 TABLET | Refills: 1 | Status: SHIPPED | OUTPATIENT
Start: 2022-09-22 | End: 2022-11-02 | Stop reason: DRUGHIGH

## 2022-09-22 RX ORDER — LISINOPRIL 40 MG/1
40 TABLET ORAL DAILY
COMMUNITY
End: 2022-09-22

## 2022-10-07 ENCOUNTER — TELEPHONE (OUTPATIENT)
Dept: PRIMARY CARE CLINIC | Age: 77
End: 2022-10-07

## 2022-10-07 NOTE — TELEPHONE ENCOUNTER
----- Message from Corey Wyman sent at 10/7/2022 11:37 AM EDT -----  Subject: Message to Provider    QUESTIONS  Information for Provider? Pt has elizabeth with Kasey Harry on 10/19/22 and is   wanted to know if Dr Desi Price would like for Pt to have blood labs done   before her elizabeth. Please call to inform PT  ---------------------------------------------------------------------------  --------------  Seferino Carlin INFO  [phone]; OK to leave message on voicemail  ---------------------------------------------------------------------------  --------------  SCRIPT ANSWERS  Relationship to Patient?  Self

## 2022-10-17 ENCOUNTER — HOSPITAL ENCOUNTER (OUTPATIENT)
Dept: MAMMOGRAPHY | Facility: HOSPITAL | Age: 77
Discharge: HOME | End: 2022-10-17
Payer: MEDICARE

## 2022-10-17 VITALS — WEIGHT: 157 LBS | BODY MASS INDEX: 28.72 KG/M2

## 2022-10-17 DIAGNOSIS — Z12.31 BREAST CANCER SCREENING BY MAMMOGRAM: ICD-10-CM

## 2022-10-17 PROCEDURE — 77063 BREAST TOMOSYNTHESIS BI: CPT

## 2022-10-19 ENCOUNTER — OFFICE VISIT (OUTPATIENT)
Dept: PRIMARY CARE CLINIC | Age: 77
End: 2022-10-19
Payer: MEDICARE

## 2022-10-19 VITALS
DIASTOLIC BLOOD PRESSURE: 86 MMHG | RESPIRATION RATE: 16 BRPM | HEART RATE: 81 BPM | SYSTOLIC BLOOD PRESSURE: 160 MMHG | HEIGHT: 62 IN | BODY MASS INDEX: 28.34 KG/M2 | TEMPERATURE: 97.5 F | WEIGHT: 154 LBS | OXYGEN SATURATION: 98 %

## 2022-10-19 DIAGNOSIS — Z23 NEED FOR INFLUENZA VACCINATION: ICD-10-CM

## 2022-10-19 DIAGNOSIS — M81.0 OSTEOPOROSIS, UNSPECIFIED OSTEOPOROSIS TYPE, UNSPECIFIED PATHOLOGICAL FRACTURE PRESENCE: ICD-10-CM

## 2022-10-19 DIAGNOSIS — J30.9 ALLERGIC RHINITIS, UNSPECIFIED SEASONALITY, UNSPECIFIED TRIGGER: ICD-10-CM

## 2022-10-19 DIAGNOSIS — E03.9 ACQUIRED HYPOTHYROIDISM: ICD-10-CM

## 2022-10-19 DIAGNOSIS — K21.9 GASTROESOPHAGEAL REFLUX DISEASE, UNSPECIFIED WHETHER ESOPHAGITIS PRESENT: ICD-10-CM

## 2022-10-19 DIAGNOSIS — E55.9 VITAMIN D DEFICIENCY: ICD-10-CM

## 2022-10-19 DIAGNOSIS — E78.00 PURE HYPERCHOLESTEROLEMIA: ICD-10-CM

## 2022-10-19 DIAGNOSIS — E11.9 TYPE 2 DIABETES MELLITUS WITHOUT COMPLICATION, WITHOUT LONG-TERM CURRENT USE OF INSULIN (HCC): Primary | ICD-10-CM

## 2022-10-19 DIAGNOSIS — R41.3 MEMORY LOSS: ICD-10-CM

## 2022-10-19 DIAGNOSIS — F32.A DEPRESSION, UNSPECIFIED DEPRESSION TYPE: ICD-10-CM

## 2022-10-19 PROCEDURE — G8417 CALC BMI ABV UP PARAM F/U: HCPCS | Performed by: NURSE PRACTITIONER

## 2022-10-19 PROCEDURE — 3074F SYST BP LT 130 MM HG: CPT | Performed by: NURSE PRACTITIONER

## 2022-10-19 PROCEDURE — G8399 PT W/DXA RESULTS DOCUMENT: HCPCS | Performed by: NURSE PRACTITIONER

## 2022-10-19 PROCEDURE — G0008 ADMIN INFLUENZA VIRUS VAC: HCPCS | Performed by: NURSE PRACTITIONER

## 2022-10-19 PROCEDURE — 1036F TOBACCO NON-USER: CPT | Performed by: NURSE PRACTITIONER

## 2022-10-19 PROCEDURE — 3044F HG A1C LEVEL LT 7.0%: CPT | Performed by: NURSE PRACTITIONER

## 2022-10-19 PROCEDURE — 1090F PRES/ABSN URINE INCON ASSESS: CPT | Performed by: NURSE PRACTITIONER

## 2022-10-19 PROCEDURE — 3017F COLORECTAL CA SCREEN DOC REV: CPT | Performed by: NURSE PRACTITIONER

## 2022-10-19 PROCEDURE — G8427 DOCREV CUR MEDS BY ELIG CLIN: HCPCS | Performed by: NURSE PRACTITIONER

## 2022-10-19 PROCEDURE — 2022F DILAT RTA XM EVC RTNOPTHY: CPT | Performed by: NURSE PRACTITIONER

## 2022-10-19 PROCEDURE — 99214 OFFICE O/P EST MOD 30 MIN: CPT | Performed by: NURSE PRACTITIONER

## 2022-10-19 PROCEDURE — 1123F ACP DISCUSS/DSCN MKR DOCD: CPT | Performed by: NURSE PRACTITIONER

## 2022-10-19 PROCEDURE — G8484 FLU IMMUNIZE NO ADMIN: HCPCS | Performed by: NURSE PRACTITIONER

## 2022-10-19 PROCEDURE — 90694 VACC AIIV4 NO PRSRV 0.5ML IM: CPT | Performed by: NURSE PRACTITIONER

## 2022-10-19 PROCEDURE — 3078F DIAST BP <80 MM HG: CPT | Performed by: NURSE PRACTITIONER

## 2022-10-19 RX ORDER — CLONIDINE HYDROCHLORIDE 0.1 MG/1
0.1 TABLET ORAL DAILY PRN
Qty: 60 TABLET | Refills: 3 | Status: SHIPPED | OUTPATIENT
Start: 2022-10-19 | End: 2022-11-18

## 2022-10-19 RX ORDER — FLUTICASONE PROPIONATE 50 MCG
SPRAY, SUSPENSION (ML) NASAL
Qty: 16 G | Refills: 3 | Status: SHIPPED | OUTPATIENT
Start: 2022-10-19

## 2022-10-19 RX ORDER — DONEPEZIL HYDROCHLORIDE 10 MG/1
TABLET, FILM COATED ORAL
Qty: 90 TABLET | Refills: 1 | Status: SHIPPED | OUTPATIENT
Start: 2022-10-19

## 2022-10-19 RX ORDER — SACUBITRIL AND VALSARTAN 49; 51 MG/1; MG/1
1 TABLET, FILM COATED ORAL 2 TIMES DAILY
Qty: 180 TABLET | Refills: 1 | Status: SHIPPED | OUTPATIENT
Start: 2022-10-19

## 2022-10-19 RX ORDER — CARBOXYMETHYLCELLULOSE SODIUM 5 MG/ML
1 SOLUTION/ DROPS OPHTHALMIC DAILY PRN
Qty: 1 EACH | Refills: 3 | Status: SHIPPED | OUTPATIENT
Start: 2022-10-19

## 2022-10-19 RX ORDER — ESCITALOPRAM OXALATE 10 MG/1
TABLET ORAL
Qty: 90 TABLET | Refills: 3 | Status: SHIPPED | OUTPATIENT
Start: 2022-10-19

## 2022-10-19 RX ORDER — ATORVASTATIN CALCIUM 20 MG/1
20 TABLET, FILM COATED ORAL DAILY
Qty: 90 TABLET | Refills: 3 | Status: SHIPPED | OUTPATIENT
Start: 2022-10-19

## 2022-10-19 RX ORDER — ERGOCALCIFEROL (VITAMIN D2) 1250 MCG
50000 CAPSULE ORAL WEEKLY
Qty: 12 CAPSULE | Refills: 3 | Status: SHIPPED | OUTPATIENT
Start: 2022-10-19

## 2022-10-19 RX ORDER — SACUBITRIL AND VALSARTAN 49; 51 MG/1; MG/1
1 TABLET, FILM COATED ORAL 2 TIMES DAILY
COMMUNITY
Start: 2022-09-22 | End: 2022-10-19 | Stop reason: SDUPTHER

## 2022-10-19 RX ORDER — MONTELUKAST SODIUM 10 MG/1
TABLET ORAL
Qty: 90 TABLET | Refills: 3 | Status: SHIPPED | OUTPATIENT
Start: 2022-10-19

## 2022-10-19 RX ORDER — ALENDRONATE SODIUM 70 MG/1
TABLET ORAL
Qty: 12 TABLET | Refills: 3 | Status: SHIPPED | OUTPATIENT
Start: 2022-10-19

## 2022-10-19 RX ORDER — ESOMEPRAZOLE MAGNESIUM 40 MG/1
CAPSULE, DELAYED RELEASE ORAL
Qty: 90 CAPSULE | Refills: 3 | Status: SHIPPED | OUTPATIENT
Start: 2022-10-19

## 2022-10-19 RX ORDER — FEXOFENADINE HCL 180 MG/1
180 TABLET ORAL DAILY
Qty: 90 TABLET | Refills: 3 | Status: SHIPPED | OUTPATIENT
Start: 2022-10-19

## 2022-10-19 RX ORDER — LEVOTHYROXINE SODIUM 0.03 MG/1
TABLET ORAL
Qty: 90 TABLET | Refills: 3 | Status: SHIPPED | OUTPATIENT
Start: 2022-10-19

## 2022-10-19 SDOH — ECONOMIC STABILITY: FOOD INSECURITY: WITHIN THE PAST 12 MONTHS, THE FOOD YOU BOUGHT JUST DIDN'T LAST AND YOU DIDN'T HAVE MONEY TO GET MORE.: NEVER TRUE

## 2022-10-19 SDOH — ECONOMIC STABILITY: FOOD INSECURITY: WITHIN THE PAST 12 MONTHS, YOU WORRIED THAT YOUR FOOD WOULD RUN OUT BEFORE YOU GOT MONEY TO BUY MORE.: NEVER TRUE

## 2022-10-19 ASSESSMENT — ENCOUNTER SYMPTOMS
GASTROINTESTINAL NEGATIVE: 1
EYES NEGATIVE: 1
ALLERGIC/IMMUNOLOGIC NEGATIVE: 1
RESPIRATORY NEGATIVE: 1

## 2022-10-19 ASSESSMENT — SOCIAL DETERMINANTS OF HEALTH (SDOH): HOW HARD IS IT FOR YOU TO PAY FOR THE VERY BASICS LIKE FOOD, HOUSING, MEDICAL CARE, AND HEATING?: NOT HARD AT ALL

## 2022-10-19 NOTE — PATIENT INSTRUCTIONS

## 2022-10-19 NOTE — PROGRESS NOTES
Chief Complaint   Patient presents with    Diabetes    Hypertension       Have you seen any other physician or provider since your last visit yes - cardiology,labs     Have you had any other diagnostic tests since your last visit? yes - mammogram ,stress test     Have you changed or stopped any medications since your last visit? yes - started entresto and stopped Lisinopril,Norvasc, and atenolol. Vaccine Information Sheet, \"Influenza - Inactivated\"  given to Darwin Arriola, or parent/legal guardian of  Darwin Arriola and verbalized understanding. Patient responses:    Have you ever had a reaction to a flu vaccine? No  Do you have any current illness? No  Have you ever had Guillian Riddle Syndrome? No  Do you have a serious allergy to any of the follow: Neomycin, Polymyxin, Thimerosal, eggs or egg products? No    Flu vaccine given per order. Please see immunization tab. Risks and benefits explained. Current VIS given. Immunizations Administered       Name Date Dose Route    Influenza, FLUAD, (age 72 y+), Adjuvanted, 0.5mL 10/19/2022 0.5 mL Intramuscular    Site: Deltoid- Left    Lot: [id number]    NDC: 83182-578-80               SUBJECTIVE:    Patient Dior Garcia is a 78 y.o. female. Chief Complaint   Patient presents with    Diabetes    Hypertension       HPI:  Patient has had hypertension for several years. She has been compliant with taking new medications, without side effects from it. She has been following a low-sodium, is active and rarely exercises. Weight is fluctuating a bit, compared to last visit. Her blood pressure is elevated 162/96 at this time. She has only been on the Darl Duff for less than 2 weeks. Patient has had diabetes for the past several years. She has been compliant with the medications and denies any side effects from it. She has been monitoring fingersticks on a daily basis. Her fingerstick range is between 115-117. She denies any hypoglycemic symptoms. She has been following a diabetic diet and has been active. Her last eye exam was less than a year ago. She is using oral meds only. Patient's medications, allergies, past medical, surgical, social and family histories were reviewed and updated as appropriate. Review of Systems   Constitutional: Negative. HENT: Negative. Eyes: Negative. Respiratory: Negative. Cardiovascular: Negative. Gastrointestinal: Negative. Endocrine: Negative. Genitourinary: Negative. Musculoskeletal: Negative. Skin: Negative. Allergic/Immunologic: Negative. Neurological: Negative. Hematological: Negative. Psychiatric/Behavioral: Negative. OBJECTIVE:  BP (!) 160/86 (Site: Right Upper Arm, Position: Sitting, Cuff Size: Medium Adult)   Pulse 81   Temp 97.5 °F (36.4 °C) (Temporal)   Resp 16   Ht 5' 2\" (1.575 m)   Wt 154 lb (69.9 kg)   SpO2 98% Comment: room air  BMI 28.17 kg/m²    Physical Exam  Vitals and nursing note reviewed. Constitutional:       Appearance: She is well-developed. HENT:      Head: Normocephalic and atraumatic. Eyes:      Conjunctiva/sclera: Conjunctivae normal.      Pupils: Pupils are equal, round, and reactive to light. Neck:      Thyroid: No thyromegaly. Vascular: No JVD. Cardiovascular:      Rate and Rhythm: Normal rate and regular rhythm. Heart sounds: No murmur heard. No friction rub. No gallop. Pulmonary:      Effort: Pulmonary effort is normal. No respiratory distress. Breath sounds: Normal breath sounds. No wheezing or rales. Abdominal:      General: Bowel sounds are normal. There is no distension. Palpations: Abdomen is soft. Tenderness: There is no abdominal tenderness. There is no guarding. Musculoskeletal:         General: No tenderness. Cervical back: Normal range of motion and neck supple. Skin:     General: Skin is warm and dry. Findings: No rash.    Neurological:      Mental Status: She is alert and oriented to person, place, and time. Psychiatric:         Judgment: Judgment normal.       No results found for requested labs within last 30 days. Hemoglobin A1C (%)   Date Value   06/23/2022 6.6 (H)     Microalbumin, Random Urine (mg/dL)   Date Value   06/23/2022 6.20     LDL Calculated (mg/dL)   Date Value   01/03/2022 67         Lab Results   Component Value Date/Time    WBC 7.0 08/25/2022 11:14 AM    NEUTROABS 5.0 08/25/2022 11:14 AM    HGB 11.9 08/25/2022 11:14 AM    HCT 37.7 08/25/2022 11:14 AM    MCV 88.7 08/25/2022 11:14 AM     08/25/2022 11:14 AM       Lab Results   Component Value Date    TSH 2.87 06/23/2022         ASSESSMENT/PLAN:     1. Type 2 diabetes mellitus without complication, without long-term current use of insulin (HCC)  Stable. I advised her regarding diabetic diet, exercise and weight control. Also, I advised her to stay on the current medication, monitor her fingerstick closely. I am going to check the A1c every few months. I will check microalbumin on a yearly basis. I have also advised her to have a yearly eye exam and to monitor her feet closely. Along with instruction of possible complications related to diabetes, even with good control. A1C will be checked  in few months. Lab Results   Component Value Date    LABA1C 6.6 (H) 06/23/2022       - metFORMIN (GLUCOPHAGE) 500 MG tablet; Take 1 tablet by mouth 3 times daily (with meals)  Dispense: 90 tablet; Refill: 2  - Hemoglobin A1C; Future    2. Osteoporosis, unspecified osteoporosis type, unspecified pathological fracture presence    - alendronate (FOSAMAX) 70 MG tablet; take 1 tablet by mouth every week  Dispense: 12 tablet; Refill: 3    3. Acquired hypothyroidism    - levothyroxine (SYNTHROID) 25 MCG tablet; take 1 tablet by mouth once daily  Dispense: 90 tablet; Refill: 3    4.  Gastroesophageal reflux disease, unspecified whether esophagitis present    - esomeprazole (NEXIUM) 40 MG delayed release capsule; 1 po daily in the am 30 minutes before breakfast  Dispense: 90 capsule; Refill: 3    5. Memory loss    - donepezil (ARICEPT) 10 MG tablet; TAKE ONE TABLET BY MOUTH EVERY DAY AT NIGHT  Dispense: 90 tablet; Refill: 1    6. Vitamin D deficiency    - ergocalciferol (DRISDOL) 1.25 MG (79105 UT) capsule; Take 1 capsule by mouth once a week 90 day supply  Dispense: 12 capsule; Refill: 3  - Vitamin B12 & Folate; Future  - Vitamin D 25 Hydroxy; Future    7. Depression, unspecified depression type    - escitalopram (LEXAPRO) 10 MG tablet; take 1 tablet by mouth once daily  Dispense: 90 tablet; Refill: 3    8. Allergic rhinitis, unspecified seasonality, unspecified trigger    - fexofenadine (ALLEGRA) 180 MG tablet; Take 1 tablet by mouth daily  Dispense: 90 tablet; Refill: 3  - fluticasone (FLONASE) 50 MCG/ACT nasal spray; instill 2 sprays into each nostril once daily. 90 day supply  Dispense: 16 g; Refill: 3  - TSH; Future  - Comprehensive Metabolic Panel; Future  - CBC; Future    9. Pure hypercholesterolemia    - atorvastatin (LIPITOR) 20 MG tablet; Take 1 tablet by mouth daily  Dispense: 90 tablet; Refill: 3  - Lipid Panel; Future    10.  Need for influenza vaccination    - Influenza, FLUAD, (age 72 y+), IM, Preservative Free, 0.5 mL     Written by Gonzella Goodpasture Niece RMA, acting as a scribe for Fabiola Pope on 10/19/2022 at 10:27 PM.

## 2022-11-01 ENCOUNTER — TELEPHONE (OUTPATIENT)
Dept: PRIMARY CARE CLINIC | Age: 77
End: 2022-11-01

## 2022-11-01 ENCOUNTER — TELEPHONE (OUTPATIENT)
Dept: CARDIOLOGY | Facility: CLINIC | Age: 77
End: 2022-11-01

## 2022-11-01 NOTE — TELEPHONE ENCOUNTER
Pt called stating that she can not take the clonidine. Reports that it caused \"dizziness, pain run down her lt arm, light headedness, and her heart jumped\" right after she took it.  Asking for advice

## 2022-11-02 RX ORDER — SACUBITRIL AND VALSARTAN 97; 103 MG/1; MG/1
1 TABLET, FILM COATED ORAL 2 TIMES DAILY
Qty: 180 TABLET | Refills: 1 | Status: SHIPPED | OUTPATIENT
Start: 2022-11-02

## 2022-11-10 NOTE — TELEPHONE ENCOUNTER
Pt called back V/U and states that her cardiologist increased her Entresto.  Has not been checking blood pressure r/t she needs to get new machine

## 2022-11-14 ENCOUNTER — HOSPITAL ENCOUNTER (OUTPATIENT)
Facility: HOSPITAL | Age: 77
Discharge: HOME | End: 2022-11-14
Payer: MEDICARE

## 2022-11-14 DIAGNOSIS — E11.9 TYPE 2 DIABETES MELLITUS WITHOUT COMPLICATION, WITHOUT LONG-TERM CURRENT USE OF INSULIN (HCC): ICD-10-CM

## 2022-11-14 DIAGNOSIS — J30.9 ALLERGIC RHINITIS, UNSPECIFIED SEASONALITY, UNSPECIFIED TRIGGER: ICD-10-CM

## 2022-11-14 DIAGNOSIS — E78.00 PURE HYPERCHOLESTEROLEMIA: ICD-10-CM

## 2022-11-14 DIAGNOSIS — E55.9 VITAMIN D DEFICIENCY: ICD-10-CM

## 2022-11-14 LAB
A/G RATIO: 1.8 (ref 0.8–2)
ALBUMIN SERPL-MCNC: 4.2 G/DL (ref 3.4–4.8)
ALP BLD-CCNC: 147 U/L (ref 25–100)
ALT SERPL-CCNC: 21 U/L (ref 4–36)
ANION GAP SERPL CALCULATED.3IONS-SCNC: 9 MMOL/L (ref 3–16)
AST SERPL-CCNC: 18 U/L (ref 8–33)
BILIRUB SERPL-MCNC: 0.4 MG/DL (ref 0.3–1.2)
BUN BLDV-MCNC: 18 MG/DL (ref 6–20)
CALCIUM SERPL-MCNC: 9.3 MG/DL (ref 8.5–10.5)
CHLORIDE BLD-SCNC: 102 MMOL/L (ref 98–107)
CHOLESTEROL, TOTAL: 164 MG/DL (ref 0–200)
CO2: 27 MMOL/L (ref 20–30)
CREAT SERPL-MCNC: 1 MG/DL (ref 0.4–1.2)
FOLATE: 9.08 NG/ML
GFR SERPL CREATININE-BSD FRML MDRD: 58 ML/MIN/{1.73_M2}
GLOBULIN: 2.3 G/DL
GLUCOSE BLD-MCNC: 118 MG/DL (ref 74–106)
HBA1C MFR BLD: 6.8 %
HCT VFR BLD CALC: 35.6 % (ref 37–47)
HDLC SERPL-MCNC: 54 MG/DL (ref 40–60)
HEMOGLOBIN: 11.3 G/DL (ref 11.5–16.5)
LDL CHOLESTEROL CALCULATED: 77 MG/DL
MCH RBC QN AUTO: 28.1 PG (ref 27–32)
MCHC RBC AUTO-ENTMCNC: 31.7 G/DL (ref 31–35)
MCV RBC AUTO: 88.6 FL (ref 80–100)
PDW BLD-RTO: 13.2 % (ref 11–16)
PLATELET # BLD: 248 K/UL (ref 150–400)
PMV BLD AUTO: 11 FL (ref 6–10)
POTASSIUM SERPL-SCNC: 4.5 MMOL/L (ref 3.4–5.1)
RBC # BLD: 4.02 M/UL (ref 3.8–5.8)
SODIUM BLD-SCNC: 138 MMOL/L (ref 136–145)
TOTAL PROTEIN: 6.5 G/DL (ref 6.4–8.3)
TRIGL SERPL-MCNC: 164 MG/DL (ref 0–249)
TSH SERPL DL<=0.05 MIU/L-ACNC: 5.2 UIU/ML (ref 0.27–4.2)
VITAMIN B-12: 1205 PG/ML (ref 211–911)
VITAMIN D 25-HYDROXY: 20.1 (ref 32–100)
VLDLC SERPL CALC-MCNC: 33 MG/DL
WBC # BLD: 6 K/UL (ref 4–11)

## 2022-11-14 PROCEDURE — 82746 ASSAY OF FOLIC ACID SERUM: CPT

## 2022-11-14 PROCEDURE — 85027 COMPLETE CBC AUTOMATED: CPT

## 2022-11-14 PROCEDURE — 83036 HEMOGLOBIN GLYCOSYLATED A1C: CPT

## 2022-11-14 PROCEDURE — 82607 VITAMIN B-12: CPT

## 2022-11-14 PROCEDURE — 82306 VITAMIN D 25 HYDROXY: CPT

## 2022-11-14 PROCEDURE — 84443 ASSAY THYROID STIM HORMONE: CPT

## 2022-11-14 PROCEDURE — 80061 LIPID PANEL: CPT

## 2022-11-14 PROCEDURE — 80053 COMPREHEN METABOLIC PANEL: CPT

## 2022-12-06 ENCOUNTER — OFFICE VISIT (OUTPATIENT)
Dept: PRIMARY CARE CLINIC | Age: 77
End: 2022-12-06
Payer: MEDICARE

## 2022-12-06 VITALS
OXYGEN SATURATION: 99 % | HEIGHT: 62 IN | BODY MASS INDEX: 29.08 KG/M2 | HEART RATE: 67 BPM | WEIGHT: 158 LBS | TEMPERATURE: 97.5 F | DIASTOLIC BLOOD PRESSURE: 89 MMHG | RESPIRATION RATE: 14 BRPM | SYSTOLIC BLOOD PRESSURE: 136 MMHG

## 2022-12-06 DIAGNOSIS — E78.00 PURE HYPERCHOLESTEROLEMIA: ICD-10-CM

## 2022-12-06 DIAGNOSIS — Z13.29 THYROID DISORDER SCREEN: ICD-10-CM

## 2022-12-06 DIAGNOSIS — J01.90 ACUTE NON-RECURRENT SINUSITIS, UNSPECIFIED LOCATION: ICD-10-CM

## 2022-12-06 DIAGNOSIS — E11.9 TYPE 2 DIABETES MELLITUS WITHOUT COMPLICATION, WITHOUT LONG-TERM CURRENT USE OF INSULIN (HCC): Primary | ICD-10-CM

## 2022-12-06 DIAGNOSIS — E03.9 ACQUIRED HYPOTHYROIDISM: ICD-10-CM

## 2022-12-06 DIAGNOSIS — E55.9 VITAMIN D DEFICIENCY: ICD-10-CM

## 2022-12-06 PROCEDURE — G8484 FLU IMMUNIZE NO ADMIN: HCPCS | Performed by: NURSE PRACTITIONER

## 2022-12-06 PROCEDURE — 3017F COLORECTAL CA SCREEN DOC REV: CPT | Performed by: NURSE PRACTITIONER

## 2022-12-06 PROCEDURE — G8417 CALC BMI ABV UP PARAM F/U: HCPCS | Performed by: NURSE PRACTITIONER

## 2022-12-06 PROCEDURE — 3078F DIAST BP <80 MM HG: CPT | Performed by: NURSE PRACTITIONER

## 2022-12-06 PROCEDURE — 2022F DILAT RTA XM EVC RTNOPTHY: CPT | Performed by: NURSE PRACTITIONER

## 2022-12-06 PROCEDURE — 1090F PRES/ABSN URINE INCON ASSESS: CPT | Performed by: NURSE PRACTITIONER

## 2022-12-06 PROCEDURE — 3044F HG A1C LEVEL LT 7.0%: CPT | Performed by: NURSE PRACTITIONER

## 2022-12-06 PROCEDURE — 1036F TOBACCO NON-USER: CPT | Performed by: NURSE PRACTITIONER

## 2022-12-06 PROCEDURE — G8399 PT W/DXA RESULTS DOCUMENT: HCPCS | Performed by: NURSE PRACTITIONER

## 2022-12-06 PROCEDURE — 3074F SYST BP LT 130 MM HG: CPT | Performed by: NURSE PRACTITIONER

## 2022-12-06 PROCEDURE — 99214 OFFICE O/P EST MOD 30 MIN: CPT | Performed by: NURSE PRACTITIONER

## 2022-12-06 PROCEDURE — 1123F ACP DISCUSS/DSCN MKR DOCD: CPT | Performed by: NURSE PRACTITIONER

## 2022-12-06 PROCEDURE — G8427 DOCREV CUR MEDS BY ELIG CLIN: HCPCS | Performed by: NURSE PRACTITIONER

## 2022-12-06 RX ORDER — CEFDINIR 300 MG/1
300 CAPSULE ORAL 2 TIMES DAILY
Qty: 14 CAPSULE | Refills: 0 | Status: SHIPPED | OUTPATIENT
Start: 2022-12-06 | End: 2022-12-13

## 2022-12-06 RX ORDER — BENZONATATE 200 MG/1
200 CAPSULE ORAL 3 TIMES DAILY PRN
Qty: 30 CAPSULE | Refills: 0 | Status: SHIPPED | OUTPATIENT
Start: 2022-12-06 | End: 2022-12-13

## 2022-12-06 ASSESSMENT — ENCOUNTER SYMPTOMS
COUGH: 1
ALLERGIC/IMMUNOLOGIC NEGATIVE: 1
GASTROINTESTINAL NEGATIVE: 1
SINUS PRESSURE: 1
EYES NEGATIVE: 1

## 2022-12-06 NOTE — PROGRESS NOTES
Chief Complaint   Patient presents with    Diabetes    Hypertension    Discuss Labs       Have you seen any other physician or provider since your last visit no    Have you had any other diagnostic tests since your last visit? yes - labs    Have you changed or stopped any medications since your last visit? no        SUBJECTIVE:    Patient Nicole Harman is a 78 y.o. female. Chief Complaint   Patient presents with    Diabetes    Hypertension    Discuss Labs     HPI:  Patient has had diabetes for the past several years. She has been compliant with the medications and denies any side effects from it. She has been monitoring fingersticks on a daily basis. Her fingerstick range is between 115-129. She denies any hypoglycemic symptoms. She has been following a diabetic diet and has been active. Her last eye exam was less than a year ago. She is using oral meds only. Patient has had hypertension for several years. She has been compliant with taking medications, without side effects from it. She has been following a low-sodium, is active and rarely exercises. Weight is fluctuating a bit, compared to last visit. Her blood pressure is stable 136/89 at this time. Patient reported nasal congestion for the past 6-7 days. Drainage started clear and became yellowish in color. Frontal headache. No fever or chills. Mild dry cough. Sx getting some better. No sick contact. Tried OTC meds with response. Patient's medications, allergies, past medical, surgical, social and family histories were reviewed and updated as appropriate. Review of Systems   Constitutional: Negative. HENT:  Positive for congestion, postnasal drip and sinus pressure. Eyes: Negative. Respiratory:  Positive for cough. Gastrointestinal: Negative. Endocrine: Negative. Genitourinary: Negative. Musculoskeletal: Negative. Skin: Negative. Allergic/Immunologic: Negative. Neurological:  Positive for headaches. Hematological: Negative. Psychiatric/Behavioral: Negative. OBJECTIVE:  /89 (Site: Left Upper Arm, Position: Sitting, Cuff Size: Medium Adult)   Pulse 67   Temp 97.5 °F (36.4 °C) (Temporal)   Resp 14   Ht 5' 2\" (1.575 m)   Wt 158 lb (71.7 kg)   SpO2 99% Comment: room air  BMI 28.90 kg/m²    Physical Exam  Vitals and nursing note reviewed. Constitutional:       Appearance: She is well-developed. HENT:      Head: Normocephalic and atraumatic. Eyes:      Conjunctiva/sclera: Conjunctivae normal.      Pupils: Pupils are equal, round, and reactive to light. Neck:      Thyroid: No thyromegaly. Vascular: No JVD. Cardiovascular:      Rate and Rhythm: Normal rate and regular rhythm. Heart sounds: No murmur heard. No friction rub. No gallop. Pulmonary:      Effort: Pulmonary effort is normal. No respiratory distress. Breath sounds: Normal breath sounds. No wheezing or rales. Abdominal:      General: Bowel sounds are normal. There is no distension. Palpations: Abdomen is soft. Tenderness: There is no abdominal tenderness. There is no guarding. Musculoskeletal:         General: No tenderness. Cervical back: Normal range of motion and neck supple. Skin:     General: Skin is warm and dry. Findings: No rash. Neurological:      Mental Status: She is alert and oriented to person, place, and time. Psychiatric:         Judgment: Judgment normal.       No results found for requested labs within last 30 days.        Hemoglobin A1C (%)   Date Value   11/14/2022 6.8 (H)     Microalbumin, Random Urine (mg/dL)   Date Value   06/23/2022 6.20     LDL Calculated (mg/dL)   Date Value   11/14/2022 77         Lab Results   Component Value Date/Time    WBC 6.0 11/14/2022 08:40 AM    NEUTROABS 5.0 08/25/2022 11:14 AM    HGB 11.3 11/14/2022 08:40 AM    HCT 35.6 11/14/2022 08:40 AM    MCV 88.6 11/14/2022 08:40 AM     11/14/2022 08:40 AM       Lab Results Component Value Date    TSH 5.20 (H) 11/14/2022         ASSESSMENT/PLAN:     1. Type 2 diabetes mellitus without complication, without long-term current use of insulin (HCC)  Continue Metformin 500 mg tid. Hemoglobin A1C   Date Value Ref Range Status   11/14/2022 6.8 (H) See comment % Final     Comment:     Comment:  Diagnosis of Diabetes: > or = 6.5%  Increased risk of diabetes (Prediabetes): 5.7-6.4%  Glycemic Control: Nonpregnant Adults: <7.0%                    Pregnant: <6.0%           - Comprehensive Metabolic Panel; Future  - CBC; Future  - Hemoglobin A1C; Future    2. Acquired hypothyroidism  Synthroid 25 mcg   Lab Results   Component Value Date    TSH 5.20 (H) 11/14/2022    TSHREFLEX 3.46 10/02/2019         3. Acute non-recurrent sinusitis, unspecified location    - cefdinir (OMNICEF) 300 MG capsule; Take 1 capsule by mouth 2 times daily for 7 days  Dispense: 14 capsule; Refill: 0  - benzonatate (TESSALON) 200 MG capsule; Take 1 capsule by mouth 3 times daily as needed for Cough  Dispense: 30 capsule; Refill: 0    4. Pure hypercholesterolemia  Lipitor 20 mg  - Lipid Panel; Future    5. Thyroid disorder screen    - TSH; Future    6. Vitamin D deficiency    - Vitamin B12 & Folate; Future  - Vitamin D 25 Hydroxy;  Future     Written by Jaleel HERZOG, acting as a scribe for Osmany Orlando on 12/6/2022 at 2:46 PM.

## 2023-01-03 DIAGNOSIS — E11.9 TYPE 2 DIABETES MELLITUS WITHOUT COMPLICATION, WITHOUT LONG-TERM CURRENT USE OF INSULIN (HCC): ICD-10-CM

## 2023-01-12 ENCOUNTER — OFFICE VISIT (OUTPATIENT)
Dept: CARDIOLOGY | Facility: CLINIC | Age: 78
End: 2023-01-12
Payer: MEDICARE

## 2023-01-12 VITALS
DIASTOLIC BLOOD PRESSURE: 84 MMHG | OXYGEN SATURATION: 97 % | SYSTOLIC BLOOD PRESSURE: 130 MMHG | WEIGHT: 159.4 LBS | HEART RATE: 87 BPM | BODY MASS INDEX: 29.33 KG/M2 | HEIGHT: 62 IN

## 2023-01-12 DIAGNOSIS — E78.2 MIXED HYPERLIPIDEMIA: ICD-10-CM

## 2023-01-12 DIAGNOSIS — I10 ESSENTIAL HYPERTENSION: Primary | ICD-10-CM

## 2023-01-12 PROCEDURE — 99213 OFFICE O/P EST LOW 20 MIN: CPT | Performed by: INTERNAL MEDICINE

## 2023-01-12 RX ORDER — LISINOPRIL 40 MG/1
1 TABLET ORAL DAILY
COMMUNITY
Start: 2022-06-23 | End: 2023-01-12 | Stop reason: ALTCHOICE

## 2023-03-06 ENCOUNTER — OFFICE VISIT (OUTPATIENT)
Dept: PRIMARY CARE CLINIC | Age: 78
End: 2023-03-06
Payer: MEDICARE

## 2023-03-06 VITALS — OXYGEN SATURATION: 98 % | TEMPERATURE: 98.7 F | HEART RATE: 86 BPM

## 2023-03-06 DIAGNOSIS — Z11.52 ENCOUNTER FOR SCREENING FOR COVID-19: ICD-10-CM

## 2023-03-06 DIAGNOSIS — J01.10 ACUTE FRONTAL SINUSITIS, RECURRENCE NOT SPECIFIED: Primary | ICD-10-CM

## 2023-03-06 DIAGNOSIS — J30.9 ALLERGIC RHINITIS, UNSPECIFIED SEASONALITY, UNSPECIFIED TRIGGER: ICD-10-CM

## 2023-03-06 LAB
Lab: NORMAL
QC PASS/FAIL: NORMAL
SARS-COV-2 RDRP RESP QL NAA+PROBE: NEGATIVE

## 2023-03-06 PROCEDURE — 87635 SARS-COV-2 COVID-19 AMP PRB: CPT | Performed by: NURSE PRACTITIONER

## 2023-03-06 PROCEDURE — G8484 FLU IMMUNIZE NO ADMIN: HCPCS | Performed by: NURSE PRACTITIONER

## 2023-03-06 PROCEDURE — G8417 CALC BMI ABV UP PARAM F/U: HCPCS | Performed by: NURSE PRACTITIONER

## 2023-03-06 PROCEDURE — G8427 DOCREV CUR MEDS BY ELIG CLIN: HCPCS | Performed by: NURSE PRACTITIONER

## 2023-03-06 PROCEDURE — G8399 PT W/DXA RESULTS DOCUMENT: HCPCS | Performed by: NURSE PRACTITIONER

## 2023-03-06 PROCEDURE — 99213 OFFICE O/P EST LOW 20 MIN: CPT | Performed by: NURSE PRACTITIONER

## 2023-03-06 PROCEDURE — 1090F PRES/ABSN URINE INCON ASSESS: CPT | Performed by: NURSE PRACTITIONER

## 2023-03-06 PROCEDURE — 1123F ACP DISCUSS/DSCN MKR DOCD: CPT | Performed by: NURSE PRACTITIONER

## 2023-03-06 PROCEDURE — 1036F TOBACCO NON-USER: CPT | Performed by: NURSE PRACTITIONER

## 2023-03-06 RX ORDER — BENZONATATE 200 MG/1
200 CAPSULE ORAL 3 TIMES DAILY PRN
Qty: 30 CAPSULE | Refills: 0 | Status: SHIPPED | OUTPATIENT
Start: 2023-03-06 | End: 2023-03-13

## 2023-03-06 RX ORDER — CEFDINIR 300 MG/1
300 CAPSULE ORAL 2 TIMES DAILY
Qty: 20 CAPSULE | Refills: 0 | Status: SHIPPED | OUTPATIENT
Start: 2023-03-06 | End: 2023-03-16

## 2023-03-06 ASSESSMENT — PATIENT HEALTH QUESTIONNAIRE - PHQ9
SUM OF ALL RESPONSES TO PHQ QUESTIONS 1-9: 0
2. FEELING DOWN, DEPRESSED OR HOPELESS: 0
SUM OF ALL RESPONSES TO PHQ QUESTIONS 1-9: 0
SUM OF ALL RESPONSES TO PHQ9 QUESTIONS 1 & 2: 0
1. LITTLE INTEREST OR PLEASURE IN DOING THINGS: 0

## 2023-03-06 NOTE — PROGRESS NOTES
SUBJECTIVE:    Patient ID: Ronnie Henson is a 70 y. o.female. Chief Complaint   Patient presents with    Congestion     X 2 days, drainage in her neck is causing her to cough. HPI:    Pt c/o congestion for 2 days. Other S&S frontal sinus pain/pressure several days, post nasal drainage and mild congested cough. Lying flat makes it worse. Hx seasonal env allergies and taking daily allegra, singulair and not helpful. No CP, SOB, wheezing, fevers. Covid test today. Patient's medications, allergies, past medical, surgical, social and family histories were reviewed and updated as appropriate in electronic medical record. No outpatient medications have been marked as taking for the 3/6/23 encounter (Office Visit) with MICKI Fry CNP. Review of Systems   Constitutional:  Positive for fatigue. HENT:  Positive for congestion, postnasal drip, rhinorrhea, sinus pressure and sinus pain. Respiratory:  Positive for cough. All other systems reviewed and are negative.     Past Medical History:   Diagnosis Date    Anxiety     Arthritis     Asthma     Diabetes mellitus ([address]     GERD (gastroesophageal reflux disease)     Hyperlipidemia     Hypertension     Hypothyroidism     Melanoma ([address] 2003    skin cancer removed from [facility]    Osteoporosis     Sleep apnea     Thyroid nodule      Past Surgical History:   Procedure Laterality Date    BREAST BIOPSY      COLON SURGERY      POLYPS REMOVED    COLONOSCOPY  02/24/2017    Dr Jeffers Bring Bilateral 07/2020    cataract    EYE SURGERY Right 01/05/2022    laser    POLYPECTOMY      SKIN CANCER EXCISION      RIGHT LEG     THYROIDECTOMY      WRIST SURGERY      LEFT     Family History   Problem Relation Age of Onset    Diabetes Mother     Heart Disease Mother         MI    Stroke Mother     Heart Disease Father         MI    Stroke Father     Diabetes Brother     Hypertension Brother     Diabetes Sister     Hypertension Sister     Breast Cancer Sister       Social History     Tobacco Use   Smoking Status Never   Smokeless Tobacco Never       OBJECTIVE:   Wt Readings from Last 3 Encounters:   12/06/22 158 lb (71.7 kg)   10/19/22 154 lb (69.9 kg)   10/17/22 157 lb (71.2 kg)     BP Readings from Last 3 Encounters:   12/06/22 136/89   10/19/22 (!) 160/86   08/25/22 (!) 182/76       Pulse 86   Temp 98.7 °F (37.1 °C)   SpO2 98%      Physical Exam  Vitals and nursing note reviewed. Constitutional:       Appearance: Normal appearance. HENT:      Head: Normocephalic. Right Ear: External ear normal.      Left Ear: External ear normal.      Nose: Congestion present. Comments: Frontal sinus ttp     Mouth/Throat:      Mouth: Mucous membranes are moist.      Pharynx: Oropharynx is clear. Posterior oropharyngeal erythema present. No oropharyngeal exudate. Eyes:      Conjunctiva/sclera: Conjunctivae normal.   Cardiovascular:      Rate and Rhythm: Normal rate and regular rhythm. Pulmonary:      Effort: Pulmonary effort is normal.      Breath sounds: Normal breath sounds. Musculoskeletal:      Cervical back: Normal range of motion. Neurological:      General: No focal deficit present. Mental Status: She is alert and oriented to person, place, and time.    Psychiatric:         Mood and Affect: Mood normal.         Behavior: Behavior normal.       Lab Results   Component Value Date/Time     11/14/2022 08:40 AM    K 4.5 11/14/2022 08:40 AM    K 3.7 08/25/2022 11:14 AM     11/14/2022 08:40 AM    CO2 27 11/14/2022 08:40 AM    GLUCOSE 118 11/14/2022 08:40 AM    BUN 18 11/14/2022 08:40 AM    CREATININE 1.0 11/14/2022 08:40 AM    CALCIUM 9.3 11/14/2022 08:40 AM    PROT 6.5 11/14/2022 08:40 AM    LABALBU 4.2 11/14/2022 08:40 AM    BILITOT 0.4 11/14/2022 08:40 AM    ALT 21 11/14/2022 08:40 AM    AST 18 11/14/2022 08:40 AM       Hemoglobin A1C (%)   Date Value   11/14/2022 6.8 (H)     Microalbumin, Random Urine (mg/dL)   Date Value 06/23/2022 6.20     LDL Calculated (mg/dL)   Date Value   11/14/2022 77         Lab Results   Component Value Date/Time    WBC 6.0 11/14/2022 08:40 AM    NEUTROABS 5.0 08/25/2022 11:14 AM    HGB 11.3 11/14/2022 08:40 AM    HCT 35.6 11/14/2022 08:40 AM    MCV 88.6 11/14/2022 08:40 AM     11/14/2022 08:40 AM       Lab Results   Component Value Date    TSH 5.20 (H) 11/14/2022         ASSESSMENT/PLAN:     1. Acute frontal sinusitis, recurrence not specified  New, acute. Take medications as prescribed. Continue home medications. Discussed symptom management. Return to clinic S&S worsen or no improvement noted. Patient verbalized understanding. 2. Allergic rhinitis, unspecified seasonality, unspecified trigger  Chronic. Flare up. Continue daily allergy meds. Treat underlying sinus infection as above. Pt agreeable. F/u prn.     3. Encounter for screening for COVID-19  Covid neg.  See above   - POCT COVID-19 Rapid, NAAT        Orders Placed This Encounter   Medications    cefdinir (OMNICEF) 300 MG capsule     Sig: Take 1 capsule by mouth 2 times daily for 10 days     Dispense:  20 capsule     Refill:  0    benzonatate (TESSALON) 200 MG capsule     Sig: Take 1 capsule by mouth 3 times daily as needed for Cough     Dispense:  30 capsule     Refill:  0

## 2023-03-06 NOTE — PROGRESS NOTES
Chief Complaint   Patient presents with    Congestion     X 2 days, drainage in her neck is causing her to cough.

## 2023-03-18 ASSESSMENT — ENCOUNTER SYMPTOMS
RHINORRHEA: 1
COUGH: 1
SINUS PAIN: 1
SINUS PRESSURE: 1

## 2023-03-30 ENCOUNTER — TELEPHONE (OUTPATIENT)
Dept: CARDIOLOGY | Facility: CLINIC | Age: 78
End: 2023-03-30
Payer: MEDICARE

## 2023-03-30 RX ORDER — ATENOLOL 50 MG/1
50 TABLET ORAL DAILY
Qty: 30 TABLET | Refills: 1 | Status: SHIPPED | OUTPATIENT
Start: 2023-03-30

## 2023-03-31 ENCOUNTER — HOSPITAL ENCOUNTER (OUTPATIENT)
Facility: HOSPITAL | Age: 78
Discharge: HOME | End: 2023-03-31
Payer: MEDICARE

## 2023-03-31 DIAGNOSIS — E78.00 PURE HYPERCHOLESTEROLEMIA: ICD-10-CM

## 2023-03-31 DIAGNOSIS — Z13.29 THYROID DISORDER SCREEN: ICD-10-CM

## 2023-03-31 DIAGNOSIS — E11.9 TYPE 2 DIABETES MELLITUS WITHOUT COMPLICATION, WITHOUT LONG-TERM CURRENT USE OF INSULIN (HCC): ICD-10-CM

## 2023-03-31 DIAGNOSIS — E55.9 VITAMIN D DEFICIENCY: ICD-10-CM

## 2023-03-31 LAB
25(OH)D3 SERPL-MCNC: 17.9 NG/ML (ref 32–100)
ALBUMIN SERPL-MCNC: 4.1 G/DL (ref 3.4–4.8)
ALBUMIN/GLOB SERPL: 1.7 {RATIO} (ref 0.8–2)
ALP SERPL-CCNC: 147 U/L (ref 25–100)
ALT SERPL-CCNC: 16 U/L (ref 4–36)
ANION GAP SERPL CALCULATED.3IONS-SCNC: 9 MMOL/L (ref 3–16)
AST SERPL-CCNC: 14 U/L (ref 8–33)
BILIRUB SERPL-MCNC: <0.2 MG/DL (ref 0.3–1.2)
BUN SERPL-MCNC: 17 MG/DL (ref 6–20)
CALCIUM SERPL-MCNC: 9.3 MG/DL (ref 8.5–10.5)
CHLORIDE SERPL-SCNC: 105 MMOL/L (ref 98–107)
CHOLEST SERPL-MCNC: 139 MG/DL (ref 0–200)
CO2 SERPL-SCNC: 25 MMOL/L (ref 20–30)
CREAT SERPL-MCNC: 0.9 MG/DL (ref 0.4–1.2)
ERYTHROCYTE [DISTWIDTH] IN BLOOD BY AUTOMATED COUNT: 13.3 % (ref 11–16)
FOLATE SERPL-MCNC: 14.21 NG/ML
GFR SERPLBLD CREATININE-BSD FMLA CKD-EPI: >60 ML/MIN/{1.73_M2}
GLOBULIN SER CALC-MCNC: 2.4 G/DL
GLUCOSE SERPL-MCNC: 112 MG/DL (ref 74–106)
HBA1C MFR BLD: 6.7 %
HCT VFR BLD AUTO: 34.9 % (ref 37–47)
HDLC SERPL-MCNC: 48 MG/DL (ref 40–60)
HGB BLD-MCNC: 11.1 G/DL (ref 11.5–16.5)
LDLC SERPL CALC-MCNC: 65 MG/DL
MCH RBC QN AUTO: 28.4 PG (ref 27–32)
MCHC RBC AUTO-ENTMCNC: 31.8 G/DL (ref 31–35)
MCV RBC AUTO: 89.3 FL (ref 80–100)
PLATELET # BLD AUTO: 248 K/UL (ref 150–400)
PMV BLD AUTO: 11 FL (ref 6–10)
POTASSIUM SERPL-SCNC: 5.5 MMOL/L (ref 3.4–5.1)
PROT SERPL-MCNC: 6.5 G/DL (ref 6.4–8.3)
RBC # BLD AUTO: 3.91 M/UL (ref 3.8–5.8)
SODIUM SERPL-SCNC: 139 MMOL/L (ref 136–145)
TRIGL SERPL-MCNC: 128 MG/DL (ref 0–249)
TSH SERPL DL<=0.005 MIU/L-ACNC: 4.66 UIU/ML (ref 0.27–4.2)
VIT B12 SERPL-MCNC: 1450 PG/ML (ref 211–911)
VLDLC SERPL CALC-MCNC: 26 MG/DL
WBC # BLD AUTO: 6.1 K/UL (ref 4–11)

## 2023-03-31 PROCEDURE — 82746 ASSAY OF FOLIC ACID SERUM: CPT

## 2023-03-31 PROCEDURE — 85027 COMPLETE CBC AUTOMATED: CPT

## 2023-03-31 PROCEDURE — 83036 HEMOGLOBIN GLYCOSYLATED A1C: CPT

## 2023-03-31 PROCEDURE — 82607 VITAMIN B-12: CPT

## 2023-03-31 PROCEDURE — 80053 COMPREHEN METABOLIC PANEL: CPT

## 2023-03-31 PROCEDURE — 82306 VITAMIN D 25 HYDROXY: CPT

## 2023-03-31 PROCEDURE — 84443 ASSAY THYROID STIM HORMONE: CPT

## 2023-03-31 PROCEDURE — 80061 LIPID PANEL: CPT

## 2023-04-04 PROBLEM — I20.9 ANGINA PECTORIS, UNSPECIFIED (HCC): Status: ACTIVE | Noted: 2023-04-04

## 2023-04-04 PROBLEM — I25.119 ATHEROSCLEROTIC HEART DISEASE OF NATIVE CORONARY ARTERY WITH UNSPECIFIED ANGINA PECTORIS (HCC): Status: ACTIVE | Noted: 2023-04-04

## 2023-04-17 RX ORDER — SACUBITRIL AND VALSARTAN 97; 103 MG/1; MG/1
1 TABLET, FILM COATED ORAL 2 TIMES DAILY
Qty: 180 TABLET | Refills: 0 | Status: SHIPPED | OUTPATIENT
Start: 2023-04-17

## 2023-04-20 ENCOUNTER — TELEPHONE (OUTPATIENT)
Dept: PRIMARY CARE CLINIC | Age: 78
End: 2023-04-20

## 2023-04-20 NOTE — TELEPHONE ENCOUNTER
Pt called stating that her K+ was elevated recently and asking if Silvia Herrera was going to treat. Per BK pt is to have repeated labs to confirm.  Pt v/u and agreeable to stopping by in the morning

## 2023-04-21 ENCOUNTER — HOSPITAL ENCOUNTER (OUTPATIENT)
Facility: HOSPITAL | Age: 78
Discharge: HOME | End: 2023-04-21
Payer: MEDICARE

## 2023-04-21 DIAGNOSIS — E11.9 TYPE 2 DIABETES MELLITUS WITHOUT COMPLICATION, WITHOUT LONG-TERM CURRENT USE OF INSULIN (HCC): ICD-10-CM

## 2023-04-21 LAB
ALBUMIN SERPL-MCNC: 3.9 G/DL (ref 3.4–4.8)
ALBUMIN/GLOB SERPL: 1.8 {RATIO} (ref 0.8–2)
ALP SERPL-CCNC: 131 U/L (ref 25–100)
ALT SERPL-CCNC: 13 U/L (ref 4–36)
ANION GAP SERPL CALCULATED.3IONS-SCNC: 9 MMOL/L (ref 3–16)
AST SERPL-CCNC: 13 U/L (ref 8–33)
BILIRUB SERPL-MCNC: 0.4 MG/DL (ref 0.3–1.2)
BUN SERPL-MCNC: 18 MG/DL (ref 6–20)
CALCIUM SERPL-MCNC: 8.9 MG/DL (ref 8.5–10.5)
CHLORIDE SERPL-SCNC: 104 MMOL/L (ref 98–107)
CO2 SERPL-SCNC: 25 MMOL/L (ref 20–30)
CREAT SERPL-MCNC: 0.9 MG/DL (ref 0.4–1.2)
ERYTHROCYTE [DISTWIDTH] IN BLOOD BY AUTOMATED COUNT: 13.9 % (ref 11–16)
GFR SERPLBLD CREATININE-BSD FMLA CKD-EPI: >60 ML/MIN/{1.73_M2}
GLOBULIN SER CALC-MCNC: 2.2 G/DL
GLUCOSE SERPL-MCNC: 125 MG/DL (ref 74–106)
HCT VFR BLD AUTO: 32 % (ref 37–47)
HGB BLD-MCNC: 10.1 G/DL (ref 11.5–16.5)
MCH RBC QN AUTO: 28.1 PG (ref 27–32)
MCHC RBC AUTO-ENTMCNC: 31.6 G/DL (ref 31–35)
MCV RBC AUTO: 89.1 FL (ref 80–100)
PLATELET # BLD AUTO: 222 K/UL (ref 150–400)
PMV BLD AUTO: 11 FL (ref 6–10)
POTASSIUM SERPL-SCNC: 4.2 MMOL/L (ref 3.4–5.1)
PROT SERPL-MCNC: 6.1 G/DL (ref 6.4–8.3)
RBC # BLD AUTO: 3.59 M/UL (ref 3.8–5.8)
SODIUM SERPL-SCNC: 138 MMOL/L (ref 136–145)
WBC # BLD AUTO: 4.8 K/UL (ref 4–11)

## 2023-04-21 PROCEDURE — 80053 COMPREHEN METABOLIC PANEL: CPT

## 2023-04-21 PROCEDURE — 85027 COMPLETE CBC AUTOMATED: CPT

## 2023-05-05 ENCOUNTER — TELEPHONE (OUTPATIENT)
Dept: CARDIOLOGY | Facility: CLINIC | Age: 78
End: 2023-05-05
Payer: MEDICARE

## 2023-07-10 ENCOUNTER — OFFICE VISIT (OUTPATIENT)
Dept: PRIMARY CARE CLINIC | Age: 78
End: 2023-07-10
Payer: MEDICARE

## 2023-07-10 ENCOUNTER — HOSPITAL ENCOUNTER (OUTPATIENT)
Facility: HOSPITAL | Age: 78
Discharge: HOME | End: 2023-07-10
Payer: MEDICARE

## 2023-07-10 VITALS
DIASTOLIC BLOOD PRESSURE: 73 MMHG | RESPIRATION RATE: 14 BRPM | HEART RATE: 65 BPM | SYSTOLIC BLOOD PRESSURE: 132 MMHG | BODY MASS INDEX: 29 KG/M2 | WEIGHT: 157.6 LBS | OXYGEN SATURATION: 98 % | TEMPERATURE: 97.8 F | HEIGHT: 62 IN

## 2023-07-10 DIAGNOSIS — E11.9 TYPE 2 DIABETES MELLITUS WITHOUT COMPLICATION, WITHOUT LONG-TERM CURRENT USE OF INSULIN (HCC): ICD-10-CM

## 2023-07-10 DIAGNOSIS — R07.89 ATYPICAL CHEST PAIN: ICD-10-CM

## 2023-07-10 DIAGNOSIS — I50.9 CHRONIC CONGESTIVE HEART FAILURE, UNSPECIFIED HEART FAILURE TYPE (HCC): ICD-10-CM

## 2023-07-10 DIAGNOSIS — I10 PRIMARY HYPERTENSION: Primary | ICD-10-CM

## 2023-07-10 DIAGNOSIS — E03.9 ACQUIRED HYPOTHYROIDISM: ICD-10-CM

## 2023-07-10 DIAGNOSIS — D64.9 ANEMIA, UNSPECIFIED TYPE: ICD-10-CM

## 2023-07-10 LAB
ERYTHROCYTE [DISTWIDTH] IN BLOOD BY AUTOMATED COUNT: 13.7 % (ref 11–16)
FERRITIN SERPL IA-MCNC: 19.7 NG/ML (ref 22–322)
HBA1C MFR BLD: 6.8 %
HCT VFR BLD AUTO: 34.3 % (ref 37–47)
HGB BLD-MCNC: 10.7 G/DL (ref 11.5–16.5)
IRON SATN MFR SERPL: 11 % (ref 15–50)
IRON SERPL-MCNC: 36 UG/DL (ref 37–145)
MCH RBC QN AUTO: 27.6 PG (ref 27–32)
MCHC RBC AUTO-ENTMCNC: 31.2 G/DL (ref 31–35)
MCV RBC AUTO: 88.6 FL (ref 80–100)
PLATELET # BLD AUTO: 232 K/UL (ref 150–400)
PMV BLD AUTO: 11.1 FL (ref 6–10)
RBC # BLD AUTO: 3.87 M/UL (ref 3.8–5.8)
TIBC SERPL-MCNC: 329 UG/DL (ref 250–450)
TSH SERPL DL<=0.005 MIU/L-ACNC: 1.22 UIU/ML (ref 0.27–4.2)
WBC # BLD AUTO: 5.8 K/UL (ref 4–11)

## 2023-07-10 PROCEDURE — G8427 DOCREV CUR MEDS BY ELIG CLIN: HCPCS | Performed by: NURSE PRACTITIONER

## 2023-07-10 PROCEDURE — 85027 COMPLETE CBC AUTOMATED: CPT

## 2023-07-10 PROCEDURE — 84443 ASSAY THYROID STIM HORMONE: CPT

## 2023-07-10 PROCEDURE — 99214 OFFICE O/P EST MOD 30 MIN: CPT | Performed by: NURSE PRACTITIONER

## 2023-07-10 PROCEDURE — 3044F HG A1C LEVEL LT 7.0%: CPT | Performed by: NURSE PRACTITIONER

## 2023-07-10 PROCEDURE — 1036F TOBACCO NON-USER: CPT | Performed by: NURSE PRACTITIONER

## 2023-07-10 PROCEDURE — G8399 PT W/DXA RESULTS DOCUMENT: HCPCS | Performed by: NURSE PRACTITIONER

## 2023-07-10 PROCEDURE — 1090F PRES/ABSN URINE INCON ASSESS: CPT | Performed by: NURSE PRACTITIONER

## 2023-07-10 PROCEDURE — 83550 IRON BINDING TEST: CPT

## 2023-07-10 PROCEDURE — 83036 HEMOGLOBIN GLYCOSYLATED A1C: CPT

## 2023-07-10 PROCEDURE — 82728 ASSAY OF FERRITIN: CPT

## 2023-07-10 PROCEDURE — 3078F DIAST BP <80 MM HG: CPT | Performed by: NURSE PRACTITIONER

## 2023-07-10 PROCEDURE — 3075F SYST BP GE 130 - 139MM HG: CPT | Performed by: NURSE PRACTITIONER

## 2023-07-10 PROCEDURE — G8417 CALC BMI ABV UP PARAM F/U: HCPCS | Performed by: NURSE PRACTITIONER

## 2023-07-10 PROCEDURE — 1123F ACP DISCUSS/DSCN MKR DOCD: CPT | Performed by: NURSE PRACTITIONER

## 2023-07-10 PROCEDURE — 83540 ASSAY OF IRON: CPT

## 2023-07-10 RX ORDER — CARVEDILOL 25 MG/1
25 TABLET ORAL 2 TIMES DAILY
Qty: 180 TABLET | Refills: 1 | Status: SHIPPED | OUTPATIENT
Start: 2023-07-10

## 2023-07-10 RX ORDER — SIMETHICONE 80 MG
80 TABLET,CHEWABLE ORAL EVERY 6 HOURS PRN
COMMUNITY

## 2023-07-10 RX ORDER — NITROGLYCERIN 0.4 MG/1
0.4 TABLET SUBLINGUAL PRN
Qty: 25 TABLET | Refills: 3 | Status: SHIPPED | OUTPATIENT
Start: 2023-07-10

## 2023-07-10 RX ORDER — ZINC GLUCONATE 50 MG
50 TABLET ORAL DAILY
COMMUNITY

## 2023-07-10 RX ORDER — CARVEDILOL 25 MG/1
25 TABLET ORAL 2 TIMES DAILY
COMMUNITY
Start: 2023-04-17 | End: 2023-07-10 | Stop reason: SDUPTHER

## 2023-07-10 RX ORDER — HYDROXYZINE PAMOATE 25 MG/1
25 CAPSULE ORAL 2 TIMES DAILY PRN
Qty: 60 CAPSULE | Refills: 2 | Status: SHIPPED | OUTPATIENT
Start: 2023-07-10 | End: 2023-08-09

## 2023-07-10 ASSESSMENT — ENCOUNTER SYMPTOMS
RESPIRATORY NEGATIVE: 1
ALLERGIC/IMMUNOLOGIC NEGATIVE: 1
EYES NEGATIVE: 1
GASTROINTESTINAL NEGATIVE: 1

## 2023-07-10 NOTE — PROGRESS NOTES
Chief Complaint   Patient presents with    Diabetes    Hypertension       Have you seen any other physician or provider since your last visit yes - Dr Nahomy Oswald    Have you had any other diagnostic tests since your last visit? yes - labs in April     Have you changed or stopped any medications since your last visit? yes - stopped atenolol and Lisinopril and started Coreg per Dr Mariia Caponet:    Patient Claudene Collum is a 70 y.o. female. Chief Complaint   Patient presents with    Diabetes    Hypertension     HPI:  Patient has had diabetes for the past several years. She has been compliant with the medications and denies any side effects from it. She has been monitoring fingersticks on a daily basis. Her fingerstick range is between 118. She denies any hypoglycemic symptoms. She has been following a diabetic diet and has been active. Her last eye exam was less than a year ago. She is using oral meds only. She says since being on Entresto she is doing better with activity nd shortness of breath is better. She goes to Dr. Nahomy Oswald. Patient has had hypertension for several years. She has been compliant with taking medications, without side effects from it. She has been following a low-sodium, is active and rarely exercises. Weight is fluctuating a bit, compared to last visit. Her blood pressure is stable 132/73 at this time. Patient's medications, allergies, past medical, surgical, social and family histories were reviewed and updated as appropriate. Review of Systems   Constitutional: Negative. HENT: Negative. Eyes: Negative. Respiratory: Negative. Cardiovascular: Negative. Gastrointestinal: Negative. Endocrine: Negative. Genitourinary: Negative. Musculoskeletal: Negative. Skin: Negative. Allergic/Immunologic: Negative. Neurological: Negative. Hematological: Negative. Psychiatric/Behavioral: Negative.        OBJECTIVE:  /73 (Site:

## 2023-07-17 ENCOUNTER — HOSPITAL ENCOUNTER (EMERGENCY)
Facility: HOSPITAL | Age: 78
Discharge: HOME | End: 2023-07-17
Attending: EMERGENCY MEDICINE
Payer: MEDICARE

## 2023-07-17 ENCOUNTER — APPOINTMENT (OUTPATIENT)
Dept: GENERAL RADIOLOGY | Facility: HOSPITAL | Age: 78
End: 2023-07-17
Attending: EMERGENCY MEDICINE
Payer: MEDICARE

## 2023-07-17 VITALS
SYSTOLIC BLOOD PRESSURE: 150 MMHG | TEMPERATURE: 98.1 F | HEART RATE: 69 BPM | DIASTOLIC BLOOD PRESSURE: 72 MMHG | HEIGHT: 62 IN | OXYGEN SATURATION: 95 % | BODY MASS INDEX: 28.89 KG/M2 | WEIGHT: 157 LBS | RESPIRATION RATE: 16 BRPM

## 2023-07-17 DIAGNOSIS — R07.89 CHEST WALL PAIN: Primary | ICD-10-CM

## 2023-07-17 PROCEDURE — 96372 THER/PROPH/DIAG INJ SC/IM: CPT

## 2023-07-17 PROCEDURE — 6360000002 HC RX W HCPCS

## 2023-07-17 PROCEDURE — 99284 EMERGENCY DEPT VISIT MOD MDM: CPT

## 2023-07-17 PROCEDURE — 71101 X-RAY EXAM UNILAT RIBS/CHEST: CPT

## 2023-07-17 RX ORDER — KETOROLAC TROMETHAMINE 30 MG/ML
15 INJECTION, SOLUTION INTRAMUSCULAR; INTRAVENOUS ONCE
Status: COMPLETED | OUTPATIENT
Start: 2023-07-17 | End: 2023-07-17

## 2023-07-17 RX ORDER — KETOROLAC TROMETHAMINE 30 MG/ML
INJECTION, SOLUTION INTRAMUSCULAR; INTRAVENOUS
Status: COMPLETED
Start: 2023-07-17 | End: 2023-07-17

## 2023-07-17 RX ORDER — KETOROLAC TROMETHAMINE 10 MG/1
10 TABLET, FILM COATED ORAL EVERY 6 HOURS PRN
Qty: 20 TABLET | Refills: 0 | Status: SHIPPED | OUTPATIENT
Start: 2023-07-17 | End: 2024-07-16

## 2023-07-17 RX ADMIN — KETOROLAC TROMETHAMINE 15 MG: 30 INJECTION, SOLUTION INTRAMUSCULAR; INTRAVENOUS at 14:03

## 2023-07-17 RX ADMIN — KETOROLAC TROMETHAMINE 15 MG: 60 INJECTION, SOLUTION INTRAMUSCULAR at 14:03

## 2023-07-17 ASSESSMENT — PAIN DESCRIPTION - ORIENTATION
ORIENTATION: LEFT
ORIENTATION: LEFT

## 2023-07-17 ASSESSMENT — PAIN DESCRIPTION - LOCATION
LOCATION: RIB CAGE
LOCATION: RIB CAGE

## 2023-07-17 ASSESSMENT — PAIN DESCRIPTION - PAIN TYPE: TYPE: ACUTE PAIN

## 2023-07-17 ASSESSMENT — LIFESTYLE VARIABLES
HOW OFTEN DO YOU HAVE A DRINK CONTAINING ALCOHOL: NEVER
HOW MANY STANDARD DRINKS CONTAINING ALCOHOL DO YOU HAVE ON A TYPICAL DAY: PATIENT DOES NOT DRINK

## 2023-07-17 ASSESSMENT — PAIN SCALES - GENERAL
PAINLEVEL_OUTOF10: 5
PAINLEVEL_OUTOF10: 0
PAINLEVEL_OUTOF10: 4

## 2023-07-17 ASSESSMENT — PAIN DESCRIPTION - FREQUENCY: FREQUENCY: CONTINUOUS

## 2023-07-17 ASSESSMENT — PAIN - FUNCTIONAL ASSESSMENT: PAIN_FUNCTIONAL_ASSESSMENT: 0-10

## 2023-07-17 ASSESSMENT — PAIN DESCRIPTION - DESCRIPTORS: DESCRIPTORS: DULL

## 2023-07-17 NOTE — ED NOTES
Patient states that she went to roll over in the bed about 4 days about and fell out of the bed. Patient states that she has left rib pain.      Simon Allen RN  07/17/23 0622

## 2023-07-17 NOTE — ED PROVIDER NOTES
MEDICATIONS ADMINISTERED IN ED:  Medications   ketorolac (TORADOL) injection 15 mg (15 mg IntraMUSCular Given 7/17/23 1403)       Patient has been stable states the Toradol is helping chest x-ray rib x-ray are negative for any rib fracture or other traumatic injury. We will discharge patient to home she was instructed to go ahead and use heat to the area which should help with her pain and I will also give her prescription for Toradol for the next few days she will follow-up with her family doctor in 3 to 4 days if she is still having trouble. The patient will follow-up with their PCP in 1-2 days for reevaluation. If the patient or family members have anyfurther concerns or any worsening symptoms they will return to the ED for reevaluation. Is this patient to be included in the SEP-1 Core Measure due to severe sepsis or septic shock? No   Exclusion criteria - the patient is NOT to be included for SEP-1 Core Measure due to: Infection is not suspected          CONSULTS:  None    PROCEDURES:  Procedures    CRITICAL CARE TIME    Total Critical Care time was 0 minutes, excluding separately reportable procedures. There was a high probability of clinically significant/life threatening deterioration in the patient's condition which required my urgent intervention. FINAL IMPRESSION      1.  Chest wall pain          DISPOSITION/PLAN   DISPOSITION Decision To Discharge 07/17/2023 02:22:21 PM  Improved discharge to home    PATIENT REFERRED TO:  Kailee Albright, MICKI  [address] [facility]  [phone]    Schedule an appointment as soon as possible for a visit in 3 days        DISCHARGE MEDICATIONS:  New Prescriptions    KETOROLAC (TORADOL) 10 MG TABLET    Take 1 tablet by mouth every 6 hours as needed for Pain       Comment: Please note this report has been produced using speech recognition software and may contain errorsrelated to that system including errors in grammar, punctuation, and

## 2023-07-17 NOTE — ED NOTES
Reviewed discharge plan with Nelda Bobo. Encouraged her to f/u with MICKI Ridley and she understood. NAD noted on discharge, gait steady. Reviewed discharge prescription for:     Current Discharge Medication List        START taking these medications    Details   ketorolac (TORADOL) 10 MG tablet Take 1 tablet by mouth every 6 hours as needed for Pain  Qty: 20 tablet, Refills: 0             Veryl  states understanding of how and when to take medications.       Electronically signed by Jes López RN on 7/17/2023 at 2:43 PM       Jes López RN  07/17/23 1673

## 2023-07-21 ENCOUNTER — OFFICE VISIT (OUTPATIENT)
Dept: PRIMARY CARE CLINIC | Age: 78
End: 2023-07-21
Payer: MEDICARE

## 2023-07-21 ENCOUNTER — TELEPHONE (OUTPATIENT)
Dept: PRIMARY CARE CLINIC | Age: 78
End: 2023-07-21

## 2023-07-21 VITALS
DIASTOLIC BLOOD PRESSURE: 70 MMHG | SYSTOLIC BLOOD PRESSURE: 138 MMHG | TEMPERATURE: 97.5 F | OXYGEN SATURATION: 97 % | HEART RATE: 66 BPM

## 2023-07-21 DIAGNOSIS — R05.9 COUGH, UNSPECIFIED TYPE: ICD-10-CM

## 2023-07-21 DIAGNOSIS — J01.00 ACUTE NON-RECURRENT MAXILLARY SINUSITIS: ICD-10-CM

## 2023-07-21 DIAGNOSIS — J01.00 ACUTE NON-RECURRENT MAXILLARY SINUSITIS: Primary | ICD-10-CM

## 2023-07-21 LAB
Lab: NORMAL
QC PASS/FAIL: NORMAL
SARS-COV-2 RDRP RESP QL NAA+PROBE: NEGATIVE

## 2023-07-21 PROCEDURE — 1090F PRES/ABSN URINE INCON ASSESS: CPT | Performed by: NURSE PRACTITIONER

## 2023-07-21 PROCEDURE — 99213 OFFICE O/P EST LOW 20 MIN: CPT | Performed by: NURSE PRACTITIONER

## 2023-07-21 PROCEDURE — 3078F DIAST BP <80 MM HG: CPT | Performed by: NURSE PRACTITIONER

## 2023-07-21 PROCEDURE — G8399 PT W/DXA RESULTS DOCUMENT: HCPCS | Performed by: NURSE PRACTITIONER

## 2023-07-21 PROCEDURE — 1123F ACP DISCUSS/DSCN MKR DOCD: CPT | Performed by: NURSE PRACTITIONER

## 2023-07-21 PROCEDURE — G8417 CALC BMI ABV UP PARAM F/U: HCPCS | Performed by: NURSE PRACTITIONER

## 2023-07-21 PROCEDURE — 1036F TOBACCO NON-USER: CPT | Performed by: NURSE PRACTITIONER

## 2023-07-21 PROCEDURE — G8427 DOCREV CUR MEDS BY ELIG CLIN: HCPCS | Performed by: NURSE PRACTITIONER

## 2023-07-21 PROCEDURE — 3074F SYST BP LT 130 MM HG: CPT | Performed by: NURSE PRACTITIONER

## 2023-07-21 RX ORDER — CEFDINIR 300 MG/1
300 CAPSULE ORAL 2 TIMES DAILY
Qty: 14 CAPSULE | Refills: 0 | Status: SHIPPED | OUTPATIENT
Start: 2023-07-21 | End: 2023-07-28

## 2023-07-21 RX ORDER — BENZONATATE 100 MG/1
100 CAPSULE ORAL 3 TIMES DAILY PRN
Qty: 30 CAPSULE | Refills: 0 | Status: SHIPPED | OUTPATIENT
Start: 2023-07-21 | End: 2023-07-21 | Stop reason: SDUPTHER

## 2023-07-21 RX ORDER — BENZONATATE 100 MG/1
100 CAPSULE ORAL 3 TIMES DAILY PRN
Qty: 30 CAPSULE | Refills: 0 | Status: SHIPPED | OUTPATIENT
Start: 2023-07-21 | End: 2023-07-31

## 2023-07-21 NOTE — PROGRESS NOTES
SUBJECTIVE:    Patient ID: Nila Singletary is a 70 y.o. female. Chief Complaint   Patient presents with    Sinus Problem     X 3 days, neg for fever, some body aches. Patient has a cough that is worse at night. Congestion    Pharyngitis         HPI:  She comes in with a 3 day history of cough congestion coughing up yellow phelgm. Patient's medications,allergies, past medical, surgical, social and family histories were reviewed and updated as appropriate. .  Current Outpatient Medications on File Prior to Visit   Medication Sig Dispense Refill    ketorolac (TORADOL) 10 MG tablet Take 1 tablet by mouth every 6 hours as needed for Pain 20 tablet 0    zinc 50 MG TABS tablet Take 1 tablet by mouth daily      simethicone (MYLICON) 80 MG chewable tablet Take 1 tablet by mouth every 6 hours as needed for Flatulence      carvedilol (COREG) 25 MG tablet Take 1 tablet by mouth 2 times daily 180 tablet 1    nitroGLYCERIN (NITROSTAT) 0.4 MG SL tablet Place 1 tablet under the tongue as needed for Chest pain 25 tablet 3    hydrOXYzine pamoate (VISTARIL) 25 MG capsule Take 1 capsule by mouth 2 times daily as needed for Itching or Anxiety 60 capsule 2    sacubitril-valsartan (ENTRESTO)  MG per tablet Take 1 tablet by mouth 2 times daily      levothyroxine (SYNTHROID) 50 MCG tablet Take 1 tablet by mouth daily 90 tablet 2    ergocalciferol (DRISDOL) 1.25 MG (68824 UT) capsule Take 1 capsule by mouth once a week 90 day supply 12 capsule 3    escitalopram (LEXAPRO) 10 MG tablet take 1 tablet by mouth once daily 90 tablet 3    esomeprazole (NEXIUM) 40 MG delayed release capsule 1 po daily in the am 30 minutes before breakfast 90 capsule 3    fexofenadine (ALLEGRA) 180 MG tablet Take 1 tablet by mouth daily 90 tablet 3    fluticasone (FLONASE) 50 MCG/ACT nasal spray instill 2 sprays into each nostril once daily.  90 day supply 16 g 3    atorvastatin (LIPITOR) 20 MG tablet Take 1 tablet by mouth daily 90 tablet 3

## 2023-07-21 NOTE — PROGRESS NOTES
Chief Complaint   Patient presents with    Sinus Problem     X 3 days, neg for fever, some body aches. Patient has a cough that is worse at night.      Congestion    Pharyngitis

## 2023-07-24 RX ORDER — CEFDINIR 300 MG/1
CAPSULE ORAL
Qty: 14 CAPSULE | Refills: 0 | OUTPATIENT
Start: 2023-07-24

## 2023-07-27 DIAGNOSIS — E11.9 TYPE 2 DIABETES MELLITUS WITHOUT COMPLICATION, WITHOUT LONG-TERM CURRENT USE OF INSULIN (HCC): ICD-10-CM

## 2023-07-27 DIAGNOSIS — R41.3 MEMORY LOSS: ICD-10-CM

## 2023-07-27 RX ORDER — SACUBITRIL AND VALSARTAN 97; 103 MG/1; MG/1
TABLET, FILM COATED ORAL
Qty: 180 TABLET | Refills: 1 | Status: SHIPPED | OUTPATIENT
Start: 2023-07-27

## 2023-07-28 RX ORDER — DONEPEZIL HYDROCHLORIDE 10 MG/1
TABLET, FILM COATED ORAL
Qty: 90 TABLET | Refills: 1 | Status: SHIPPED | OUTPATIENT
Start: 2023-07-28

## 2023-07-30 ASSESSMENT — ENCOUNTER SYMPTOMS
GASTROINTESTINAL NEGATIVE: 1
RESPIRATORY NEGATIVE: 1

## 2023-09-22 ENCOUNTER — OFFICE VISIT (OUTPATIENT)
Dept: PRIMARY CARE CLINIC | Age: 78
End: 2023-09-22
Payer: MEDICARE

## 2023-09-22 VITALS
BODY MASS INDEX: 29.66 KG/M2 | DIASTOLIC BLOOD PRESSURE: 72 MMHG | HEIGHT: 62 IN | TEMPERATURE: 98.9 F | RESPIRATION RATE: 16 BRPM | HEART RATE: 64 BPM | WEIGHT: 161.2 LBS | OXYGEN SATURATION: 98 % | SYSTOLIC BLOOD PRESSURE: 158 MMHG

## 2023-09-22 DIAGNOSIS — E55.9 VITAMIN D DEFICIENCY: ICD-10-CM

## 2023-09-22 DIAGNOSIS — J30.9 ALLERGIC RHINITIS, UNSPECIFIED SEASONALITY, UNSPECIFIED TRIGGER: ICD-10-CM

## 2023-09-22 DIAGNOSIS — Z23 NEED FOR INFLUENZA VACCINATION: ICD-10-CM

## 2023-09-22 DIAGNOSIS — Z00.00 MEDICARE ANNUAL WELLNESS VISIT, SUBSEQUENT: Primary | ICD-10-CM

## 2023-09-22 DIAGNOSIS — K21.9 GASTROESOPHAGEAL REFLUX DISEASE, UNSPECIFIED WHETHER ESOPHAGITIS PRESENT: ICD-10-CM

## 2023-09-22 DIAGNOSIS — M81.0 OSTEOPOROSIS, UNSPECIFIED OSTEOPOROSIS TYPE, UNSPECIFIED PATHOLOGICAL FRACTURE PRESENCE: ICD-10-CM

## 2023-09-22 DIAGNOSIS — F32.A DEPRESSION, UNSPECIFIED DEPRESSION TYPE: ICD-10-CM

## 2023-09-22 DIAGNOSIS — I10 ESSENTIAL HYPERTENSION: ICD-10-CM

## 2023-09-22 DIAGNOSIS — E78.00 PURE HYPERCHOLESTEROLEMIA: ICD-10-CM

## 2023-09-22 PROCEDURE — 1123F ACP DISCUSS/DSCN MKR DOCD: CPT | Performed by: NURSE PRACTITIONER

## 2023-09-22 PROCEDURE — 3077F SYST BP >= 140 MM HG: CPT | Performed by: NURSE PRACTITIONER

## 2023-09-22 PROCEDURE — 3078F DIAST BP <80 MM HG: CPT | Performed by: NURSE PRACTITIONER

## 2023-09-22 RX ORDER — ESCITALOPRAM OXALATE 10 MG/1
TABLET ORAL
Qty: 90 TABLET | Refills: 3 | Status: SHIPPED | OUTPATIENT
Start: 2023-09-22

## 2023-09-22 RX ORDER — ESOMEPRAZOLE MAGNESIUM 40 MG/1
CAPSULE, DELAYED RELEASE ORAL
Qty: 90 CAPSULE | Refills: 3 | Status: SHIPPED | OUTPATIENT
Start: 2023-09-22

## 2023-09-22 RX ORDER — FERROUS SULFATE 325(65) MG
325 TABLET ORAL
COMMUNITY

## 2023-09-22 RX ORDER — HYDROXYZINE PAMOATE 25 MG/1
CAPSULE ORAL
COMMUNITY
Start: 2023-09-11 | End: 2023-09-22 | Stop reason: SDUPTHER

## 2023-09-22 RX ORDER — HYDROXYZINE PAMOATE 25 MG/1
25 CAPSULE ORAL 3 TIMES DAILY PRN
Qty: 180 CAPSULE | Refills: 1 | Status: SHIPPED | OUTPATIENT
Start: 2023-09-22

## 2023-09-22 RX ORDER — ALENDRONATE SODIUM 70 MG/1
TABLET ORAL
Qty: 12 TABLET | Refills: 3 | Status: SHIPPED | OUTPATIENT
Start: 2023-09-22

## 2023-09-22 RX ORDER — ERGOCALCIFEROL 1.25 MG/1
50000 CAPSULE ORAL WEEKLY
Qty: 12 CAPSULE | Refills: 3 | Status: SHIPPED | OUTPATIENT
Start: 2023-09-22

## 2023-09-22 RX ORDER — LEVOTHYROXINE SODIUM 0.05 MG/1
50 TABLET ORAL DAILY
Qty: 90 TABLET | Refills: 2 | Status: SHIPPED | OUTPATIENT
Start: 2023-09-22

## 2023-09-22 RX ORDER — ATORVASTATIN CALCIUM 20 MG/1
20 TABLET, FILM COATED ORAL DAILY
Qty: 90 TABLET | Refills: 3 | Status: SHIPPED | OUTPATIENT
Start: 2023-09-22

## 2023-09-22 RX ORDER — MONTELUKAST SODIUM 10 MG/1
TABLET ORAL
Qty: 90 TABLET | Refills: 3 | Status: SHIPPED | OUTPATIENT
Start: 2023-09-22

## 2023-09-22 RX ORDER — FEXOFENADINE HCL 180 MG/1
180 TABLET ORAL DAILY
Qty: 90 TABLET | Refills: 3 | Status: SHIPPED | OUTPATIENT
Start: 2023-09-22

## 2023-09-22 RX ORDER — FLUTICASONE PROPIONATE 50 MCG
SPRAY, SUSPENSION (ML) NASAL
Qty: 16 G | Refills: 3 | Status: SHIPPED | OUTPATIENT
Start: 2023-09-22

## 2023-09-22 RX ORDER — ASPIRIN 81 MG/1
81 TABLET ORAL DAILY
Qty: 90 TABLET | Refills: 3 | Status: SHIPPED | OUTPATIENT
Start: 2023-09-22

## 2023-09-22 ASSESSMENT — PATIENT HEALTH QUESTIONNAIRE - PHQ9
2. FEELING DOWN, DEPRESSED OR HOPELESS: 0
SUM OF ALL RESPONSES TO PHQ9 QUESTIONS 1 & 2: 0
7. TROUBLE CONCENTRATING ON THINGS, SUCH AS READING THE NEWSPAPER OR WATCHING TELEVISION: 0
6. FEELING BAD ABOUT YOURSELF - OR THAT YOU ARE A FAILURE OR HAVE LET YOURSELF OR YOUR FAMILY DOWN: 0
4. FEELING TIRED OR HAVING LITTLE ENERGY: 1
10. IF YOU CHECKED OFF ANY PROBLEMS, HOW DIFFICULT HAVE THESE PROBLEMS MADE IT FOR YOU TO DO YOUR WORK, TAKE CARE OF THINGS AT HOME, OR GET ALONG WITH OTHER PEOPLE: 0
SUM OF ALL RESPONSES TO PHQ QUESTIONS 1-9: 2
SUM OF ALL RESPONSES TO PHQ QUESTIONS 1-9: 2
3. TROUBLE FALLING OR STAYING ASLEEP: 0
9. THOUGHTS THAT YOU WOULD BE BETTER OFF DEAD, OR OF HURTING YOURSELF: 0
1. LITTLE INTEREST OR PLEASURE IN DOING THINGS: 0
SUM OF ALL RESPONSES TO PHQ QUESTIONS 1-9: 2
SUM OF ALL RESPONSES TO PHQ QUESTIONS 1-9: 2
5. POOR APPETITE OR OVEREATING: 1
8. MOVING OR SPEAKING SO SLOWLY THAT OTHER PEOPLE COULD HAVE NOTICED. OR THE OPPOSITE, BEING SO FIGETY OR RESTLESS THAT YOU HAVE BEEN MOVING AROUND A LOT MORE THAN USUAL: 0

## 2023-09-22 ASSESSMENT — LIFESTYLE VARIABLES: HOW OFTEN DO YOU HAVE A DRINK CONTAINING ALCOHOL: NEVER

## 2023-10-26 ENCOUNTER — HOSPITAL ENCOUNTER (OUTPATIENT)
Dept: MAMMOGRAPHY | Facility: HOSPITAL | Age: 78
Discharge: HOME | End: 2023-10-26
Payer: MEDICARE

## 2023-10-26 VITALS — WEIGHT: 161 LBS | BODY MASS INDEX: 29.45 KG/M2

## 2023-10-26 DIAGNOSIS — Z12.31 BREAST CANCER SCREENING BY MAMMOGRAM: ICD-10-CM

## 2023-10-26 PROCEDURE — 77063 BREAST TOMOSYNTHESIS BI: CPT

## 2023-11-08 DIAGNOSIS — I10 PRIMARY HYPERTENSION: ICD-10-CM

## 2023-11-08 DIAGNOSIS — I50.9 CHRONIC CONGESTIVE HEART FAILURE, UNSPECIFIED HEART FAILURE TYPE (HCC): ICD-10-CM

## 2023-11-08 RX ORDER — CARVEDILOL 25 MG/1
25 TABLET ORAL 2 TIMES DAILY
Qty: 180 TABLET | Refills: 1 | Status: CANCELLED | OUTPATIENT
Start: 2023-11-08

## 2023-11-10 DIAGNOSIS — I50.9 CHRONIC CONGESTIVE HEART FAILURE, UNSPECIFIED HEART FAILURE TYPE (HCC): ICD-10-CM

## 2023-11-10 DIAGNOSIS — I10 PRIMARY HYPERTENSION: ICD-10-CM

## 2023-11-10 RX ORDER — CARVEDILOL 25 MG/1
25 TABLET ORAL 2 TIMES DAILY
Qty: 60 TABLET | Refills: 0 | Status: SHIPPED | OUTPATIENT
Start: 2023-11-10

## 2023-11-11 ENCOUNTER — HOSPITAL ENCOUNTER (EMERGENCY)
Facility: HOSPITAL | Age: 78
Discharge: HOME | End: 2023-11-11
Attending: HOSPITALIST
Payer: MEDICARE

## 2023-11-11 VITALS
SYSTOLIC BLOOD PRESSURE: 126 MMHG | TEMPERATURE: 98.8 F | BODY MASS INDEX: 30.36 KG/M2 | OXYGEN SATURATION: 98 % | RESPIRATION RATE: 16 BRPM | WEIGHT: 165 LBS | DIASTOLIC BLOOD PRESSURE: 70 MMHG | HEART RATE: 72 BPM | HEIGHT: 62 IN

## 2023-11-11 DIAGNOSIS — U07.1 COVID-19: Primary | ICD-10-CM

## 2023-11-11 LAB
FLUAV AG NPH QL: NEGATIVE
FLUBV AG NPH QL: NEGATIVE
SARS-COV-2 RDRP RESP QL NAA+PROBE: DETECTED

## 2023-11-11 PROCEDURE — 87804 INFLUENZA ASSAY W/OPTIC: CPT

## 2023-11-11 PROCEDURE — 87635 SARS-COV-2 COVID-19 AMP PRB: CPT

## 2023-11-11 PROCEDURE — 99283 EMERGENCY DEPT VISIT LOW MDM: CPT

## 2023-11-11 ASSESSMENT — PAIN DESCRIPTION - PAIN TYPE: TYPE: ACUTE PAIN

## 2023-11-11 ASSESSMENT — PAIN - FUNCTIONAL ASSESSMENT: PAIN_FUNCTIONAL_ASSESSMENT: 0-10

## 2023-11-11 ASSESSMENT — PATIENT HEALTH QUESTIONNAIRE - PHQ9
SUM OF ALL RESPONSES TO PHQ9 QUESTIONS 1 & 2: 0
SUM OF ALL RESPONSES TO PHQ QUESTIONS 1-9: 0
2. FEELING DOWN, DEPRESSED OR HOPELESS: 0
SUM OF ALL RESPONSES TO PHQ QUESTIONS 1-9: 0
1. LITTLE INTEREST OR PLEASURE IN DOING THINGS: 0

## 2023-11-11 ASSESSMENT — PAIN DESCRIPTION - LOCATION: LOCATION: HEAD

## 2023-11-11 ASSESSMENT — PAIN DESCRIPTION - DESCRIPTORS: DESCRIPTORS: ACHING

## 2023-11-11 ASSESSMENT — PAIN SCALES - GENERAL: PAINLEVEL_OUTOF10: 7

## 2023-11-11 ASSESSMENT — LIFESTYLE VARIABLES: HOW OFTEN DO YOU HAVE A DRINK CONTAINING ALCOHOL: NEVER

## 2023-11-11 NOTE — ED PROVIDER NOTES
evaluation work-up. She was also given instructions that she needs to contact her surgeon and let them know that she is COVID-positive because most likely she would not be able to have surgery on Tuesday and they would reschedule this. Patient advised of the 10-day quarantine and she does state her understanding. CONSULTS: (Who and What was discussed)  None    Discussion with Other Profesionals : None    Social Determinants : None    Chronic Conditions:  has a past medical history of Anxiety, Arthritis, Asthma, Atherosclerotic heart disease of native coronary artery with unspecified angina pectoris (4/4/2023), Diabetes mellitus ([address]), GERD (gastroesophageal reflux disease), Hyperlipidemia, Hypertension, Hypothyroidism, Melanoma ([address]) (2003), Osteoporosis, Sleep apnea, and Thyroid nodule. Records Reviewed : None    Disposition Considerations (include 1 Tests not done, Shared Decision Making, Pt Expectation of Test or Tx.): Patient agreeable to have COVID and influenza swab performed  Appropriate for outpatient management with PCP follow-up and Paxlovid for COVID-positive status. I am the Primary Clinician of Record. FINAL IMPRESSION      1. COVID-19          DISPOSITION/PLAN     DISPOSITION Decision To Discharge 11/11/2023 05:36:19 PM      PATIENT REFERRED TO:  MICKI Alexandre  [address] [facility]  [phone]    In 2 days      [facility]  [address] [facility] [facility]  [phone]    As needed, If symptoms worsen      DISCHARGE MEDICATIONS:  New Prescriptions    NIRMATRELVIR/RITONAVIR 300/100 (PAXLOVID, 300/100,) 20 X 150 MG & 10 X 100MG TBPK    Take 3 tablets (two 150 mg nirmatrelvir and one 100 mg ritonavir tablets) by mouth every 12 hours for 5 days.        DISCONTINUED MEDICATIONS:  Discontinued Medications    No medications on file              (Please note that portions of this note were completed with a voice recognition program.

## 2023-11-11 NOTE — ED NOTES
Registration was at lunch. The patient signed in. She stated that she had allergies and was supposed to have surgery Tuesday. She wanted to be seen in the ER. She denied Chest Pain, so I asked her to have a seat until she could be registered.  DW, RRT

## 2023-11-11 NOTE — ED NOTES
Reviewed discharge plan with Meredith Saez. Encouraged her to f/u with MIKCI Gottlieb and she understood. NAD noted on discharge, gait steady. Reviewed discharge prescription for:     Current Discharge Medication List        START taking these medications    Details   nirmatrelvir/ritonavir 300/100 (PAXLOVID, 300/100,) 20 x 150 MG & 10 x 100MG TBPK Take 3 tablets (two 150 mg nirmatrelvir and one 100 mg ritonavir tablets) by mouth every 12 hours for 5 days. Qty: 30 tablet, Refills: 0             Hipolito Conn states understanding of how and when to take medications.       Electronically signed by Nini Ferraro RN on 11/11/2023 at 6:19 PM       Nini Ferraro RN  11/11/23 2227

## 2023-11-17 ENCOUNTER — OFFICE VISIT (OUTPATIENT)
Dept: PRIMARY CARE CLINIC | Age: 78
End: 2023-11-17
Payer: OTHER GOVERNMENT

## 2023-11-17 ENCOUNTER — HOSPITAL ENCOUNTER (OUTPATIENT)
Dept: GENERAL RADIOLOGY | Facility: HOSPITAL | Age: 78
Discharge: HOME | End: 2023-11-17
Payer: MEDICARE

## 2023-11-17 ENCOUNTER — HOSPITAL ENCOUNTER (OUTPATIENT)
Facility: HOSPITAL | Age: 78
Discharge: HOME | End: 2023-11-17
Payer: MEDICARE

## 2023-11-17 VITALS — OXYGEN SATURATION: 96 % | TEMPERATURE: 98 F | HEART RATE: 74 BPM

## 2023-11-17 DIAGNOSIS — R06.02 SHORTNESS OF BREATH: ICD-10-CM

## 2023-11-17 DIAGNOSIS — J20.9 ACUTE BRONCHITIS, UNSPECIFIED ORGANISM: ICD-10-CM

## 2023-11-17 DIAGNOSIS — R06.02 SHORTNESS OF BREATH: Primary | ICD-10-CM

## 2023-11-17 DIAGNOSIS — U09.9 POST COVID-19 CONDITION, UNSPECIFIED: ICD-10-CM

## 2023-11-17 PROCEDURE — 71046 X-RAY EXAM CHEST 2 VIEWS: CPT

## 2023-11-17 PROCEDURE — 1123F ACP DISCUSS/DSCN MKR DOCD: CPT | Performed by: NURSE PRACTITIONER

## 2023-11-17 PROCEDURE — 99213 OFFICE O/P EST LOW 20 MIN: CPT | Performed by: NURSE PRACTITIONER

## 2023-11-17 RX ORDER — DOXYCYCLINE HYCLATE 100 MG
100 TABLET ORAL 2 TIMES DAILY
Qty: 14 TABLET | Refills: 0 | Status: SHIPPED | OUTPATIENT
Start: 2023-11-17 | End: 2023-11-24

## 2023-11-17 ASSESSMENT — ENCOUNTER SYMPTOMS
SORE THROAT: 0
VOMITING: 0
BACK PAIN: 0
WHEEZING: 0
SHORTNESS OF BREATH: 0
EYE DISCHARGE: 0
COUGH: 1
SINUS PRESSURE: 0
ABDOMINAL PAIN: 0
NAUSEA: 0

## 2023-12-03 NOTE — ED TRIAGE NOTES
Pt states she has an allergy problems and is scheduled for surgery on Tuesday.   She is here to get a covid test.  She c/o sneezing, runny nose, watery eyes,  coughing for 2 days Normal for race

## 2024-01-04 DIAGNOSIS — I50.9 CHRONIC CONGESTIVE HEART FAILURE, UNSPECIFIED HEART FAILURE TYPE (HCC): ICD-10-CM

## 2024-01-04 DIAGNOSIS — R41.3 MEMORY LOSS: ICD-10-CM

## 2024-01-04 DIAGNOSIS — I10 PRIMARY HYPERTENSION: ICD-10-CM

## 2024-01-04 RX ORDER — SACUBITRIL AND VALSARTAN 97; 103 MG/1; MG/1
1 TABLET, FILM COATED ORAL 2 TIMES DAILY
Qty: 180 TABLET | Refills: 2 | Status: SHIPPED | OUTPATIENT
Start: 2024-01-04

## 2024-01-04 RX ORDER — HYDROXYZINE PAMOATE 25 MG/1
25 CAPSULE ORAL 3 TIMES DAILY PRN
Qty: 180 CAPSULE | Refills: 1 | Status: SHIPPED | OUTPATIENT
Start: 2024-01-04

## 2024-01-04 RX ORDER — CARVEDILOL 25 MG/1
25 TABLET ORAL 2 TIMES DAILY
Qty: 180 TABLET | Refills: 2 | Status: SHIPPED | OUTPATIENT
Start: 2024-01-04

## 2024-01-04 RX ORDER — DONEPEZIL HYDROCHLORIDE 10 MG/1
TABLET, FILM COATED ORAL
Qty: 90 TABLET | Refills: 2 | Status: SHIPPED | OUTPATIENT
Start: 2024-01-04

## 2024-01-10 ENCOUNTER — OFFICE VISIT (OUTPATIENT)
Dept: PRIMARY CARE CLINIC | Age: 79
End: 2024-01-10
Payer: MEDICARE

## 2024-01-10 VITALS
SYSTOLIC BLOOD PRESSURE: 134 MMHG | WEIGHT: 168.2 LBS | HEIGHT: 62 IN | BODY MASS INDEX: 30.95 KG/M2 | RESPIRATION RATE: 14 BRPM | OXYGEN SATURATION: 99 % | HEART RATE: 74 BPM | DIASTOLIC BLOOD PRESSURE: 72 MMHG | TEMPERATURE: 97.4 F

## 2024-01-10 DIAGNOSIS — J30.9 ALLERGIC RHINITIS, UNSPECIFIED SEASONALITY, UNSPECIFIED TRIGGER: ICD-10-CM

## 2024-01-10 DIAGNOSIS — Z13.29 THYROID DISORDER SCREEN: ICD-10-CM

## 2024-01-10 DIAGNOSIS — I10 PRIMARY HYPERTENSION: ICD-10-CM

## 2024-01-10 DIAGNOSIS — E78.00 PURE HYPERCHOLESTEROLEMIA: ICD-10-CM

## 2024-01-10 DIAGNOSIS — M81.0 OSTEOPOROSIS, UNSPECIFIED OSTEOPOROSIS TYPE, UNSPECIFIED PATHOLOGICAL FRACTURE PRESENCE: ICD-10-CM

## 2024-01-10 DIAGNOSIS — F32.A DEPRESSION, UNSPECIFIED DEPRESSION TYPE: ICD-10-CM

## 2024-01-10 DIAGNOSIS — K21.9 GASTROESOPHAGEAL REFLUX DISEASE, UNSPECIFIED WHETHER ESOPHAGITIS PRESENT: ICD-10-CM

## 2024-01-10 DIAGNOSIS — E55.9 VITAMIN D DEFICIENCY: ICD-10-CM

## 2024-01-10 DIAGNOSIS — E11.9 TYPE 2 DIABETES MELLITUS WITHOUT COMPLICATION, WITHOUT LONG-TERM CURRENT USE OF INSULIN (HCC): Primary | ICD-10-CM

## 2024-01-10 DIAGNOSIS — G47.33 OBSTRUCTIVE SLEEP APNEA SYNDROME: ICD-10-CM

## 2024-01-10 DIAGNOSIS — I25.119 ATHEROSCLEROSIS OF NATIVE CORONARY ARTERY OF NATIVE HEART WITH ANGINA PECTORIS (HCC): ICD-10-CM

## 2024-01-10 DIAGNOSIS — R07.89 ATYPICAL CHEST PAIN: ICD-10-CM

## 2024-01-10 DIAGNOSIS — I50.9 CHRONIC CONGESTIVE HEART FAILURE, UNSPECIFIED HEART FAILURE TYPE (HCC): ICD-10-CM

## 2024-01-10 DIAGNOSIS — R41.3 MEMORY LOSS: ICD-10-CM

## 2024-01-10 PROCEDURE — G8427 DOCREV CUR MEDS BY ELIG CLIN: HCPCS | Performed by: NURSE PRACTITIONER

## 2024-01-10 PROCEDURE — 3078F DIAST BP <80 MM HG: CPT | Performed by: NURSE PRACTITIONER

## 2024-01-10 PROCEDURE — 99214 OFFICE O/P EST MOD 30 MIN: CPT | Performed by: NURSE PRACTITIONER

## 2024-01-10 PROCEDURE — 1036F TOBACCO NON-USER: CPT | Performed by: NURSE PRACTITIONER

## 2024-01-10 PROCEDURE — G8399 PT W/DXA RESULTS DOCUMENT: HCPCS | Performed by: NURSE PRACTITIONER

## 2024-01-10 PROCEDURE — G8417 CALC BMI ABV UP PARAM F/U: HCPCS | Performed by: NURSE PRACTITIONER

## 2024-01-10 PROCEDURE — 3075F SYST BP GE 130 - 139MM HG: CPT | Performed by: NURSE PRACTITIONER

## 2024-01-10 PROCEDURE — 1123F ACP DISCUSS/DSCN MKR DOCD: CPT | Performed by: NURSE PRACTITIONER

## 2024-01-10 PROCEDURE — G8484 FLU IMMUNIZE NO ADMIN: HCPCS | Performed by: NURSE PRACTITIONER

## 2024-01-10 PROCEDURE — 1090F PRES/ABSN URINE INCON ASSESS: CPT | Performed by: NURSE PRACTITIONER

## 2024-01-10 RX ORDER — HYDROXYZINE PAMOATE 25 MG/1
25 CAPSULE ORAL 3 TIMES DAILY PRN
Qty: 180 CAPSULE | Refills: 1 | Status: SHIPPED | OUTPATIENT
Start: 2024-01-10

## 2024-01-10 RX ORDER — LEVOTHYROXINE SODIUM 0.05 MG/1
50 TABLET ORAL DAILY
Qty: 90 TABLET | Refills: 2 | Status: SHIPPED | OUTPATIENT
Start: 2024-01-10

## 2024-01-10 RX ORDER — ESOMEPRAZOLE MAGNESIUM 40 MG/1
CAPSULE, DELAYED RELEASE ORAL
Qty: 90 CAPSULE | Refills: 3 | Status: SHIPPED | OUTPATIENT
Start: 2024-01-10

## 2024-01-10 RX ORDER — FLUTICASONE PROPIONATE 50 MCG
SPRAY, SUSPENSION (ML) NASAL
Qty: 16 G | Refills: 3 | Status: SHIPPED | OUTPATIENT
Start: 2024-01-10

## 2024-01-10 RX ORDER — MONTELUKAST SODIUM 10 MG/1
TABLET ORAL
Qty: 90 TABLET | Refills: 3 | Status: SHIPPED | OUTPATIENT
Start: 2024-01-10

## 2024-01-10 RX ORDER — NITROGLYCERIN 0.4 MG/1
0.4 TABLET SUBLINGUAL PRN
Qty: 25 TABLET | Refills: 3 | Status: SHIPPED | OUTPATIENT
Start: 2024-01-10

## 2024-01-10 RX ORDER — ATORVASTATIN CALCIUM 20 MG/1
20 TABLET, FILM COATED ORAL DAILY
Qty: 90 TABLET | Refills: 3 | Status: SHIPPED | OUTPATIENT
Start: 2024-01-10

## 2024-01-10 RX ORDER — DONEPEZIL HYDROCHLORIDE 10 MG/1
TABLET, FILM COATED ORAL
Qty: 90 TABLET | Refills: 2 | Status: SHIPPED | OUTPATIENT
Start: 2024-01-10

## 2024-01-10 RX ORDER — CARVEDILOL 25 MG/1
25 TABLET ORAL 2 TIMES DAILY
Qty: 180 TABLET | Refills: 2 | Status: SHIPPED | OUTPATIENT
Start: 2024-01-10

## 2024-01-10 RX ORDER — ALENDRONATE SODIUM 70 MG/1
TABLET ORAL
Qty: 12 TABLET | Refills: 3 | Status: SHIPPED | OUTPATIENT
Start: 2024-01-10

## 2024-01-10 RX ORDER — SACUBITRIL AND VALSARTAN 97; 103 MG/1; MG/1
1 TABLET, FILM COATED ORAL 2 TIMES DAILY
Qty: 180 TABLET | Refills: 2 | Status: SHIPPED | OUTPATIENT
Start: 2024-01-10

## 2024-01-10 RX ORDER — ERGOCALCIFEROL 1.25 MG/1
50000 CAPSULE ORAL WEEKLY
Qty: 12 CAPSULE | Refills: 3 | Status: SHIPPED | OUTPATIENT
Start: 2024-01-10

## 2024-01-10 RX ORDER — FEXOFENADINE HCL 180 MG/1
180 TABLET ORAL DAILY
Qty: 90 TABLET | Refills: 3 | Status: SHIPPED | OUTPATIENT
Start: 2024-01-10

## 2024-01-10 RX ORDER — ESCITALOPRAM OXALATE 10 MG/1
TABLET ORAL
Qty: 90 TABLET | Refills: 3 | Status: SHIPPED | OUTPATIENT
Start: 2024-01-10

## 2024-01-10 ASSESSMENT — PATIENT HEALTH QUESTIONNAIRE - PHQ9
SUM OF ALL RESPONSES TO PHQ QUESTIONS 1-9: 3
SUM OF ALL RESPONSES TO PHQ9 QUESTIONS 1 & 2: 0
2. FEELING DOWN, DEPRESSED OR HOPELESS: 0
1. LITTLE INTEREST OR PLEASURE IN DOING THINGS: 0
4. FEELING TIRED OR HAVING LITTLE ENERGY: 1
SUM OF ALL RESPONSES TO PHQ QUESTIONS 1-9: 3
SUM OF ALL RESPONSES TO PHQ QUESTIONS 1-9: 3
10. IF YOU CHECKED OFF ANY PROBLEMS, HOW DIFFICULT HAVE THESE PROBLEMS MADE IT FOR YOU TO DO YOUR WORK, TAKE CARE OF THINGS AT HOME, OR GET ALONG WITH OTHER PEOPLE: 0
9. THOUGHTS THAT YOU WOULD BE BETTER OFF DEAD, OR OF HURTING YOURSELF: 0
8. MOVING OR SPEAKING SO SLOWLY THAT OTHER PEOPLE COULD HAVE NOTICED. OR THE OPPOSITE, BEING SO FIGETY OR RESTLESS THAT YOU HAVE BEEN MOVING AROUND A LOT MORE THAN USUAL: 0
5. POOR APPETITE OR OVEREATING: 1
3. TROUBLE FALLING OR STAYING ASLEEP: 0
6. FEELING BAD ABOUT YOURSELF - OR THAT YOU ARE A FAILURE OR HAVE LET YOURSELF OR YOUR FAMILY DOWN: 0
SUM OF ALL RESPONSES TO PHQ QUESTIONS 1-9: 3
7. TROUBLE CONCENTRATING ON THINGS, SUCH AS READING THE NEWSPAPER OR WATCHING TELEVISION: 1

## 2024-01-10 ASSESSMENT — ENCOUNTER SYMPTOMS
ALLERGIC/IMMUNOLOGIC NEGATIVE: 1
EYES NEGATIVE: 1
GASTROINTESTINAL NEGATIVE: 1
RESPIRATORY NEGATIVE: 1

## 2024-01-10 NOTE — PROGRESS NOTES
Chief Complaint   Patient presents with    Hypertension    Diabetes    Hypothyroidism    Sleep Apnea       Have you seen any other physician or provider since your last visit no    Have you had any other diagnostic tests since your last visit? no    Have you changed or stopped any medications since your last visit? no        SUBJECTIVE:    Patient ID:Jackie Byrd is a 78 y.o. female.    Chief Complaint   Patient presents with    Hypertension    Diabetes    Hypothyroidism    Sleep Apnea     HPI:  Patient was diagnosed with sleep apnea 10 years ago by Dr Perez. She does not know what company her CPAP machine came from. She is in need of a new one.     Patient has had hypertension for few years. She has been compliant with taking medications, without side effects from it. She has been following a low-sodium, is active and never exercises.  Weight is fluctuating a bit, compared to last visit. Her blood pressure is elevated 159/79 at this time.    Patient has had diabetes for the past few years.  She has been compliant with the medications and denies any side effects from it. She has been monitoring fingersticks on a daily basis.  Her fingerstick range is between 124. She denies any hypoglycemic symptoms. She has been following a diabetic diet and has  been active.  Her last eye exam was less than a year ago.  She is using oral meds only.     She is having a blepharectomy on the 16 th.   Patient's medications, allergies, past medical, surgical, social and family histories were reviewed and updated as appropriate.          Review of Systems   Constitutional: Negative.    HENT: Negative.     Eyes: Negative.    Respiratory: Negative.     Cardiovascular: Negative.    Gastrointestinal: Negative.    Endocrine: Negative.    Genitourinary: Negative.    Musculoskeletal: Negative.    Skin: Negative.    Allergic/Immunologic: Negative.    Neurological: Negative.    Hematological: Negative.    Psychiatric/Behavioral: Negative.

## 2024-02-22 ENCOUNTER — OFFICE VISIT (OUTPATIENT)
Dept: CARDIOLOGY | Facility: CLINIC | Age: 79
End: 2024-02-22
Payer: MEDICARE

## 2024-02-22 VITALS
BODY MASS INDEX: 30.91 KG/M2 | HEART RATE: 64 BPM | WEIGHT: 168 LBS | SYSTOLIC BLOOD PRESSURE: 152 MMHG | OXYGEN SATURATION: 98 % | DIASTOLIC BLOOD PRESSURE: 64 MMHG | HEIGHT: 62 IN

## 2024-02-22 DIAGNOSIS — E78.2 MIXED HYPERLIPIDEMIA: ICD-10-CM

## 2024-02-22 DIAGNOSIS — I10 ESSENTIAL HYPERTENSION: Primary | ICD-10-CM

## 2024-02-22 DIAGNOSIS — I50.32 CHRONIC HEART FAILURE WITH PRESERVED EJECTION FRACTION (HFPEF): ICD-10-CM

## 2024-02-22 RX ORDER — FERROUS SULFATE 325(65) MG
325 TABLET ORAL
COMMUNITY

## 2024-02-29 ENCOUNTER — HOSPITAL ENCOUNTER (OUTPATIENT)
Dept: SLEEP CENTER | Facility: HOSPITAL | Age: 79
Discharge: HOME | End: 2024-03-02
Payer: MEDICARE

## 2024-02-29 DIAGNOSIS — G47.33 OSA (OBSTRUCTIVE SLEEP APNEA): Primary | ICD-10-CM

## 2024-02-29 PROCEDURE — 99203 OFFICE O/P NEW LOW 30 MIN: CPT | Performed by: INTERNAL MEDICINE

## 2024-02-29 NOTE — PROGRESS NOTES
Pulmonary and Sleep Medicine    Jackie Byrd (:  1945) is a 79 y.o. female,New patient, here for evaluation of the following chief complaint(s):  No chief complaint on file.      Referring physician:  Dipika Pacheco [address] [place] [address]     ASSESSMENT/PLAN:    Obstructive sleep apnea.  Hypersomnia.        We will order a home sleep study.  Reevaluate after the sleep study.       Umm Solano MD, [facility], [facility]    No follow-ups on file.    SUBJECTIVE/OBJECTIVE:  HPI    The patient was evaluated today via video link.  The patient's location is [facility] sleep lab in [facility].  My location is my office at [facility] in [place]  The patient has a history of obstructive sleep apnea.  She has been on CPAP for more than 10 years.  Her CPAP machine is also about 10 years old.  She has a CPAP mask that she has been using for about 3 years.  She says that she cannot sleep without her CPAP machine.  She wakes up gasping for breath.  She would like to have a new machine.  Her initial sleep study is not available anymore.  She does not know where she got her CPAP initially.    Prior to Visit Medications    Medication Sig Taking? Authorizing Provider   donepezil (ARICEPT) 10 MG tablet Take 1 tablet by mouth nightly  Provider, MD Sonu   Misc. Devices (CPAP MACHINE) MISC by Does not apply route at bedtime Dr Perez  ProviderSonu MD   sacubitril-valsartan (ENTRESTO)  MG per tablet Take 1 tablet by mouth 2 times daily  Dipika Pacheco APRN   nitroGLYCERIN (NITROSTAT) 0.4 MG SL tablet Place 1 tablet under the tongue as needed for Chest pain  Dipika Pacheco APRN   montelukast (SINGULAIR) 10 MG tablet take 1 tablet by mouth at bedtime  Dipika Pacheco APRN   metFORMIN (GLUCOPHAGE) 500 MG tablet TAKE ONE TABLET BY MOUTH THREE TIMES A DAY WITH FOOD  Dipika Pacheco APRN   levothyroxine (SYNTHROID) 50 MCG tablet Take 1 tablet

## 2024-03-14 DIAGNOSIS — E11.9 TYPE 2 DIABETES MELLITUS WITHOUT COMPLICATION, WITHOUT LONG-TERM CURRENT USE OF INSULIN (HCC): ICD-10-CM

## 2024-04-10 ENCOUNTER — HOSPITAL ENCOUNTER (OUTPATIENT)
Dept: SLEEP CENTER | Facility: HOSPITAL | Age: 79
Discharge: HOME | End: 2024-04-12
Payer: MEDICARE

## 2024-04-10 DIAGNOSIS — G47.33 OSA (OBSTRUCTIVE SLEEP APNEA): ICD-10-CM

## 2024-04-10 PROCEDURE — 95806 SLEEP STUDY UNATT&RESP EFFT: CPT

## 2024-04-11 ENCOUNTER — HOSPITAL ENCOUNTER (OUTPATIENT)
Facility: HOSPITAL | Age: 79
Discharge: HOME | End: 2024-04-11
Payer: MEDICARE

## 2024-04-11 DIAGNOSIS — E55.9 VITAMIN D DEFICIENCY: ICD-10-CM

## 2024-04-11 DIAGNOSIS — Z13.29 THYROID DISORDER SCREEN: ICD-10-CM

## 2024-04-11 DIAGNOSIS — E11.9 TYPE 2 DIABETES MELLITUS WITHOUT COMPLICATION, WITHOUT LONG-TERM CURRENT USE OF INSULIN (HCC): ICD-10-CM

## 2024-04-11 DIAGNOSIS — E78.00 PURE HYPERCHOLESTEROLEMIA: ICD-10-CM

## 2024-04-11 LAB
25(OH)D3 SERPL-MCNC: 17.1 NG/ML (ref 32–100)
ALBUMIN SERPL-MCNC: 4.4 G/DL (ref 3.4–4.8)
ALBUMIN/GLOB SERPL: 2.1 {RATIO} (ref 0.8–2)
ALP SERPL-CCNC: 143 U/L (ref 25–100)
ALT SERPL-CCNC: 18 U/L (ref 4–36)
ANION GAP SERPL CALCULATED.3IONS-SCNC: 10 MMOL/L (ref 3–16)
AST SERPL-CCNC: 14 U/L (ref 8–33)
BILIRUB SERPL-MCNC: 0.5 MG/DL (ref 0.3–1.2)
BUN SERPL-MCNC: 16 MG/DL (ref 6–20)
CALCIUM SERPL-MCNC: 9.2 MG/DL (ref 8.5–10.5)
CHLORIDE SERPL-SCNC: 102 MMOL/L (ref 98–107)
CHOLEST SERPL-MCNC: 148 MG/DL (ref 0–200)
CO2 SERPL-SCNC: 26 MMOL/L (ref 20–30)
CREAT SERPL-MCNC: 1.1 MG/DL (ref 0.4–1.2)
ERYTHROCYTE [DISTWIDTH] IN BLOOD BY AUTOMATED COUNT: 12.4 % (ref 11–16)
FOLATE SERPL-MCNC: 12.76 NG/ML
GFR SERPLBLD CREATININE-BSD FMLA CKD-EPI: 52 ML/MIN/{1.73_M2}
GLOBULIN SER CALC-MCNC: 2.1 G/DL
GLUCOSE SERPL-MCNC: 103 MG/DL (ref 74–106)
HBA1C MFR BLD: 7 %
HCT VFR BLD AUTO: 38.1 % (ref 37–47)
HDLC SERPL-MCNC: 53 MG/DL (ref 40–60)
HGB BLD-MCNC: 12.1 G/DL (ref 11.5–16.5)
LDLC SERPL CALC-MCNC: 67 MG/DL
MCH RBC QN AUTO: 28.3 PG (ref 27–32)
MCHC RBC AUTO-ENTMCNC: 31.8 G/DL (ref 31–35)
MCV RBC AUTO: 89.2 FL (ref 80–100)
PLATELET # BLD AUTO: 242 K/UL (ref 150–400)
PMV BLD AUTO: 10.9 FL (ref 6–10)
POTASSIUM SERPL-SCNC: 4.8 MMOL/L (ref 3.4–5.1)
PROT SERPL-MCNC: 6.5 G/DL (ref 6.4–8.3)
RBC # BLD AUTO: 4.27 M/UL (ref 3.8–5.8)
SODIUM SERPL-SCNC: 138 MMOL/L (ref 136–145)
TRIGL SERPL-MCNC: 139 MG/DL (ref 0–249)
TSH SERPL DL<=0.005 MIU/L-ACNC: 2.82 UIU/ML (ref 0.27–4.2)
VIT B12 SERPL-MCNC: 412 PG/ML (ref 211–911)
VLDLC SERPL CALC-MCNC: 28 MG/DL
WBC # BLD AUTO: 6 K/UL (ref 4–11)

## 2024-04-11 PROCEDURE — 80061 LIPID PANEL: CPT

## 2024-04-11 PROCEDURE — 85027 COMPLETE CBC AUTOMATED: CPT

## 2024-04-11 PROCEDURE — 82746 ASSAY OF FOLIC ACID SERUM: CPT

## 2024-04-11 PROCEDURE — 82607 VITAMIN B-12: CPT

## 2024-04-11 PROCEDURE — 80053 COMPREHEN METABOLIC PANEL: CPT

## 2024-04-11 PROCEDURE — 84443 ASSAY THYROID STIM HORMONE: CPT

## 2024-04-11 PROCEDURE — 83036 HEMOGLOBIN GLYCOSYLATED A1C: CPT

## 2024-04-11 PROCEDURE — 82306 VITAMIN D 25 HYDROXY: CPT

## 2024-04-16 ENCOUNTER — OFFICE VISIT (OUTPATIENT)
Dept: PRIMARY CARE CLINIC | Age: 79
End: 2024-04-16
Payer: MEDICARE

## 2024-04-16 VITALS
OXYGEN SATURATION: 95 % | DIASTOLIC BLOOD PRESSURE: 72 MMHG | WEIGHT: 169 LBS | HEART RATE: 64 BPM | BODY MASS INDEX: 30.9 KG/M2 | SYSTOLIC BLOOD PRESSURE: 122 MMHG

## 2024-04-16 DIAGNOSIS — E11.22 TYPE 2 DIABETES MELLITUS WITH STAGE 3A CHRONIC KIDNEY DISEASE, WITHOUT LONG-TERM CURRENT USE OF INSULIN (HCC): ICD-10-CM

## 2024-04-16 DIAGNOSIS — I50.22 CHRONIC SYSTOLIC CONGESTIVE HEART FAILURE (HCC): ICD-10-CM

## 2024-04-16 DIAGNOSIS — F33.0 MAJOR DEPRESSIVE DISORDER, RECURRENT, MILD (HCC): ICD-10-CM

## 2024-04-16 DIAGNOSIS — E11.9 TYPE 2 DIABETES MELLITUS WITHOUT COMPLICATION, WITHOUT LONG-TERM CURRENT USE OF INSULIN (HCC): Primary | ICD-10-CM

## 2024-04-16 DIAGNOSIS — N18.31 TYPE 2 DIABETES MELLITUS WITH STAGE 3A CHRONIC KIDNEY DISEASE, WITHOUT LONG-TERM CURRENT USE OF INSULIN (HCC): ICD-10-CM

## 2024-04-16 PROCEDURE — G8427 DOCREV CUR MEDS BY ELIG CLIN: HCPCS | Performed by: NURSE PRACTITIONER

## 2024-04-16 PROCEDURE — 3051F HG A1C>EQUAL 7.0%<8.0%: CPT | Performed by: NURSE PRACTITIONER

## 2024-04-16 PROCEDURE — 3078F DIAST BP <80 MM HG: CPT | Performed by: NURSE PRACTITIONER

## 2024-04-16 PROCEDURE — 1123F ACP DISCUSS/DSCN MKR DOCD: CPT | Performed by: NURSE PRACTITIONER

## 2024-04-16 PROCEDURE — 1036F TOBACCO NON-USER: CPT | Performed by: NURSE PRACTITIONER

## 2024-04-16 PROCEDURE — G8399 PT W/DXA RESULTS DOCUMENT: HCPCS | Performed by: NURSE PRACTITIONER

## 2024-04-16 PROCEDURE — 1090F PRES/ABSN URINE INCON ASSESS: CPT | Performed by: NURSE PRACTITIONER

## 2024-04-16 PROCEDURE — 99214 OFFICE O/P EST MOD 30 MIN: CPT | Performed by: NURSE PRACTITIONER

## 2024-04-16 PROCEDURE — 3074F SYST BP LT 130 MM HG: CPT | Performed by: NURSE PRACTITIONER

## 2024-04-16 PROCEDURE — G8417 CALC BMI ABV UP PARAM F/U: HCPCS | Performed by: NURSE PRACTITIONER

## 2024-04-16 NOTE — PROGRESS NOTES
04/16/24 76.7 kg (169 lb)   01/10/24 76.3 kg (168 lb 3.2 oz)   11/11/23 74.8 kg (165 lb)     BP Readings from Last 3 Encounters:   04/16/24 122/72   01/10/24 134/72   11/11/23 126/70       /72 (Site: Left Upper Arm, Position: Sitting, Cuff Size: Medium Adult)   Pulse 64   Wt 76.7 kg (169 lb)   SpO2 95% Comment: room air  BMI 30.90 kg/m²      Physical Exam  Vitals and nursing note reviewed.   Constitutional:       Appearance: Normal appearance. She is well-developed.   HENT:      Head: Normocephalic and atraumatic.      Right Ear: External ear normal.      Left Ear: External ear normal.      Nose: Nose normal.      Mouth/Throat:      Mouth: Mucous membranes are moist.      Pharynx: Oropharynx is clear.   Eyes:      Conjunctiva/sclera: Conjunctivae normal.      Pupils: Pupils are equal, round, and reactive to light.   Neck:      Thyroid: No thyromegaly.      Vascular: No JVD.   Cardiovascular:      Rate and Rhythm: Normal rate and regular rhythm.      Heart sounds: Normal heart sounds.   Pulmonary:      Effort: Pulmonary effort is normal.      Breath sounds: Normal breath sounds. No wheezing or rales.   Abdominal:      General: Bowel sounds are normal. There is no distension.      Palpations: Abdomen is soft.      Tenderness: There is no abdominal tenderness.   Musculoskeletal:         General: No tenderness.      Cervical back: Neck supple. No rigidity. No muscular tenderness.      Right lower leg: No edema.      Left lower leg: No edema.   Skin:     Findings: No erythema or rash.   Neurological:      General: No focal deficit present.      Mental Status: She is alert and oriented to person, place, and time.   Psychiatric:         Behavior: Behavior normal.         Judgment: Judgment normal.         Lab Results   Component Value Date/Time     04/11/2024 10:08 AM    K 4.8 04/11/2024 10:08 AM    K 3.7 08/25/2022 11:14 AM     04/11/2024 10:08 AM    CO2 26 04/11/2024 10:08 AM    GLUCOSE 103

## 2024-05-01 ENCOUNTER — OFFICE VISIT (OUTPATIENT)
Dept: PRIMARY CARE CLINIC | Age: 79
End: 2024-05-01
Payer: MEDICARE

## 2024-05-01 ENCOUNTER — TELEPHONE (OUTPATIENT)
Dept: PRIMARY CARE CLINIC | Age: 79
End: 2024-05-01

## 2024-05-01 VITALS — OXYGEN SATURATION: 95 % | HEART RATE: 65 BPM | TEMPERATURE: 97.6 F

## 2024-05-01 DIAGNOSIS — J01.00 ACUTE NON-RECURRENT MAXILLARY SINUSITIS: ICD-10-CM

## 2024-05-01 DIAGNOSIS — R09.81 NASAL CONGESTION: ICD-10-CM

## 2024-05-01 DIAGNOSIS — R05.9 COUGH, UNSPECIFIED TYPE: ICD-10-CM

## 2024-05-01 DIAGNOSIS — R05.9 COUGH, UNSPECIFIED TYPE: Primary | ICD-10-CM

## 2024-05-01 LAB
INFLUENZA A ANTIBODY: NEGATIVE
INFLUENZA B ANTIBODY: NEGATIVE
Lab: NORMAL
QC PASS/FAIL: NORMAL
SARS-COV-2 RDRP RESP QL NAA+PROBE: NEGATIVE

## 2024-05-01 PROCEDURE — 1090F PRES/ABSN URINE INCON ASSESS: CPT | Performed by: PHYSICIAN ASSISTANT

## 2024-05-01 PROCEDURE — 87635 SARS-COV-2 COVID-19 AMP PRB: CPT | Performed by: PHYSICIAN ASSISTANT

## 2024-05-01 PROCEDURE — 87804 INFLUENZA ASSAY W/OPTIC: CPT | Performed by: PHYSICIAN ASSISTANT

## 2024-05-01 PROCEDURE — G8427 DOCREV CUR MEDS BY ELIG CLIN: HCPCS | Performed by: PHYSICIAN ASSISTANT

## 2024-05-01 PROCEDURE — G8399 PT W/DXA RESULTS DOCUMENT: HCPCS | Performed by: PHYSICIAN ASSISTANT

## 2024-05-01 PROCEDURE — 99213 OFFICE O/P EST LOW 20 MIN: CPT | Performed by: PHYSICIAN ASSISTANT

## 2024-05-01 PROCEDURE — G8417 CALC BMI ABV UP PARAM F/U: HCPCS | Performed by: PHYSICIAN ASSISTANT

## 2024-05-01 PROCEDURE — 1036F TOBACCO NON-USER: CPT | Performed by: PHYSICIAN ASSISTANT

## 2024-05-01 PROCEDURE — 1123F ACP DISCUSS/DSCN MKR DOCD: CPT | Performed by: PHYSICIAN ASSISTANT

## 2024-05-01 RX ORDER — FLUTICASONE PROPIONATE 50 MCG
2 SPRAY, SUSPENSION (ML) NASAL DAILY
Qty: 1 EACH | Refills: 0 | Status: SHIPPED | OUTPATIENT
Start: 2024-05-01

## 2024-05-01 RX ORDER — CEFDINIR 300 MG/1
300 CAPSULE ORAL 2 TIMES DAILY
Qty: 14 CAPSULE | Refills: 0 | Status: SHIPPED | OUTPATIENT
Start: 2024-05-01 | End: 2024-05-08

## 2024-05-01 RX ORDER — BENZONATATE 200 MG/1
200 CAPSULE ORAL 3 TIMES DAILY PRN
Qty: 30 CAPSULE | Refills: 0 | Status: SHIPPED | OUTPATIENT
Start: 2024-05-01

## 2024-05-01 ASSESSMENT — ENCOUNTER SYMPTOMS
COUGH: 1
GASTROINTESTINAL NEGATIVE: 1
RHINORRHEA: 1

## 2024-05-01 NOTE — TELEPHONE ENCOUNTER
Pt called stating that she thinks she needs something more than the cough med that you sent \"chest congestion with thick yellowish mucous\"   [facility]

## 2024-05-01 NOTE — PROGRESS NOTES
Chief Complaint   Patient presents with    Cough    Nasal Congestion    Congestion     Pt reports symptoms started about 2 days ago.        Have you seen any other physician or provider since your last visit no    Have you had any other diagnostic tests since your last visit? no    Have you changed or stopped any medications since your last visit? no     
acute worsening to go to the ER for evaluation and tx, or return to clinic for re-evaluation. Otherwise, pt should f/u with PCP. Pt verbally acknowledges understanding of results of any tests, procedures and/or imaging done at this visit.    Parts of this document/medical record have been dictated via Dragon [employer] speech recognition software, which may cause errors in spelling or accurate dictation. Attempts have been made to proofread and correct all errors. Please contact me with any questions.     No follow-ups on file.  Orders Placed This Encounter   Procedures    POCT COVID-19 Rapid, NAAT     Order Specific Question:   Date of Symptom Onset     Answer:   4/29/2024     Order Specific Question:   Reason for Test     Answer:   Symptomatic     Order Specific Question:   Pregnant?     Answer:   No    POCT Influenza A/B           Electronically signed by PATT Layton on 5/1/2024

## 2024-05-05 ENCOUNTER — APPOINTMENT (OUTPATIENT)
Dept: GENERAL RADIOLOGY | Facility: HOSPITAL | Age: 79
End: 2024-05-05
Attending: HOSPITALIST
Payer: MEDICARE

## 2024-05-05 ENCOUNTER — HOSPITAL ENCOUNTER (EMERGENCY)
Facility: HOSPITAL | Age: 79
Discharge: HOME | End: 2024-05-05
Attending: HOSPITALIST
Payer: MEDICARE

## 2024-05-05 VITALS
SYSTOLIC BLOOD PRESSURE: 120 MMHG | RESPIRATION RATE: 18 BRPM | OXYGEN SATURATION: 92 % | HEART RATE: 66 BPM | DIASTOLIC BLOOD PRESSURE: 65 MMHG | TEMPERATURE: 97.8 F

## 2024-05-05 DIAGNOSIS — J20.9 ACUTE BRONCHITIS, UNSPECIFIED ORGANISM: Primary | ICD-10-CM

## 2024-05-05 LAB
FLUAV AG NPH QL: NEGATIVE
FLUBV AG NPH QL: NEGATIVE
RSV AG NOSE QL: NEGATIVE
SARS-COV-2 RDRP RESP QL NAA+PROBE: NOT DETECTED

## 2024-05-05 PROCEDURE — 87807 RSV ASSAY W/OPTIC: CPT

## 2024-05-05 PROCEDURE — 87635 SARS-COV-2 COVID-19 AMP PRB: CPT

## 2024-05-05 PROCEDURE — 99284 EMERGENCY DEPT VISIT MOD MDM: CPT

## 2024-05-05 PROCEDURE — 87804 INFLUENZA ASSAY W/OPTIC: CPT

## 2024-05-05 PROCEDURE — 71045 X-RAY EXAM CHEST 1 VIEW: CPT

## 2024-05-05 RX ORDER — GUAIFENESIN 600 MG/1
1200 TABLET, EXTENDED RELEASE ORAL 2 TIMES DAILY
Qty: 28 TABLET | Refills: 0 | Status: SHIPPED | OUTPATIENT
Start: 2024-05-05 | End: 2024-05-12

## 2024-05-05 RX ORDER — PREDNISONE 20 MG/1
20 TABLET ORAL DAILY
Qty: 5 TABLET | Refills: 0 | Status: SHIPPED | OUTPATIENT
Start: 2024-05-05 | End: 2024-05-10

## 2024-05-05 RX ORDER — ALBUTEROL SULFATE 90 UG/1
2 AEROSOL, METERED RESPIRATORY (INHALATION) 4 TIMES DAILY PRN
Qty: 18 G | Refills: 0 | Status: SHIPPED | OUTPATIENT
Start: 2024-05-05

## 2024-05-05 NOTE — ED TRIAGE NOTES
Pt reports that she was seen at walk in clinic last week, was diagnosed with allergies, reports that she wasn't happy with her POC because she wasn't started on antibiotic.     RN provided education when antibiotic are prescribed, pt was not receptive of education, states that she understands that but still should hav been started on antibiotic because \"I always end up need one\"    Pt reports she has still had runny nose, cough, congestion, reports is now a productive cough with yellow sputum.

## 2024-05-05 NOTE — ED PROVIDER NOTES
[facility]  EMERGENCY DEPARTMENT ENCOUNTER        Pt Name: Jackie Byrd  MRN: [id number]  Birthdate 1945  Date of evaluation: 5/5/2024  Provider: Shawn King DO  PCP: Dipika Pacheco APRN  Note Started: 12:52 PM EDT 5/5/24    CHIEF COMPLAINT       Chief Complaint   Patient presents with    Cough       HISTORY OF PRESENT ILLNESS: 1 or more Elements     History from : Patient    Limitations to history : None    Jackie Byrd is a 79 y.o. female who presents to emergency department for cough, nasal congestion and bodyaches.  Patient states that symptoms ongoing for at least a week.  She denies fevers or chills with the symptoms.  She states she does have a headache when she coughs.  She has had some fullness to her ears but no actual pain.  Positive for nasal drainage and discharge.  She states that her throat is a little irritated but secondary to the cough.  She denies loss of taste or smell.  She states that her cough is productive.  She states that she does feel short of breath at times specially if she has a fit of coughing.  Denies any actual true chest pain with this.  She states she has had some sensation of some nausea today but no vomiting or diarrhea.  Denies any abdominal pain.  Patient denies any dysuria change urinary frequency.  No body aches out of ordinary.  Denies any sick contacts that she is aware of.  She states that she is vaccinated for COVID and for influenza.  Past medical history pertinent for anxiety, arthritis, asthma, atherosclerotic heart disease, diabetes mellitus, GERD, hyperlipidemia, hypertension, hypothyroidism, melanoma of the skin, osteoporosis, sleep apnea and thyroid nodule    Nursing Notes were all reviewed and agreed with or any disagreements were addressed in the HPI.    REVIEW OF SYSTEMS :      Review of Systems    Review of systems reviewed and negative except as in HPI/MDM    PAST MEDICAL HISTORY     Past Medical History:

## 2024-06-18 DIAGNOSIS — E11.9 TYPE 2 DIABETES MELLITUS WITHOUT COMPLICATION, WITHOUT LONG-TERM CURRENT USE OF INSULIN (HCC): ICD-10-CM

## 2024-06-18 RX ORDER — HYDROXYZINE PAMOATE 25 MG/1
CAPSULE ORAL
Qty: 180 CAPSULE | Refills: 1 | Status: SHIPPED | OUTPATIENT
Start: 2024-06-18

## 2024-07-15 ENCOUNTER — HOSPITAL ENCOUNTER (EMERGENCY)
Facility: HOSPITAL | Age: 79
Discharge: HOME | End: 2024-07-15
Attending: EMERGENCY MEDICINE
Payer: MEDICARE

## 2024-07-15 ENCOUNTER — APPOINTMENT (OUTPATIENT)
Dept: GENERAL RADIOLOGY | Facility: HOSPITAL | Age: 79
End: 2024-07-15
Attending: EMERGENCY MEDICINE
Payer: MEDICARE

## 2024-07-15 VITALS
TEMPERATURE: 97.7 F | BODY MASS INDEX: 30.91 KG/M2 | WEIGHT: 168 LBS | SYSTOLIC BLOOD PRESSURE: 155 MMHG | HEART RATE: 69 BPM | HEIGHT: 62 IN | DIASTOLIC BLOOD PRESSURE: 74 MMHG | RESPIRATION RATE: 16 BRPM | OXYGEN SATURATION: 98 %

## 2024-07-15 DIAGNOSIS — S20.211A CONTUSION OF RIB ON RIGHT SIDE, INITIAL ENCOUNTER: Primary | ICD-10-CM

## 2024-07-15 PROCEDURE — 71101 X-RAY EXAM UNILAT RIBS/CHEST: CPT

## 2024-07-15 PROCEDURE — 99283 EMERGENCY DEPT VISIT LOW MDM: CPT

## 2024-07-15 RX ORDER — ONDANSETRON 4 MG/1
4 TABLET, ORALLY DISINTEGRATING ORAL 3 TIMES DAILY PRN
Qty: 21 TABLET | Refills: 0 | Status: SHIPPED | OUTPATIENT
Start: 2024-07-15

## 2024-07-15 ASSESSMENT — PAIN DESCRIPTION - PAIN TYPE: TYPE: ACUTE PAIN

## 2024-07-15 ASSESSMENT — PAIN DESCRIPTION - LOCATION: LOCATION: RIB CAGE

## 2024-07-15 ASSESSMENT — PAIN SCALES - GENERAL: PAINLEVEL_OUTOF10: 8

## 2024-07-15 ASSESSMENT — PAIN DESCRIPTION - FREQUENCY: FREQUENCY: CONTINUOUS

## 2024-07-15 ASSESSMENT — PAIN DESCRIPTION - ORIENTATION: ORIENTATION: RIGHT

## 2024-07-15 ASSESSMENT — PAIN - FUNCTIONAL ASSESSMENT: PAIN_FUNCTIONAL_ASSESSMENT: 0-10

## 2024-07-15 NOTE — ED NOTES
Dc instructions given to patient at this time, patient to follow up with pcp as needed, patient with no other questions or concerns.

## 2024-07-15 NOTE — ED NOTES
Patient fell last night going down the stairs, she was holding onto the banister and slipped hitting her right ribs on the step, patient denies any loc or other injury.

## 2024-07-15 NOTE — ED PROVIDER NOTES
and Pcn [penicillins]    FAMILYHISTORY       Family History   Problem Relation Age of Onset    Diabetes Mother     Heart Disease Mother         MI    Stroke Mother     Heart Disease Father         MI    Stroke Father     Diabetes Brother     Hypertension Brother     Diabetes Sister     Hypertension Sister     Breast Cancer Sister         SOCIAL HISTORY       Social History     Tobacco Use    Smoking status: Never    Smokeless tobacco: Never   Substance Use Topics    Alcohol use: No    Drug use: No       SCREENINGS        Elsinore Coma Scale  Eye Opening: Spontaneous  Best Verbal Response: Oriented  Best Motor Response: Obeys commands  Elsinore Coma Scale Score: 15                CIWA Assessment  BP: (!) 155/74  Pulse: 69           PHYSICAL EXAM  1 or more Elements     ED Triage Vitals [07/15/24 0928]   BP Temp Temp Source Pulse Respirations SpO2 Height Weight - Scale   (!) 149/71 97.7 °F (36.5 °C) Oral 69 16 96 % 1.575 m (5' 2\") 76.2 kg (168 lb)       Physical Exam    My pulse oximetry interpretation is which is within the normal range    Patient awake and alert.  Ambulates with a steady gait.  No midline CTL or spinal tenderness palpation.  Point tender to right mid thoracic back area.  No bruising, crepitus, swelling.        DIAGNOSTIC RESULTS   LABS:    Labs Reviewed - No data to display    When ordered only abnormal lab results are displayed. All other labs were within normal range or not returned as of this dictation.    EKG:     RADIOLOGY:   Non-plain film images such as CT, Ultrasound and MRI are read by the radiologist. Plain radiographic images are visualized and preliminarily interpreted by the ED Provider with the below findings:        Interpretation per the Radiologist below, if available at the time of this note:    XR RIBS RIGHT INCLUDE CHEST (MIN 3 VIEWS)   Final Result   No acute abnormality.      Electronically signed by Chaim Germain        No results found.    No results found.    PROCEDURES

## 2024-10-15 ENCOUNTER — OFFICE VISIT (OUTPATIENT)
Dept: PRIMARY CARE CLINIC | Age: 79
End: 2024-10-15

## 2024-10-15 VITALS
DIASTOLIC BLOOD PRESSURE: 70 MMHG | SYSTOLIC BLOOD PRESSURE: 132 MMHG | OXYGEN SATURATION: 100 % | BODY MASS INDEX: 31.72 KG/M2 | WEIGHT: 173.4 LBS | HEART RATE: 64 BPM

## 2024-10-15 DIAGNOSIS — E78.00 PURE HYPERCHOLESTEROLEMIA: ICD-10-CM

## 2024-10-15 DIAGNOSIS — M81.0 OSTEOPOROSIS, UNSPECIFIED OSTEOPOROSIS TYPE, UNSPECIFIED PATHOLOGICAL FRACTURE PRESENCE: ICD-10-CM

## 2024-10-15 DIAGNOSIS — F32.A DEPRESSION, UNSPECIFIED DEPRESSION TYPE: ICD-10-CM

## 2024-10-15 DIAGNOSIS — R07.89 ATYPICAL CHEST PAIN: ICD-10-CM

## 2024-10-15 DIAGNOSIS — Z12.31 ENCOUNTER FOR SCREENING MAMMOGRAM FOR MALIGNANT NEOPLASM OF BREAST: ICD-10-CM

## 2024-10-15 DIAGNOSIS — F33.1 MAJOR DEPRESSIVE DISORDER, RECURRENT, MODERATE (HCC): ICD-10-CM

## 2024-10-15 DIAGNOSIS — I10 PRIMARY HYPERTENSION: ICD-10-CM

## 2024-10-15 DIAGNOSIS — E11.9 TYPE 2 DIABETES MELLITUS WITHOUT COMPLICATION, WITHOUT LONG-TERM CURRENT USE OF INSULIN (HCC): ICD-10-CM

## 2024-10-15 DIAGNOSIS — J30.9 ALLERGIC RHINITIS, UNSPECIFIED SEASONALITY, UNSPECIFIED TRIGGER: ICD-10-CM

## 2024-10-15 DIAGNOSIS — E53.9 VITAMIN B DEFICIENCY: ICD-10-CM

## 2024-10-15 DIAGNOSIS — K59.09 CHRONIC CONSTIPATION: ICD-10-CM

## 2024-10-15 DIAGNOSIS — Z23 NEED FOR INFLUENZA VACCINATION: Primary | ICD-10-CM

## 2024-10-15 DIAGNOSIS — I50.9 CHRONIC CONGESTIVE HEART FAILURE, UNSPECIFIED HEART FAILURE TYPE (HCC): ICD-10-CM

## 2024-10-15 DIAGNOSIS — H61.23 CERUMEN DEBRIS ON TYMPANIC MEMBRANE OF BOTH EARS: ICD-10-CM

## 2024-10-15 DIAGNOSIS — K21.9 GASTROESOPHAGEAL REFLUX DISEASE, UNSPECIFIED WHETHER ESOPHAGITIS PRESENT: ICD-10-CM

## 2024-10-15 DIAGNOSIS — I10 ESSENTIAL HYPERTENSION: ICD-10-CM

## 2024-10-15 DIAGNOSIS — E55.9 VITAMIN D DEFICIENCY: ICD-10-CM

## 2024-10-15 DIAGNOSIS — E03.9 ACQUIRED HYPOTHYROIDISM: ICD-10-CM

## 2024-10-15 RX ORDER — ESCITALOPRAM OXALATE 10 MG/1
TABLET ORAL
Qty: 90 TABLET | Refills: 3 | Status: SHIPPED | OUTPATIENT
Start: 2024-10-15

## 2024-10-15 RX ORDER — ATORVASTATIN CALCIUM 20 MG/1
20 TABLET, FILM COATED ORAL DAILY
Qty: 90 TABLET | Refills: 3 | Status: SHIPPED | OUTPATIENT
Start: 2024-10-15

## 2024-10-15 RX ORDER — ESOMEPRAZOLE MAGNESIUM 40 MG/1
CAPSULE, DELAYED RELEASE ORAL
Qty: 90 CAPSULE | Refills: 3 | Status: SHIPPED | OUTPATIENT
Start: 2024-10-15

## 2024-10-15 RX ORDER — ALENDRONATE SODIUM 70 MG/1
TABLET ORAL
Qty: 12 TABLET | Refills: 3 | Status: SHIPPED | OUTPATIENT
Start: 2024-10-15

## 2024-10-15 RX ORDER — CARBOXYMETHYLCELLULOSE SODIUM 5 MG/ML
1 SOLUTION/ DROPS OPHTHALMIC DAILY PRN
Qty: 1 EACH | Refills: 3 | Status: SHIPPED | OUTPATIENT
Start: 2024-10-15

## 2024-10-15 RX ORDER — FLUTICASONE PROPIONATE 50 MCG
SPRAY, SUSPENSION (ML) NASAL
Qty: 16 G | Refills: 3 | Status: SHIPPED | OUTPATIENT
Start: 2024-10-15

## 2024-10-15 RX ORDER — NITROGLYCERIN 0.4 MG/1
0.4 TABLET SUBLINGUAL PRN
Qty: 25 TABLET | Refills: 3 | Status: SHIPPED | OUTPATIENT
Start: 2024-10-15

## 2024-10-15 RX ORDER — SACUBITRIL AND VALSARTAN 97; 103 MG/1; MG/1
1 TABLET, FILM COATED ORAL 2 TIMES DAILY
Qty: 180 TABLET | Refills: 2 | Status: SHIPPED | OUTPATIENT
Start: 2024-10-15

## 2024-10-15 RX ORDER — HYDROXYZINE PAMOATE 25 MG/1
25 CAPSULE ORAL 3 TIMES DAILY PRN
Qty: 180 CAPSULE | Refills: 1 | Status: SHIPPED | OUTPATIENT
Start: 2024-10-15

## 2024-10-15 RX ORDER — ALBUTEROL SULFATE 90 UG/1
2 INHALANT RESPIRATORY (INHALATION) 4 TIMES DAILY PRN
Qty: 18 G | Refills: 0 | Status: SHIPPED | OUTPATIENT
Start: 2024-10-15

## 2024-10-15 RX ORDER — LEVOTHYROXINE SODIUM 50 UG/1
50 TABLET ORAL DAILY
Qty: 90 TABLET | Refills: 2 | Status: SHIPPED | OUTPATIENT
Start: 2024-10-15

## 2024-10-15 RX ORDER — ERGOCALCIFEROL 1.25 MG/1
50000 CAPSULE ORAL WEEKLY
Qty: 12 CAPSULE | Refills: 3 | Status: SHIPPED | OUTPATIENT
Start: 2024-10-15

## 2024-10-15 RX ORDER — MONTELUKAST SODIUM 10 MG/1
TABLET ORAL
Qty: 90 TABLET | Refills: 3 | Status: SHIPPED | OUTPATIENT
Start: 2024-10-15

## 2024-10-15 RX ORDER — FEXOFENADINE HCL 180 MG/1
180 TABLET ORAL DAILY
Qty: 90 TABLET | Refills: 3 | Status: SHIPPED | OUTPATIENT
Start: 2024-10-15

## 2024-10-15 RX ORDER — CARVEDILOL 25 MG/1
25 TABLET ORAL 2 TIMES DAILY
Qty: 180 TABLET | Refills: 2 | Status: SHIPPED | OUTPATIENT
Start: 2024-10-15

## 2024-10-15 RX ORDER — ASPIRIN 81 MG/1
81 TABLET ORAL DAILY
Qty: 90 TABLET | Refills: 3 | Status: SHIPPED | OUTPATIENT
Start: 2024-10-15

## 2024-10-15 NOTE — PROGRESS NOTES
Chief Complaint   Patient presents with    Diabetes    Hypertension    Hyperlipidemia    Sleep Apnea       Have you seen any other physician or provider since your last visit no    Have you had any other diagnostic tests since your last visit? no    Have you changed or stopped any medications since your last visit? no      SUBJECTIVE:    Patient ID: Jackie Byrd is a 79 y.o.female.    Chief Complaint   Patient presents with    Diabetes    Hypertension    Hyperlipidemia    Sleep Apnea         HPI:  Patient is here today for follow-up. She has had diabetes for the past few years.  She has been compliant with the medications and denies any side effects from it. She has not been monitoring fingersticks on a daily basis. She denies any hypoglycemic symptoms. She has been following a diabetic diet and has been active.  Her last eye exam was less than a year ago.  She is using oral meds only.     Patient has had hypertension and hyperlipidemia for few years. She has been compliant with taking medications, without side effects from it. She has been following a low-sodium, is active and never exercises.  Weight is up 4 pounds, compared to last visit. Her blood pressure is slightly elevated at this time.    Patient's medications, allergies, past medical, surgical, social and family histories were reviewed and updated as appropriate in electronic medical record.        Outpatient Medications Marked as Taking for the 10/15/24 encounter (Office Visit) with Dipika Pacheco APRN   Medication Sig Dispense Refill    albuterol sulfate HFA (VENTOLIN HFA) 108 (90 Base) MCG/ACT inhaler Inhale 2 puffs into the lungs 4 times daily as needed for Wheezing 18 g 0    alendronate (FOSAMAX) 70 MG tablet take 1 tablet by mouth every week 12 tablet 3    aspirin 81 MG EC tablet Take 1 tablet by mouth daily 90 tablet 3    atorvastatin (LIPITOR) 20 MG tablet Take 1 tablet by mouth daily 90 tablet 3    carboxymethylcellulose (REFRESH PLUS) 0.5

## 2024-10-15 NOTE — PROGRESS NOTES
Vaccine Information Sheet, \"Influenza - Inactivated\"  given to Jackie Byrd, or parent/legal guardian of  Jackie Byrd and verbalized understanding.    Patient responses:    Have you ever had a reaction to a flu vaccine? No  Do you have any current illness?  No  Have you ever had Guillian Almira Syndrome?  No  Do you have a serious allergy to any of the follow: Neomycin, Polymyxin, Thimerosal, eggs or egg products? No    Flu vaccine given per order. Please see immunization tab.    Risks and benefits explained.  Current VIS given.      Immunizations Administered       Name Date Dose Route    Influenza, FLUAD, (age 65 y+), IM, Trivalent PF, 0.5mL 10/15/2024 0.5 mL Intramuscular    Site: Deltoid- Left    Lot: [id number]    NDC: 17499-470-84

## 2024-10-22 ENCOUNTER — APPOINTMENT (OUTPATIENT)
Dept: GENERAL RADIOLOGY | Facility: HOSPITAL | Age: 79
End: 2024-10-22
Attending: STUDENT IN AN ORGANIZED HEALTH CARE EDUCATION/TRAINING PROGRAM
Payer: MEDICARE

## 2024-10-22 ENCOUNTER — APPOINTMENT (OUTPATIENT)
Dept: GENERAL RADIOLOGY | Facility: HOSPITAL | Age: 79
End: 2024-10-22
Attending: HOSPITALIST
Payer: MEDICARE

## 2024-10-22 ENCOUNTER — APPOINTMENT (OUTPATIENT)
Dept: CT IMAGING | Facility: HOSPITAL | Age: 79
End: 2024-10-22
Attending: HOSPITALIST
Payer: MEDICARE

## 2024-10-22 ENCOUNTER — TELEPHONE (OUTPATIENT)
Dept: PRIMARY CARE CLINIC | Age: 79
End: 2024-10-22

## 2024-10-22 ENCOUNTER — HOSPITAL ENCOUNTER (EMERGENCY)
Facility: HOSPITAL | Age: 79
Discharge: HOME | End: 2024-10-22
Attending: HOSPITALIST
Payer: MEDICARE

## 2024-10-22 VITALS
SYSTOLIC BLOOD PRESSURE: 148 MMHG | DIASTOLIC BLOOD PRESSURE: 71 MMHG | WEIGHT: 172 LBS | HEART RATE: 64 BPM | BODY MASS INDEX: 31.65 KG/M2 | TEMPERATURE: 98 F | RESPIRATION RATE: 18 BRPM | OXYGEN SATURATION: 95 % | HEIGHT: 62 IN

## 2024-10-22 DIAGNOSIS — S09.92XA NASAL INJURY, INITIAL ENCOUNTER: ICD-10-CM

## 2024-10-22 DIAGNOSIS — S82.041A CLOSED DISPLACED COMMINUTED FRACTURE OF RIGHT PATELLA, INITIAL ENCOUNTER: ICD-10-CM
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
## 2024-10-22 DIAGNOSIS — S76.211A STRAIN OF RIGHT GROIN: ICD-10-CM

## 2024-10-22 PROCEDURE — 70450 CT HEAD/BRAIN W/O DYE: CPT

## 2024-10-22 PROCEDURE — 73562 X-RAY EXAM OF KNEE 3: CPT

## 2024-10-22 PROCEDURE — 73502 X-RAY EXAM HIP UNI 2-3 VIEWS: CPT

## 2024-10-22 PROCEDURE — 6370000000 HC RX 637 (ALT 250 FOR IP): Performed by: HOSPITALIST

## 2024-10-22 PROCEDURE — 73660 X-RAY EXAM OF TOE(S): CPT

## 2024-10-22 PROCEDURE — 99284 EMERGENCY DEPT VISIT MOD MDM: CPT

## 2024-10-22 RX ORDER — OXYCODONE HYDROCHLORIDE 5 MG/1
5 TABLET ORAL EVERY 6 HOURS PRN
Qty: 7 TABLET | Refills: 0 | Status: SHIPPED | OUTPATIENT
Start: 2024-10-22 | End: 2024-10-25

## 2024-10-22 RX ORDER — OXYCODONE AND ACETAMINOPHEN 5; 325 MG/1; MG/1
1 TABLET ORAL ONCE
Status: COMPLETED | OUTPATIENT
Start: 2024-10-22 | End: 2024-10-22

## 2024-10-22 RX ADMIN — OXYCODONE HYDROCHLORIDE AND ACETAMINOPHEN 1 TABLET: 5; 325 TABLET ORAL at 19:04

## 2024-10-22 ASSESSMENT — PAIN SCALES - GENERAL
PAINLEVEL_OUTOF10: 10
PAINLEVEL_OUTOF10: 10

## 2024-10-22 ASSESSMENT — PAIN DESCRIPTION - ORIENTATION
ORIENTATION: RIGHT
ORIENTATION: RIGHT

## 2024-10-22 ASSESSMENT — PAIN DESCRIPTION - DESCRIPTORS
DESCRIPTORS: ACHING
DESCRIPTORS: ACHING

## 2024-10-22 ASSESSMENT — PAIN - FUNCTIONAL ASSESSMENT: PAIN_FUNCTIONAL_ASSESSMENT: 0-10

## 2024-10-22 ASSESSMENT — PAIN DESCRIPTION - PAIN TYPE: TYPE: ACUTE PAIN

## 2024-10-22 ASSESSMENT — PAIN DESCRIPTION - LOCATION: LOCATION: KNEE

## 2024-10-22 ASSESSMENT — LIFESTYLE VARIABLES: HOW OFTEN DO YOU HAVE A DRINK CONTAINING ALCOHOL: NEVER

## 2024-10-22 NOTE — ED NOTES
Pt brought to er in wheel chair.  She requests to use restroom prior to going to exam room.  Pt assisted, minimally, to restroom vcia w/c.

## 2024-10-22 NOTE — ED TRIAGE NOTES
Pt fell about 1500 today.  She injured her right great toe, her right knee.  She also c/o right groin pain (no hip pain). She thinks she may have hurt her groin trying to scoot to get up.  She also c/o nose pain.  States she landed on her nose as well.

## 2024-10-23 SDOH — ECONOMIC STABILITY: INCOME INSECURITY: HOW HARD IS IT FOR YOU TO PAY FOR THE VERY BASICS LIKE FOOD, HOUSING, MEDICAL CARE, AND HEATING?: NOT HARD AT ALL

## 2024-10-23 SDOH — ECONOMIC STABILITY: FOOD INSECURITY: WITHIN THE PAST 12 MONTHS, YOU WORRIED THAT YOUR FOOD WOULD RUN OUT BEFORE YOU GOT MONEY TO BUY MORE.: NEVER TRUE

## 2024-10-23 SDOH — ECONOMIC STABILITY: FOOD INSECURITY: WITHIN THE PAST 12 MONTHS, THE FOOD YOU BOUGHT JUST DIDN'T LAST AND YOU DIDN'T HAVE MONEY TO GET MORE.: NEVER TRUE

## 2024-10-23 NOTE — ED PROVIDER NOTES
reviewed. Up-to-date CT equipment and radiation dose reduction techniques were employed. FINDINGS: No acute intra- or extra-axial fluid collections are identified. The ventricles are nondilated. The basilar cisterns are patent. No mass effect or midline shift is seen. The gray-white matter differentiation is normal. Mild periventricular white matter hypoattenuation is indicative of chronic small vessel ischemic disease. Visualized portions of the orbits, paranasal sinuses, and mastoids appear normal. No acute fracture is identified.     1.  No acute intracranial process. Electronically signed by Kirt Crepso    XR TOE RIGHT (MIN 2 VIEWS)    Result Date: 10/22/2024  Exam: Right foot first digit, 2 views History: fall, pain, bruising, pain Comparison: None available Findings: There is no acute fracture or dislocation. No osseus erosion or destruction is seen. There is first MTP osteoarthritis and hallux valgus deformity. No radiopaque foreign body is seen. There is no soft tissue swelling.     No acute osseous injury. Electronically signed by Kirt Crespo    XR HIP RIGHT (2-3 VIEWS)    Result Date: 10/22/2024  Exam: XR HIP RIGHT (2-3 VIEWS) History: fall, pain Comparison: 6/7/2021 Findings: The pelvic ring is intact. There is no acute fracture or dislocation. Sacroiliac joints appear normal. There is bilateral hip osteoarthritis.     No acute osseous injury. Electronically signed by Kirt Crespo    XR KNEE RIGHT (3 VIEWS)    Result Date: 10/22/2024  Exam: Right knee, 3 views History: fall, pain, bruising, abrasion Comparison: None available Findings: There is a mildly comminuted and nondisplaced fracture of the patella. Joint effusion or likely lipohemarthrosis is present. There is prepatellar soft tissue swelling.     Mildly comminuted fracture of the patella with minimal displacement. Lipohemarthrosis. Electronically signed by Kirt Crespo     Final ED Course and MDM:  In brief, Jackie Byrd is a 79 y.o. female whose 
warranted at this time examination is essentially normal except for the small abrasion and bruising in the V shape which matches with the shape of her glasses.  She had no loss of consciousness.  Since patient did not have technically head injury she is not sure if she struck her head or not but she is generally sure she had the facial pain from her nose and she is on aspirin we will go and perform CT scan of the head per her request to make sure there is no acute abnormality.  Otherwise she is resting comfort on stretcher no acute distress nontoxic-appearing.  Patient's final disposition return once her radiological studies been performed reviewed.    19:00p.m.  I have signed out Jackie Byrd's Emergency Department care to Dr. Arce. We discussed the pertinent history, physical exam, completed/pending test results (if applicable) and current treatment plan. Please refer to his/her chart for the patients remaining Emergency Department course and final disposition.        CONSULTS: (Who and What was discussed)  None    Discussion with Other Profesionals : Final disposition be determined oncoming physician after shift change.  Case we discussed with them    Social Determinants : None    Chronic Conditions:  has a past medical history of Anxiety, Arthritis, Asthma, Atherosclerotic heart disease of native coronary artery with unspecified angina pectoris (4/4/2023), Diabetes mellitus ([facility]), GERD (gastroesophageal reflux disease), Hyperlipidemia, Hypertension, Hypothyroidism, Melanoma ([facility]) (2003), Osteoporosis, Sleep apnea, and Thyroid nodule.    Records Reviewed : None    Disposition Considerations (include 1 Tests not done, Shared Decision Making, Pt Expectation of Test or Tx.): Agreeable to have radiograph of the great toe, knee and CT scan of the head  Final disposition return by the oncoming physician after shift change.  Case were discussed with them.      I am the Primary Clinician of Record.    FINAL

## 2024-10-23 NOTE — ED NOTES
Pt rang out for nurse for assistance.  When I opened door, pt was sitting on the floor.  She stated that she eased herself into the sitting position after using the commode.  She denies fall, she denies injury.  Pt's family member was present and states that she helped her to the sitting position.  Pt was assisted back to bed dr. Arce and myself.  House supervisor aware.

## 2024-10-23 NOTE — DISCHARGE INSTRUCTIONS
Return with any new or worsening symptoms. Follow up with orthopedics in the next week. Weight bearing as tolerated. Stay in immobilizer.

## 2024-10-23 NOTE — PROGRESS NOTES
Patient has a history of sleep apnea and has been using her CPAP every night and tolerating it well. She has been getting good results.

## 2024-11-01 ENCOUNTER — HOSPITAL ENCOUNTER (OUTPATIENT)
Dept: MAMMOGRAPHY | Facility: HOSPITAL | Age: 79
Discharge: HOME | End: 2024-11-01
Payer: MEDICARE

## 2024-11-01 VITALS — BODY MASS INDEX: 31.83 KG/M2 | HEIGHT: 62 IN | WEIGHT: 173 LBS

## 2024-11-01 DIAGNOSIS — Z12.31 ENCOUNTER FOR SCREENING MAMMOGRAM FOR MALIGNANT NEOPLASM OF BREAST: ICD-10-CM

## 2024-11-01 PROCEDURE — 77063 BREAST TOMOSYNTHESIS BI: CPT

## 2024-11-06 ENCOUNTER — OFFICE VISIT (OUTPATIENT)
Dept: PRIMARY CARE CLINIC | Age: 79
End: 2024-11-06

## 2024-11-06 ENCOUNTER — HOSPITAL ENCOUNTER (OUTPATIENT)
Facility: HOSPITAL | Age: 79
Discharge: HOME | End: 2024-11-06
Payer: MEDICARE

## 2024-11-06 ENCOUNTER — TELEPHONE (OUTPATIENT)
Dept: PRIMARY CARE CLINIC | Age: 79
End: 2024-11-06

## 2024-11-06 VITALS
BODY MASS INDEX: 31.36 KG/M2 | OXYGEN SATURATION: 95 % | HEIGHT: 62 IN | HEART RATE: 73 BPM | SYSTOLIC BLOOD PRESSURE: 125 MMHG | TEMPERATURE: 97.5 F | DIASTOLIC BLOOD PRESSURE: 72 MMHG | WEIGHT: 170.4 LBS

## 2024-11-06 DIAGNOSIS — E03.9 ACQUIRED HYPOTHYROIDISM: ICD-10-CM

## 2024-11-06 DIAGNOSIS — E78.00 PURE HYPERCHOLESTEROLEMIA: ICD-10-CM

## 2024-11-06 DIAGNOSIS — Z00.00 MEDICARE ANNUAL WELLNESS VISIT, SUBSEQUENT: Primary | ICD-10-CM

## 2024-11-06 DIAGNOSIS — S82.044A CLOSED NONDISPLACED COMMINUTED FRACTURE OF RIGHT PATELLA, INITIAL ENCOUNTER: ICD-10-CM

## 2024-11-06 DIAGNOSIS — E11.9 TYPE 2 DIABETES MELLITUS WITHOUT COMPLICATION, WITHOUT LONG-TERM CURRENT USE OF INSULIN (HCC): ICD-10-CM

## 2024-11-06 DIAGNOSIS — E55.9 VITAMIN D DEFICIENCY: ICD-10-CM

## 2024-11-06 DIAGNOSIS — E53.9 VITAMIN B DEFICIENCY: ICD-10-CM

## 2024-11-06 LAB
25(OH)D3 SERPL-MCNC: 44.5 NG/ML (ref 32–100)
ALBUMIN SERPL-MCNC: 4.1 G/DL (ref 3.4–4.8)
ALBUMIN/GLOB SERPL: 2 {RATIO} (ref 0.8–2)
ALP SERPL-CCNC: 169 U/L (ref 25–100)
ALT SERPL-CCNC: 18 U/L (ref 4–36)
ANION GAP SERPL CALCULATED.3IONS-SCNC: 11 MMOL/L (ref 3–16)
AST SERPL-CCNC: 18 U/L (ref 8–33)
BILIRUB SERPL-MCNC: 0.6 MG/DL (ref 0.3–1.2)
BUN SERPL-MCNC: 20 MG/DL (ref 6–20)
CALCIUM SERPL-MCNC: 9.4 MG/DL (ref 8.5–10.5)
CHLORIDE SERPL-SCNC: 100 MMOL/L (ref 98–107)
CHOLEST SERPL-MCNC: 138 MG/DL (ref 0–200)
CO2 SERPL-SCNC: 25 MMOL/L (ref 20–30)
CREAT SERPL-MCNC: 1.1 MG/DL (ref 0.4–1.2)
ERYTHROCYTE [DISTWIDTH] IN BLOOD BY AUTOMATED COUNT: 12.9 % (ref 11–16)
FOLATE SERPL-MCNC: 11.02 NG/ML
GFR SERPLBLD CREATININE-BSD FMLA CKD-EPI: 52 ML/MIN/{1.73_M2}
GLOBULIN SER CALC-MCNC: 2.1 G/DL
GLUCOSE SERPL-MCNC: 123 MG/DL (ref 74–106)
HBA1C MFR BLD: 6.9 %
HCT VFR BLD AUTO: 36 % (ref 37–47)
HDLC SERPL-MCNC: 46 MG/DL (ref 40–60)
HGB BLD-MCNC: 11.6 G/DL (ref 11.5–16.5)
LDLC SERPL CALC-MCNC: 64 MG/DL
MCH RBC QN AUTO: 28.9 PG (ref 27–32)
MCHC RBC AUTO-ENTMCNC: 32.2 G/DL (ref 31–35)
MCV RBC AUTO: 89.8 FL (ref 80–100)
PLATELET # BLD AUTO: 261 K/UL (ref 150–400)
PMV BLD AUTO: 10.7 FL (ref 6–10)
POTASSIUM SERPL-SCNC: 4.6 MMOL/L (ref 3.4–5.1)
PROT SERPL-MCNC: 6.2 G/DL (ref 6.4–8.3)
RBC # BLD AUTO: 4.01 M/UL (ref 3.8–5.8)
SODIUM SERPL-SCNC: 136 MMOL/L (ref 136–145)
TRIGL SERPL-MCNC: 138 MG/DL (ref 0–249)
TSH SERPL DL<=0.005 MIU/L-ACNC: 3.18 UIU/ML (ref 0.27–4.2)
VIT B12 SERPL-MCNC: 2000 PG/ML (ref 211–911)
VLDLC SERPL CALC-MCNC: 28 MG/DL
WBC # BLD AUTO: 4.9 K/UL (ref 4–11)

## 2024-11-06 PROCEDURE — 85027 COMPLETE CBC AUTOMATED: CPT

## 2024-11-06 PROCEDURE — 82746 ASSAY OF FOLIC ACID SERUM: CPT

## 2024-11-06 PROCEDURE — 80061 LIPID PANEL: CPT

## 2024-11-06 PROCEDURE — 80053 COMPREHEN METABOLIC PANEL: CPT

## 2024-11-06 PROCEDURE — 82306 VITAMIN D 25 HYDROXY: CPT

## 2024-11-06 PROCEDURE — 84443 ASSAY THYROID STIM HORMONE: CPT

## 2024-11-06 PROCEDURE — 36415 COLL VENOUS BLD VENIPUNCTURE: CPT

## 2024-11-06 PROCEDURE — 82607 VITAMIN B-12: CPT

## 2024-11-06 PROCEDURE — 83036 HEMOGLOBIN GLYCOSYLATED A1C: CPT

## 2024-11-06 RX ORDER — OXYCODONE AND ACETAMINOPHEN 5; 325 MG/1; MG/1
TABLET ORAL
COMMUNITY
Start: 2024-10-28

## 2024-11-06 ASSESSMENT — PATIENT HEALTH QUESTIONNAIRE - PHQ9
4. FEELING TIRED OR HAVING LITTLE ENERGY: NOT AT ALL
SUM OF ALL RESPONSES TO PHQ QUESTIONS 1-9: 4
9. THOUGHTS THAT YOU WOULD BE BETTER OFF DEAD, OR OF HURTING YOURSELF: NOT AT ALL
3. TROUBLE FALLING OR STAYING ASLEEP: NOT AT ALL
2. FEELING DOWN, DEPRESSED OR HOPELESS: NOT AT ALL
5. POOR APPETITE OR OVEREATING: SEVERAL DAYS
SUM OF ALL RESPONSES TO PHQ QUESTIONS 1-9: 4
1. LITTLE INTEREST OR PLEASURE IN DOING THINGS: MORE THAN HALF THE DAYS
SUM OF ALL RESPONSES TO PHQ QUESTIONS 1-9: 4
7. TROUBLE CONCENTRATING ON THINGS, SUCH AS READING THE NEWSPAPER OR WATCHING TELEVISION: NOT AT ALL
6. FEELING BAD ABOUT YOURSELF - OR THAT YOU ARE A FAILURE OR HAVE LET YOURSELF OR YOUR FAMILY DOWN: SEVERAL DAYS
SUM OF ALL RESPONSES TO PHQ9 QUESTIONS 1 & 2: 2
SUM OF ALL RESPONSES TO PHQ QUESTIONS 1-9: 4
10. IF YOU CHECKED OFF ANY PROBLEMS, HOW DIFFICULT HAVE THESE PROBLEMS MADE IT FOR YOU TO DO YOUR WORK, TAKE CARE OF THINGS AT HOME, OR GET ALONG WITH OTHER PEOPLE: NOT DIFFICULT AT ALL
8. MOVING OR SPEAKING SO SLOWLY THAT OTHER PEOPLE COULD HAVE NOTICED. OR THE OPPOSITE, BEING SO FIGETY OR RESTLESS THAT YOU HAVE BEEN MOVING AROUND A LOT MORE THAN USUAL: NOT AT ALL

## 2024-11-06 ASSESSMENT — LIFESTYLE VARIABLES
HOW MANY STANDARD DRINKS CONTAINING ALCOHOL DO YOU HAVE ON A TYPICAL DAY: PATIENT DOES NOT DRINK
HOW OFTEN DO YOU HAVE A DRINK CONTAINING ALCOHOL: NEVER

## 2024-11-06 NOTE — TELEPHONE ENCOUNTER
I called Dr Dodson's office ,they had spoken with Jackie and gave her the number for [address] [facility] [phone], on the 25th of October., I then called [facility], they informed me of her visit 13th @ 11:00, she is to wear the Brace when she is up and moving, not continuously, only when she is up moving, I nformed the patient as well as left message for daughter, per Jackie's request....

## 2024-12-03 DIAGNOSIS — R41.3 MEMORY LOSS: ICD-10-CM

## 2024-12-03 RX ORDER — DONEPEZIL HYDROCHLORIDE 10 MG/1
TABLET, FILM COATED ORAL
Qty: 90 TABLET | Refills: 1 | Status: SHIPPED | OUTPATIENT
Start: 2024-12-03

## 2025-01-31 NOTE — PATIENT INSTRUCTIONS
Pharmacy requesting medication refill. Please approve or deny this request.    Rx requested:  Requested Prescriptions     Pending Prescriptions Disp Refills    valsartan (DIOVAN) 80 MG tablet [Pharmacy Med Name: VALSARTAN 80 MG TABLET] 90 tablet 1     Sig: TAKE 1 TABLET BY MOUTH EVERY DAY         Last Office Visit:   8/20/2024      Next Visit Date:  Future Appointments   Date Time Provider Department Center   2/18/2025  3:30 PM Saad Marques DO [facility]      The medication list included in this document is our record of what you are currently taking, including any changes that were made at today's visit.  If you find any differences when compared to your medications at home, or have any questions that were not answered at your visit, please contact the office.Keep a list of your medicines with you. List all of the prescription medicines, nonprescription medicines, supplements, natural remedies, and vitamins that you take. Tell your healthcare providers who treat you about all of the products you are taking. Your provider can provide you with a form to keep track of them. Just ask.  Follow the directions that come with your medicine, including information about food or alcohol. Make sure you know how and when to take your medicine. Do not take more or less than you are supposed to take.  Keep all medicines out of the reach of children.  Store medicines according to the directions on the label.  Monitor yourself. Learn to know how your body reacts to your new medicine and keep track of how it makes you feel before attempting (If your provider has allowed you to do so) to drive or go to work.   Seek emergency medical attention if you think you have used too much of this medicine. An overdose of any prescription medicine can be fatal. Overdose symptoms may include extreme drowsiness, muscle weakness, confusion, cold and clammy skin, pinpoint pupils, shallow breathing, slow heart rate, fainting, or coma.  Don't share prescription medicines with others, even when they seem to have the same symptoms. What may be good for you may be harmful to others.  If you are no longer taking a prescribed medication and you have pills left please take your pills out of their original containers. Mix crushed pills with an undesirable substance, such as cat litter or used coffee grounds. Put the mixture into a disposable container with a lid, such as an empty margarine tub, or into a sealable

## 2025-02-24 ENCOUNTER — OFFICE VISIT (OUTPATIENT)
Age: 80
End: 2025-02-24
Payer: MEDICARE

## 2025-02-24 VITALS
OXYGEN SATURATION: 96 % | HEART RATE: 64 BPM | TEMPERATURE: 97.7 F | SYSTOLIC BLOOD PRESSURE: 134 MMHG | RESPIRATION RATE: 16 BRPM | HEIGHT: 62 IN | WEIGHT: 174.8 LBS | DIASTOLIC BLOOD PRESSURE: 78 MMHG | BODY MASS INDEX: 32.17 KG/M2

## 2025-02-24 DIAGNOSIS — F32.A DEPRESSION, UNSPECIFIED DEPRESSION TYPE: ICD-10-CM

## 2025-02-24 DIAGNOSIS — E03.9 ACQUIRED HYPOTHYROIDISM: ICD-10-CM

## 2025-02-24 DIAGNOSIS — E11.22 TYPE 2 DIABETES MELLITUS WITH STAGE 3A CHRONIC KIDNEY DISEASE, WITHOUT LONG-TERM CURRENT USE OF INSULIN (HCC): ICD-10-CM

## 2025-02-24 DIAGNOSIS — F33.1 MAJOR DEPRESSIVE DISORDER, RECURRENT, MODERATE (HCC): ICD-10-CM

## 2025-02-24 DIAGNOSIS — I50.9 CHRONIC CONGESTIVE HEART FAILURE, UNSPECIFIED HEART FAILURE TYPE (HCC): ICD-10-CM

## 2025-02-24 DIAGNOSIS — E11.9 TYPE 2 DIABETES MELLITUS WITHOUT COMPLICATION, WITHOUT LONG-TERM CURRENT USE OF INSULIN (HCC): ICD-10-CM

## 2025-02-24 DIAGNOSIS — I10 ESSENTIAL HYPERTENSION: Primary | ICD-10-CM

## 2025-02-24 DIAGNOSIS — N18.31 TYPE 2 DIABETES MELLITUS WITH STAGE 3A CHRONIC KIDNEY DISEASE, WITHOUT LONG-TERM CURRENT USE OF INSULIN (HCC): ICD-10-CM

## 2025-02-24 DIAGNOSIS — E55.9 VITAMIN D DEFICIENCY: ICD-10-CM

## 2025-02-24 DIAGNOSIS — I10 PRIMARY HYPERTENSION: ICD-10-CM

## 2025-02-24 PROCEDURE — G8417 CALC BMI ABV UP PARAM F/U: HCPCS | Performed by: NURSE PRACTITIONER

## 2025-02-24 PROCEDURE — G8427 DOCREV CUR MEDS BY ELIG CLIN: HCPCS | Performed by: NURSE PRACTITIONER

## 2025-02-24 PROCEDURE — 1090F PRES/ABSN URINE INCON ASSESS: CPT | Performed by: NURSE PRACTITIONER

## 2025-02-24 PROCEDURE — 99214 OFFICE O/P EST MOD 30 MIN: CPT | Performed by: NURSE PRACTITIONER

## 2025-02-24 PROCEDURE — 1036F TOBACCO NON-USER: CPT | Performed by: NURSE PRACTITIONER

## 2025-02-24 PROCEDURE — 3075F SYST BP GE 130 - 139MM HG: CPT | Performed by: NURSE PRACTITIONER

## 2025-02-24 PROCEDURE — 3078F DIAST BP <80 MM HG: CPT | Performed by: NURSE PRACTITIONER

## 2025-02-24 PROCEDURE — 1159F MED LIST DOCD IN RCRD: CPT | Performed by: NURSE PRACTITIONER

## 2025-02-24 PROCEDURE — G8399 PT W/DXA RESULTS DOCUMENT: HCPCS | Performed by: NURSE PRACTITIONER

## 2025-02-24 PROCEDURE — 1123F ACP DISCUSS/DSCN MKR DOCD: CPT | Performed by: NURSE PRACTITIONER

## 2025-02-24 RX ORDER — CARVEDILOL 25 MG/1
25 TABLET ORAL 2 TIMES DAILY
Qty: 180 TABLET | Refills: 2 | Status: SHIPPED | OUTPATIENT
Start: 2025-02-24

## 2025-02-24 RX ORDER — SACUBITRIL AND VALSARTAN 97; 103 MG/1; MG/1
1 TABLET, FILM COATED ORAL 2 TIMES DAILY
Qty: 180 TABLET | Refills: 2 | Status: SHIPPED | OUTPATIENT
Start: 2025-02-24

## 2025-02-24 RX ORDER — ESCITALOPRAM OXALATE 10 MG/1
TABLET ORAL
Qty: 90 TABLET | Refills: 3 | Status: SHIPPED | OUTPATIENT
Start: 2025-02-24

## 2025-02-24 RX ORDER — LEVOTHYROXINE SODIUM 50 UG/1
50 TABLET ORAL DAILY
Qty: 90 TABLET | Refills: 2 | Status: SHIPPED | OUTPATIENT
Start: 2025-02-24

## 2025-02-24 SDOH — ECONOMIC STABILITY: FOOD INSECURITY: WITHIN THE PAST 12 MONTHS, THE FOOD YOU BOUGHT JUST DIDN'T LAST AND YOU DIDN'T HAVE MONEY TO GET MORE.: NEVER TRUE

## 2025-02-24 SDOH — ECONOMIC STABILITY: FOOD INSECURITY: WITHIN THE PAST 12 MONTHS, YOU WORRIED THAT YOUR FOOD WOULD RUN OUT BEFORE YOU GOT MONEY TO BUY MORE.: NEVER TRUE

## 2025-02-24 ASSESSMENT — PATIENT HEALTH QUESTIONNAIRE - PHQ9
6. FEELING BAD ABOUT YOURSELF - OR THAT YOU ARE A FAILURE OR HAVE LET YOURSELF OR YOUR FAMILY DOWN: NOT AT ALL
SUM OF ALL RESPONSES TO PHQ9 QUESTIONS 1 & 2: 1
SUM OF ALL RESPONSES TO PHQ QUESTIONS 1-9: 4
5. POOR APPETITE OR OVEREATING: SEVERAL DAYS
SUM OF ALL RESPONSES TO PHQ QUESTIONS 1-9: 4
4. FEELING TIRED OR HAVING LITTLE ENERGY: NOT AT ALL
SUM OF ALL RESPONSES TO PHQ QUESTIONS 1-9: 4
SUM OF ALL RESPONSES TO PHQ QUESTIONS 1-9: 4
1. LITTLE INTEREST OR PLEASURE IN DOING THINGS: NOT AT ALL
7. TROUBLE CONCENTRATING ON THINGS, SUCH AS READING THE NEWSPAPER OR WATCHING TELEVISION: SEVERAL DAYS
3. TROUBLE FALLING OR STAYING ASLEEP: NOT AT ALL
8. MOVING OR SPEAKING SO SLOWLY THAT OTHER PEOPLE COULD HAVE NOTICED. OR THE OPPOSITE, BEING SO FIGETY OR RESTLESS THAT YOU HAVE BEEN MOVING AROUND A LOT MORE THAN USUAL: SEVERAL DAYS
9. THOUGHTS THAT YOU WOULD BE BETTER OFF DEAD, OR OF HURTING YOURSELF: NOT AT ALL
2. FEELING DOWN, DEPRESSED OR HOPELESS: SEVERAL DAYS
10. IF YOU CHECKED OFF ANY PROBLEMS, HOW DIFFICULT HAVE THESE PROBLEMS MADE IT FOR YOU TO DO YOUR WORK, TAKE CARE OF THINGS AT HOME, OR GET ALONG WITH OTHER PEOPLE: NOT DIFFICULT AT ALL

## 2025-02-24 NOTE — PROGRESS NOTES
Chief Complaint   Patient presents with    Hypertension    Diabetes    Hypothyroidism    Hyperlipidemia    Depression       Have you seen any other physician or provider since your last visit no    Have you had any other diagnostic tests since your last visit? yes - labs last visit     Have you changed or stopped any medications since your last visit? no        SUBJECTIVE:    Patient ID:Jackie Byrd is a 80 y.o. female.    Chief Complaint   Patient presents with    Hypertension    Diabetes    Hypothyroidism    Hyperlipidemia    Depression     HPI:  Patient is here for her 4  month follow up. She has not been checking her sugar.   Patient c/o bilateral ear problem. She states thaey feel like there clogged and then they pop.   History of Present Illness  The patient presents for evaluation of right knee pain, diabetes mellitus, depression, memory issues, and chronic heart failure with preserved ejection fraction.    She continues to experience discomfort in her right knee, necessitating ongoing physical therapy sessions twice weekly. Despite the pain, she reports a gradual improvement in her condition. She has been under the care of an orthopedist, with a recent consultation last week and a follow-up appointment scheduled for tomorrow. She maintains her ability to drive.    She has not been monitoring her blood glucose levels. Her current medication regimen includes metformin 500 mg, administered twice daily.    She is on Lexapro 10 mg for depression, which seems to be going well.    She believes Aricept has helped her memory.    She has a scheduled appointment with her cardiologist, Dr. Brenner, next week. She reports no peripheral edema.    Supplemental Information  She reports a sensation of clogged ears, which occasionally pop.    MEDICATIONS  Metformin, Synthroid, Aricept, Lexapro, Coreg, Entresto.      Patient's medications, allergies, past medical, surgical, social and family histories were reviewed and

## 2025-02-27 ENCOUNTER — OFFICE VISIT (OUTPATIENT)
Dept: CARDIOLOGY | Facility: CLINIC | Age: 80
End: 2025-02-27
Payer: MEDICARE

## 2025-02-27 VITALS
HEIGHT: 62 IN | DIASTOLIC BLOOD PRESSURE: 58 MMHG | HEART RATE: 64 BPM | WEIGHT: 176.8 LBS | SYSTOLIC BLOOD PRESSURE: 112 MMHG | BODY MASS INDEX: 32.54 KG/M2 | OXYGEN SATURATION: 95 %

## 2025-02-27 DIAGNOSIS — E78.2 MIXED HYPERLIPIDEMIA: ICD-10-CM

## 2025-02-27 DIAGNOSIS — I50.32 CHRONIC HEART FAILURE WITH PRESERVED EJECTION FRACTION (HFPEF): Primary | ICD-10-CM

## 2025-02-27 DIAGNOSIS — I10 ESSENTIAL HYPERTENSION: ICD-10-CM

## 2025-02-27 RX ORDER — GINSENG 100 MG
1 CAPSULE ORAL DAILY
COMMUNITY

## 2025-02-27 NOTE — PROGRESS NOTES
[facility]  Office visit  Swati Gonzalez  1945  [phone]  There is no work phone number on file.    VISIT DATE:  2/27/2025    PCP: Honey Lopez APRN [address] [place] [address]    CC:  Chief Complaint   Patient presents with    Essential hypertension    Shortness of Breath    Dizziness       Previous cardiac studies and procedures:  July 2016 Gale scan myocardial perfusion imaging  1.  Normal left ventricular systolic function and wall motion.   2.  No evidence of inducible ischemia by scintigraphic criteria.     August 2022  TTE   Ejection fraction is visually estimated to be 60-65 %.    Diastolic filling parameters suggests grade II diastolic dysfunction .    The left atrium is mildly dilated.     Myocardial perfusion imaging   There is normal isotope uptake at stress and rest. There is no evidence of    myocardial ischemia or scar.    Left ventricular ejection fraction of 82 %.     ASSESSMENT:   Diagnosis Plan   1. Chronic heart failure with preserved ejection fraction (HFpEF)        2. Essential hypertension        3. Mixed hyperlipidemia              PLAN:  Heart failure with preserved ejection fraction, chronic: Currently euvolemic and compensated.  Afterload well controlled.  Continue current medical therapy.    Hypertension: Goal less than 130/80 mmHg.  Continue current medical therapy.      Hyperlipidemia: Goal LDL less than 100.  Continue atorvastatin 20 mg p.o. daily    Subjective  Interval assessment: No recent chest discomfort, palpitations or dyspnea.  No significant lower extreme edema.   Compliant with medical therapy.      Initial evaluation: 77-year-old diabetic female with history of hypertension dyslipidemia with intermittent chest discomfort over the previous 1 to 2 weeks.  Describes dull left-sided precordial chest discomfort with radiation to her left shoulder and upper arm.  Waxes and wanes.  No particular known triggers.  Not  "exertional.  No alleviating or exacerbating factors.  Blood pressures running less than 130/80 mmHg.  Has been wearing transdermal nitroglycerin patch for the past week.  Compliant with medical therapy.  Non-smoker.    PHYSICAL EXAMINATION:  Vitals:    02/27/25 1104   BP: 112/58   BP Location: Left arm   Patient Position: Sitting   Pulse: 64   SpO2: 95%   Weight: 80.2 kg (176 lb 12.8 oz)   Height: 157.5 cm (62\")       General Appearance:    Alert, cooperative, no distress, appears stated age   Head:    Normocephalic, without obvious abnormality, atraumatic   Eyes:    conjunctiva/corneas clear   Nose:   Nares normal, septum midline, mucosa normal, no drainage   Throat:   Lips, teeth and gums normal   Neck:   Supple, symmetrical, trachea midline, no carotid    bruit or JVD   Lungs:     Clear to auscultation bilaterally, respirations unlabored   Chest Wall:    No tenderness or deformity    Heart:    Regular rate and rhythm, S1 and S2 normal, no murmur, rub   or gallop, normal carotid impulse bilaterally without bruit.   Abdomen:     Soft, non-tender   Extremities:   Extremities normal, atraumatic, no cyanosis or edema   Pulses:   2+ and symmetric all extremities   Skin:   Skin color, texture, turgor normal, no rashes or lesions       Diagnostic Data:  Procedures  Lab Results   Component Value Date    CHLPL 148 04/11/2024    TRIG 139 04/11/2024    HDL 53 04/11/2024     Lab Results   Component Value Date    GLUCOSE 140 (H) 07/11/2022    BUN 22 07/11/2022    CREATININE 1.07 (H) 07/11/2022     07/11/2022    K 4.5 07/11/2022     07/11/2022    CO2 25.0 07/11/2022     Lab Results   Component Value Date    HGBA1C 6.9 (H) 11/06/2024     Lab Results   Component Value Date    WBC 4.9 11/06/2024    HGB 11.6 11/06/2024    HCT 36.0 (L) 11/06/2024     11/06/2024       Allergies  Allergies   Allergen Reactions    Codeine Nausea And Vomiting    Erythromycin Nausea And Vomiting    Latex Rash    Penicillins Rash "       Current Medications    Current Outpatient Medications:     alendronate (FOSAMAX) 70 MG tablet, Take 1 tablet by mouth Every 7 (Seven) Days., Disp: , Rfl:     aspirin 81 MG EC tablet, Take 1 tablet by mouth Daily., Disp: , Rfl:     atorvastatin (LIPITOR) 20 MG tablet, Take 1 tablet by mouth Daily., Disp: , Rfl:     calcium carbonate (OS-GRICEL) 600 MG tablet, Take 1 tablet by mouth Daily., Disp: , Rfl:     carboxymethylcellulose (REFRESH PLUS) 0.5 % solution, Administer 1 drop to both eyes Daily As Needed for Dry Eyes., Disp: , Rfl:     carvedilol (COREG) 25 MG tablet, Take 1 tablet by mouth 2 (Two) Times a Day., Disp: 60 tablet, Rfl: 3    donepezil (ARICEPT) 5 MG tablet, Take 2 tablets by mouth Every Night., Disp: , Rfl:     ergocalciferol (ERGOCALCIFEROL) 1.25 MG (17748 UT) capsule, Take 1 capsule by mouth 1 (One) Time Per Week., Disp: , Rfl:     escitalopram (LEXAPRO) 10 MG tablet, Take 1 tablet by mouth Daily., Disp: , Rfl:     esomeprazole (nexIUM) 40 MG capsule, 1 po daily in the am 30 minutes before breakfast, Disp: 30 capsule, Rfl: 3    ferrous sulfate 325 (65 FE) MG tablet, Take 1 tablet by mouth Daily With Breakfast., Disp: , Rfl:     fexofenadine (ALLEGRA) 180 MG tablet, Take 1 tablet by mouth Daily., Disp: , Rfl:     fluticasone (FLONASE) 50 MCG/ACT nasal spray, Administer 2 sprays into the nostril(s) as directed by provider Daily., Disp: , Rfl:     hydrOXYzine pamoate (VISTARIL) 25 MG capsule, Take 1 capsule by mouth 3 (Three) Times a Day As Needed., Disp: , Rfl:     levothyroxine (SYNTHROID, LEVOTHROID) 25 MCG tablet, Take 1 tablet by mouth Daily., Disp: , Rfl:     metFORMIN ER (GLUCOPHAGE-XR) 500 MG 24 hr tablet, Take 1 tablet by mouth 3 (Three) Times a Day Before Meals., Disp: , Rfl:     montelukast (SINGULAIR) 10 MG tablet, Take 1 tablet by mouth Every Night., Disp: , Rfl:     nitroglycerin (NITROSTAT) 0.4 MG SL tablet, Place 1 tablet under the tongue Every 5 (Five) Minutes As Needed for Chest  Pain. Take no more than 3 doses in 15 minutes., Disp: , Rfl:     sacubitril-valsartan (Entresto)  MG tablet, TAKE 1 TABLET BY MOUTH TWICE A DAY, Disp: 180 tablet, Rfl: 1    Zinc 50 MG tablet, Take 1 tablet by mouth Daily., Disp: , Rfl:     Biotin 5000 MCG capsule, Take 1 capsule by mouth Daily. (Patient not taking: Reported on 2/27/2025), Disp: , Rfl:     nystatin-triamcinolone (MYCOLOG II) 436673-9.1 UNIT/GM-% cream, Apply 1 Application topically to the appropriate area as directed 2 (Two) Times a Day. (Patient not taking: Reported on 2/27/2025), Disp: , Rfl:     sucralfate (CARAFATE) 1 g tablet, Take 1 tablet by mouth 4 (Four) Times a Day. (Patient not taking: Reported on 2/27/2025), Disp: , Rfl:           ROS  ROS      SOCIAL HX  Social History     Socioeconomic History    Marital status:    Tobacco Use    Smoking status: Never    Smokeless tobacco: Never   Vaping Use    Vaping status: Never Used   Substance and Sexual Activity    Alcohol use: Never    Drug use: Never    Sexual activity: Defer       FAMILY HX  Family History   Problem Relation Age of Onset    Colon polyps Mother     Stroke Mother     Stroke Father     Heart attack Father     Breast cancer Sister     Breast cancer Sister     Prostate cancer Brother     Lung cancer Brother     Colon cancer Neg Hx              Molina Hardin III, MD, FACC

## 2025-04-16 RX ORDER — HYDROXYZINE PAMOATE 25 MG/1
CAPSULE ORAL
Qty: 180 CAPSULE | Refills: 1 | Status: SHIPPED | OUTPATIENT
Start: 2025-04-16

## 2025-06-03 ENCOUNTER — HOSPITAL ENCOUNTER (OUTPATIENT)
Facility: HOSPITAL | Age: 80
Discharge: HOME | End: 2025-06-03
Payer: MEDICARE

## 2025-06-03 DIAGNOSIS — E11.9 TYPE 2 DIABETES MELLITUS WITHOUT COMPLICATION, WITHOUT LONG-TERM CURRENT USE OF INSULIN (HCC): ICD-10-CM

## 2025-06-03 DIAGNOSIS — E55.9 VITAMIN D DEFICIENCY: ICD-10-CM

## 2025-06-03 DIAGNOSIS — I10 PRIMARY HYPERTENSION: ICD-10-CM

## 2025-06-03 DIAGNOSIS — E03.9 ACQUIRED HYPOTHYROIDISM: ICD-10-CM

## 2025-06-03 LAB
25(OH)D3 SERPL-MCNC: 51.4 NG/ML (ref 32–100)
ALBUMIN SERPL-MCNC: 4 G/DL (ref 3.4–4.8)
ALBUMIN/GLOB SERPL: 1.5 {RATIO} (ref 0.8–2)
ALP SERPL-CCNC: 174 U/L (ref 25–100)
ALT SERPL-CCNC: 30 U/L (ref 4–36)
ANION GAP SERPL CALCULATED.3IONS-SCNC: 13 MMOL/L (ref 3–16)
AST SERPL-CCNC: 25 U/L (ref 8–33)
BILIRUB SERPL-MCNC: 0.5 MG/DL (ref 0.3–1.2)
BUN SERPL-MCNC: 17 MG/DL (ref 6–20)
CALCIUM SERPL-MCNC: 9.3 MG/DL (ref 8.3–10.6)
CHLORIDE SERPL-SCNC: 104 MMOL/L (ref 98–107)
CHOLEST SERPL-MCNC: 150 MG/DL (ref 0–200)
CO2 SERPL-SCNC: 24 MMOL/L (ref 20–30)
CREAT SERPL-MCNC: 1.3 MG/DL (ref 0.6–1.2)
ERYTHROCYTE [DISTWIDTH] IN BLOOD BY AUTOMATED COUNT: 12.8 % (ref 11–16)
FOLATE SERPL-MCNC: 10.8 NG/ML
GFR SERPLBLD CREATININE-BSD FMLA CKD-EPI: 42 ML/MIN/{1.73_M2}
GLOBULIN SER CALC-MCNC: 2.7 G/DL
GLUCOSE SERPL-MCNC: 177 MG/DL (ref 74–106)
HBA1C MFR BLD: 8.3 %
HCT VFR BLD AUTO: 39.8 % (ref 37–47)
HDLC SERPL-MCNC: 43 MG/DL (ref 40–60)
HGB BLD-MCNC: 12.4 G/DL (ref 11.5–16.5)
LDLC SERPL CALC-MCNC: 75 MG/DL
MCH RBC QN AUTO: 28.6 PG (ref 27–32)
MCHC RBC AUTO-ENTMCNC: 31.2 G/DL (ref 31–35)
MCV RBC AUTO: 91.9 FL (ref 80–100)
PLATELET # BLD AUTO: 238 K/UL (ref 150–400)
PMV BLD AUTO: 11.3 FL (ref 6–10)
POTASSIUM SERPL-SCNC: 4.2 MMOL/L (ref 3.4–5.1)
PROT SERPL-MCNC: 6.7 G/DL (ref 6.4–8.3)
RBC # BLD AUTO: 4.33 M/UL (ref 3.8–5.8)
SODIUM SERPL-SCNC: 141 MMOL/L (ref 136–145)
TRIGL SERPL-MCNC: 162 MG/DL (ref 0–249)
TSH SERPL DL<=0.005 MIU/L-ACNC: 3.73 UIU/ML (ref 0.27–4.2)
VIT B12 SERPL-MCNC: 2000 PG/ML (ref 211–911)
VLDLC SERPL CALC-MCNC: 32 MG/DL
WBC # BLD AUTO: 8 K/UL (ref 4–11)

## 2025-06-03 PROCEDURE — 82746 ASSAY OF FOLIC ACID SERUM: CPT

## 2025-06-03 PROCEDURE — 82306 VITAMIN D 25 HYDROXY: CPT

## 2025-06-03 PROCEDURE — 84443 ASSAY THYROID STIM HORMONE: CPT

## 2025-06-03 PROCEDURE — 85027 COMPLETE CBC AUTOMATED: CPT

## 2025-06-03 PROCEDURE — 80061 LIPID PANEL: CPT

## 2025-06-03 PROCEDURE — 82607 VITAMIN B-12: CPT

## 2025-06-03 PROCEDURE — 80053 COMPREHEN METABOLIC PANEL: CPT

## 2025-06-03 PROCEDURE — 83036 HEMOGLOBIN GLYCOSYLATED A1C: CPT

## 2025-06-04 DIAGNOSIS — R41.3 MEMORY LOSS: ICD-10-CM

## 2025-06-04 RX ORDER — DONEPEZIL HYDROCHLORIDE 10 MG/1
TABLET, FILM COATED ORAL
Qty: 90 TABLET | Refills: 1 | Status: SHIPPED | OUTPATIENT
Start: 2025-06-04

## 2025-06-06 ENCOUNTER — RESULTS FOLLOW-UP (OUTPATIENT)
Age: 80
End: 2025-06-06

## 2025-06-24 ENCOUNTER — OFFICE VISIT (OUTPATIENT)
Age: 80
End: 2025-06-24
Payer: MEDICARE

## 2025-06-24 VITALS
BODY MASS INDEX: 31.87 KG/M2 | RESPIRATION RATE: 16 BRPM | HEIGHT: 62 IN | WEIGHT: 173.2 LBS | SYSTOLIC BLOOD PRESSURE: 148 MMHG | HEART RATE: 67 BPM | OXYGEN SATURATION: 97 % | TEMPERATURE: 97.2 F | DIASTOLIC BLOOD PRESSURE: 78 MMHG

## 2025-06-24 DIAGNOSIS — I50.9 CHRONIC CONGESTIVE HEART FAILURE, UNSPECIFIED HEART FAILURE TYPE (HCC): ICD-10-CM

## 2025-06-24 DIAGNOSIS — E03.9 ACQUIRED HYPOTHYROIDISM: ICD-10-CM

## 2025-06-24 DIAGNOSIS — E55.9 VITAMIN D DEFICIENCY: ICD-10-CM

## 2025-06-24 DIAGNOSIS — E11.9 TYPE 2 DIABETES MELLITUS WITHOUT COMPLICATION, WITHOUT LONG-TERM CURRENT USE OF INSULIN (HCC): ICD-10-CM

## 2025-06-24 DIAGNOSIS — I10 PRIMARY HYPERTENSION: Primary | ICD-10-CM

## 2025-06-24 PROCEDURE — 3078F DIAST BP <80 MM HG: CPT | Performed by: NURSE PRACTITIONER

## 2025-06-24 PROCEDURE — 3052F HG A1C>EQUAL 8.0%<EQUAL 9.0%: CPT | Performed by: NURSE PRACTITIONER

## 2025-06-24 PROCEDURE — G8427 DOCREV CUR MEDS BY ELIG CLIN: HCPCS | Performed by: NURSE PRACTITIONER

## 2025-06-24 PROCEDURE — G8417 CALC BMI ABV UP PARAM F/U: HCPCS | Performed by: NURSE PRACTITIONER

## 2025-06-24 PROCEDURE — G8399 PT W/DXA RESULTS DOCUMENT: HCPCS | Performed by: NURSE PRACTITIONER

## 2025-06-24 PROCEDURE — 1036F TOBACCO NON-USER: CPT | Performed by: NURSE PRACTITIONER

## 2025-06-24 PROCEDURE — 99214 OFFICE O/P EST MOD 30 MIN: CPT | Performed by: NURSE PRACTITIONER

## 2025-06-24 PROCEDURE — 1090F PRES/ABSN URINE INCON ASSESS: CPT | Performed by: NURSE PRACTITIONER

## 2025-06-24 PROCEDURE — 3077F SYST BP >= 140 MM HG: CPT | Performed by: NURSE PRACTITIONER

## 2025-06-24 PROCEDURE — 1123F ACP DISCUSS/DSCN MKR DOCD: CPT | Performed by: NURSE PRACTITIONER

## 2025-06-24 PROCEDURE — 1159F MED LIST DOCD IN RCRD: CPT | Performed by: NURSE PRACTITIONER

## 2025-06-24 RX ORDER — SACUBITRIL AND VALSARTAN 97; 103 MG/1; MG/1
1 TABLET, FILM COATED ORAL 2 TIMES DAILY
Qty: 180 TABLET | Refills: 2 | Status: SHIPPED | OUTPATIENT
Start: 2025-06-24

## 2025-06-24 NOTE — PROGRESS NOTES
HENT:      Head: Normocephalic and atraumatic.   Eyes:      Conjunctiva/sclera: Conjunctivae normal.      Pupils: Pupils are equal, round, and reactive to light.   Neck:      Thyroid: No thyromegaly.      Vascular: No JVD.   Cardiovascular:      Rate and Rhythm: Normal rate and regular rhythm.      Heart sounds: No murmur heard.     No friction rub. No gallop.   Pulmonary:      Effort: Pulmonary effort is normal. No respiratory distress.      Breath sounds: Normal breath sounds. No wheezing or rales.   Abdominal:      General: Bowel sounds are normal. There is no distension.      Palpations: Abdomen is soft.      Tenderness: There is no abdominal tenderness. There is no guarding.   Musculoskeletal:         General: No tenderness.      Cervical back: Normal range of motion and neck supple.   Skin:     General: Skin is warm and dry.      Findings: No rash.   Neurological:      Mental Status: She is alert and oriented to person, place, and time.   Psychiatric:         Judgment: Judgment normal.         No results found for requested labs within last 30 days.       Hemoglobin A1C (%)   Date Value   06/03/2025 8.3 (H)     LDL Direct (mg/dL)   Date Value   04/07/2015 82         Lab Results   Component Value Date/Time    WBC 8.0 06/03/2025 10:12 AM    NEUTROABS 5.0 08/25/2022 11:14 AM    HGB 12.4 06/03/2025 10:12 AM    HCT 39.8 06/03/2025 10:12 AM    MCV 91.9 06/03/2025 10:12 AM     06/03/2025 10:12 AM       Lab Results   Component Value Date    TSH 3.73 06/03/2025         ASSESSMENT/PLAN:   Assessment & Plan  1. Diabetes Mellitus.  - Advised to start Jardiance 10 mg, pending insurance approval.  - Medication will be initiated at a lower dose to assess effectiveness on heart, kidneys, and blood sugar levels.  - Blood work scheduled for 4 months from now to monitor condition.  - Discussed benefits of the medication for diabetes, heart, and kidneys.    2. Blood pressure management.  - Blood pressure was elevated

## 2025-06-24 NOTE — PATIENT INSTRUCTIONS
patches by folding them in half so that the sticky sides meet, and then flushing them down a toilet. They should not be placed in the household trash where children or pets can find them.  If you have any questions, ask your provider or pharmacist for more information.   Be sure to keep all appointments for provider visits or tests. We are committed to providing you with the best care possible.   In order to help us achieve these goals please remember to bring all medications, herbal products, and over the counter supplements with you to each visit.     If your provider has ordered testing for you, please be sure to follow up with our office if you have not received results within 7 days after the testing took place.     *If you receive a survey after visiting one of our offices, please take time to share your experience concerning your physician office visit. These surveys are confidential and no health information about you is shared.  We are eager to improve for you and we are counting on your feedback to help make that happen.    no

## (undated) DEVICE — SYR LUERLOK 5CC

## (undated) DEVICE — GLV SURG SENSICARE W/ALOE PF LF 8 STRL

## (undated) DEVICE — EYE PAK* 1033 NON-WOVEN OPHTHALMIC DRAPE APERTURE POUCHES: Brand: ALCON EYE-PAK

## (undated) DEVICE — CANN IRR/INJ AIR ANT CHAMBER 6MM BEND 27G

## (undated) DEVICE — DRSNG SURESITE123 2.4X2.8IN

## (undated) DEVICE — MEDI-VAC NON-CONDUCTIVE SUCTION TUBING: Brand: CARDINAL HEALTH

## (undated) DEVICE — ENDOSCOPY PORT CONNECTOR FOR OLYMPUS® SCOPES: Brand: ERBE

## (undated) DEVICE — LUBE JELLY PK/2.75GM STRL BX/144

## (undated) DEVICE — 15 DEG. MICROKNIFE - 3MM: Brand: SHARPOINT

## (undated) DEVICE — FRCP BIOP RADLJAW4 HOT 2.2X240 BX40

## (undated) DEVICE — HP CONCL INTREPID COAX I/A CRV .3MM

## (undated) DEVICE — SOL IRRIG H2O 1000ML STRL

## (undated) DEVICE — GLV SURG SENSICARE W/ALOE PF LF 7.5 STRL

## (undated) DEVICE — HYBRID TUBING/CAP SET FOR OLYMPUS® SCOPES: Brand: ERBE

## (undated) DEVICE — PAD GRND REM POLYHESIVE A/ DISP

## (undated) DEVICE — POST OP EYE CARE KIT: Brand: MEDLINE

## (undated) DEVICE — Device

## (undated) DEVICE — SYR LUER SLPTP 50ML

## (undated) DEVICE — CLEARCUT® HP2 SLIT KNIFE INTREPID MICRO-COAXIAL SYSTEM 2.4 DB: Brand: CLEARCUT®; INTREPID

## (undated) DEVICE — VLV SXN AIR/H2O ORCAPOD3 1P/U STRL